# Patient Record
Sex: MALE | Employment: OTHER | URBAN - NONMETROPOLITAN AREA
[De-identification: names, ages, dates, MRNs, and addresses within clinical notes are randomized per-mention and may not be internally consistent; named-entity substitution may affect disease eponyms.]

---

## 2017-04-29 ENCOUNTER — HOSPITAL ENCOUNTER (EMERGENCY)
Facility: HOSPITAL | Age: 82
Discharge: HOME | End: 2017-04-30
Attending: EMERGENCY MEDICINE | Admitting: EMERGENCY MEDICINE
Payer: MEDICARE

## 2017-04-29 ENCOUNTER — APPOINTMENT (EMERGENCY)
Dept: RADIOLOGY | Facility: HOSPITAL | Age: 82
End: 2017-04-29
Payer: MEDICARE

## 2017-04-29 DIAGNOSIS — R26.9 ABNORMALITY OF GAIT: Primary | ICD-10-CM

## 2017-04-29 DIAGNOSIS — G62.9 NEUROPATHY: ICD-10-CM

## 2017-04-29 LAB
ANION GAP SERPL CALCULATED.3IONS-SCNC: 10 MMOL/L (ref 4–13)
BASOPHILS # BLD AUTO: 0.03 THOUSANDS/ΜL (ref 0–0.1)
BASOPHILS NFR BLD AUTO: 1 % (ref 0–1)
BUN SERPL-MCNC: 30 MG/DL (ref 5–25)
CALCIUM SERPL-MCNC: 8.7 MG/DL (ref 8.3–10.1)
CHLORIDE SERPL-SCNC: 95 MMOL/L (ref 100–108)
CO2 SERPL-SCNC: 25 MMOL/L (ref 21–32)
CREAT SERPL-MCNC: 1.62 MG/DL (ref 0.6–1.3)
EOSINOPHIL # BLD AUTO: 0.17 THOUSAND/ΜL (ref 0–0.61)
EOSINOPHIL NFR BLD AUTO: 3 % (ref 0–6)
ERYTHROCYTE [DISTWIDTH] IN BLOOD BY AUTOMATED COUNT: 15 % (ref 11.6–15.1)
GFR SERPL CREATININE-BSD FRML MDRD: 41.2 ML/MIN/1.73SQ M
GLUCOSE SERPL-MCNC: 513 MG/DL (ref 65–140)
HCT VFR BLD AUTO: 38.5 % (ref 36.5–49.3)
HGB BLD-MCNC: 12.8 G/DL (ref 12–17)
INR PPP: 1.08 (ref 0.86–1.16)
LYMPHOCYTES # BLD AUTO: 1.27 THOUSANDS/ΜL (ref 0.6–4.47)
LYMPHOCYTES NFR BLD AUTO: 21 % (ref 14–44)
MCH RBC QN AUTO: 26.4 PG (ref 26.8–34.3)
MCHC RBC AUTO-ENTMCNC: 33.2 G/DL (ref 31.4–37.4)
MCV RBC AUTO: 80 FL (ref 82–98)
MONOCYTES # BLD AUTO: 0.48 THOUSAND/ΜL (ref 0.17–1.22)
MONOCYTES NFR BLD AUTO: 8 % (ref 4–12)
NEUTROPHILS # BLD AUTO: 4.21 THOUSANDS/ΜL (ref 1.85–7.62)
NEUTS SEG NFR BLD AUTO: 67 % (ref 43–75)
PLATELET # BLD AUTO: 118 THOUSANDS/UL (ref 149–390)
PMV BLD AUTO: 9.7 FL (ref 8.9–12.7)
POTASSIUM SERPL-SCNC: 5.6 MMOL/L (ref 3.5–5.3)
PROTHROMBIN TIME: 13.9 SECONDS (ref 12.1–14.4)
RBC # BLD AUTO: 4.84 MILLION/UL (ref 3.88–5.62)
SODIUM SERPL-SCNC: 130 MMOL/L (ref 136–145)
TROPONIN I SERPL-MCNC: <0.02 NG/ML
WBC # BLD AUTO: 6.16 THOUSAND/UL (ref 4.31–10.16)

## 2017-04-29 PROCEDURE — 85610 PROTHROMBIN TIME: CPT | Performed by: EMERGENCY MEDICINE

## 2017-04-29 PROCEDURE — 85025 COMPLETE CBC W/AUTO DIFF WBC: CPT | Performed by: EMERGENCY MEDICINE

## 2017-04-29 PROCEDURE — 71020 HB CHEST X-RAY 2VW FRONTAL&LATL: CPT

## 2017-04-29 PROCEDURE — 80048 BASIC METABOLIC PNL TOTAL CA: CPT | Performed by: EMERGENCY MEDICINE

## 2017-04-29 PROCEDURE — 87086 URINE CULTURE/COLONY COUNT: CPT | Performed by: EMERGENCY MEDICINE

## 2017-04-29 PROCEDURE — 81001 URINALYSIS AUTO W/SCOPE: CPT | Performed by: EMERGENCY MEDICINE

## 2017-04-29 PROCEDURE — 36415 COLL VENOUS BLD VENIPUNCTURE: CPT | Performed by: EMERGENCY MEDICINE

## 2017-04-29 PROCEDURE — 84484 ASSAY OF TROPONIN QUANT: CPT | Performed by: EMERGENCY MEDICINE

## 2017-04-29 PROCEDURE — 93005 ELECTROCARDIOGRAM TRACING: CPT | Performed by: EMERGENCY MEDICINE

## 2017-04-30 VITALS
HEIGHT: 69 IN | BODY MASS INDEX: 29.81 KG/M2 | HEART RATE: 82 BPM | SYSTOLIC BLOOD PRESSURE: 135 MMHG | DIASTOLIC BLOOD PRESSURE: 73 MMHG | RESPIRATION RATE: 18 BRPM | OXYGEN SATURATION: 98 % | TEMPERATURE: 98.2 F | WEIGHT: 201.28 LBS

## 2017-04-30 LAB
ANION GAP BLD CALC-SCNC: 18 MMOL/L (ref 4–13)
BACTERIA UR QL AUTO: NORMAL /HPF
BILIRUB UR QL STRIP: NEGATIVE
BUN BLD-MCNC: 35 MG/DL (ref 5–25)
CA-I BLD-SCNC: 1.2 MMOL/L (ref 1.12–1.32)
CHLORIDE BLD-SCNC: 95 MMOL/L (ref 100–108)
CLARITY UR: CLEAR
COLOR UR: YELLOW
CREAT BLD-MCNC: 1.2 MG/DL (ref 0.6–1.3)
GFR SERPL CREATININE-BSD FRML MDRD: 58.3 ML/MIN/1.73SQ M
GLUCOSE SERPL-MCNC: 481 MG/DL (ref 65–140)
GLUCOSE UR STRIP-MCNC: ABNORMAL MG/DL
HCT VFR BLD CALC: 37 % (ref 36.5–49.3)
HGB BLDA-MCNC: 12.6 G/DL (ref 12–17)
HGB UR QL STRIP.AUTO: ABNORMAL
KETONES UR STRIP-MCNC: NEGATIVE MG/DL
LEUKOCYTE ESTERASE UR QL STRIP: ABNORMAL
NITRITE UR QL STRIP: NEGATIVE
NON-SQ EPI CELLS URNS QL MICRO: NORMAL /HPF
PCO2 BLD: 24 MMOL/L (ref 21–32)
PH UR STRIP.AUTO: 5 [PH] (ref 4.5–8)
POTASSIUM BLD-SCNC: 4.9 MMOL/L (ref 3.5–5.3)
PROT UR STRIP-MCNC: NEGATIVE MG/DL
RBC #/AREA URNS AUTO: NORMAL /HPF
SODIUM BLD-SCNC: 132 MMOL/L (ref 136–145)
SP GR UR STRIP.AUTO: 1.01 (ref 1–1.03)
SPECIMEN SOURCE: ABNORMAL
UROBILINOGEN UR QL STRIP.AUTO: 0.2 E.U./DL
WBC #/AREA URNS AUTO: NORMAL /HPF

## 2017-04-30 PROCEDURE — 99284 EMERGENCY DEPT VISIT MOD MDM: CPT

## 2017-04-30 PROCEDURE — 85014 HEMATOCRIT: CPT

## 2017-04-30 PROCEDURE — 80047 BASIC METABLC PNL IONIZED CA: CPT

## 2017-05-01 LAB — BACTERIA UR CULT: NORMAL

## 2017-05-02 LAB
ATRIAL RATE: 86 BPM
P AXIS: 19 DEGREES
PR INTERVAL: 176 MS
QRS AXIS: 13 DEGREES
QRSD INTERVAL: 82 MS
QT INTERVAL: 368 MS
QTC INTERVAL: 440 MS
T WAVE AXIS: 53 DEGREES
VENTRICULAR RATE: 86 BPM

## 2017-06-11 ENCOUNTER — HOSPITAL ENCOUNTER (INPATIENT)
Facility: HOSPITAL | Age: 82
LOS: 5 days | Discharge: NURSING, REHAB OR LONG-TERM CARE | DRG: 638 | End: 2017-06-16
Attending: EMERGENCY MEDICINE | Admitting: FAMILY MEDICINE
Payer: MEDICARE

## 2017-06-11 ENCOUNTER — APPOINTMENT (EMERGENCY)
Dept: RADIOLOGY | Facility: HOSPITAL | Age: 82
DRG: 638 | End: 2017-06-11
Payer: MEDICARE

## 2017-06-11 ENCOUNTER — APPOINTMENT (EMERGENCY)
Dept: CT IMAGING | Facility: HOSPITAL | Age: 82
DRG: 638 | End: 2017-06-11
Payer: MEDICARE

## 2017-06-11 DIAGNOSIS — E11.00 UNCONTROLLED TYPE 2 DM WITH HYPEROSMOLAR NONKETOTIC HYPERGLYCEMIA (HCC): Primary | ICD-10-CM

## 2017-06-11 DIAGNOSIS — F03.90 DEMENTIA (HCC): ICD-10-CM

## 2017-06-11 LAB
ACETONE SERPL-MCNC: NEGATIVE MG/DL
ALBUMIN SERPL BCP-MCNC: 3.2 G/DL (ref 3.5–5)
ALP SERPL-CCNC: 78 U/L (ref 46–116)
ALT SERPL W P-5'-P-CCNC: 27 U/L (ref 12–78)
ANION GAP SERPL CALCULATED.3IONS-SCNC: 8 MMOL/L (ref 4–13)
AST SERPL W P-5'-P-CCNC: 20 U/L (ref 5–45)
BASOPHILS # BLD AUTO: 0.02 THOUSANDS/ΜL (ref 0–0.1)
BASOPHILS NFR BLD AUTO: 0 % (ref 0–1)
BILIRUB SERPL-MCNC: 0.4 MG/DL (ref 0.2–1)
BUN SERPL-MCNC: 26 MG/DL (ref 5–25)
CALCIUM SERPL-MCNC: 8.9 MG/DL (ref 8.3–10.1)
CHLORIDE SERPL-SCNC: 89 MMOL/L (ref 100–108)
CK SERPL-CCNC: 36 U/L (ref 39–308)
CO2 SERPL-SCNC: 27 MMOL/L (ref 21–32)
CREAT SERPL-MCNC: 1.79 MG/DL (ref 0.6–1.3)
EOSINOPHIL # BLD AUTO: 0.12 THOUSAND/ΜL (ref 0–0.61)
EOSINOPHIL NFR BLD AUTO: 2 % (ref 0–6)
ERYTHROCYTE [DISTWIDTH] IN BLOOD BY AUTOMATED COUNT: 14.7 % (ref 11.6–15.1)
ETHANOL SERPL-MCNC: <3 MG/DL (ref 0–3)
GFR SERPL CREATININE-BSD FRML MDRD: 36.7 ML/MIN/1.73SQ M
GLUCOSE SERPL-MCNC: 774 MG/DL (ref 65–140)
GLUCOSE SERPL-MCNC: >500 MG/DL (ref 65–140)
GLUCOSE SERPL-MCNC: >500 MG/DL (ref 65–140)
HCT VFR BLD AUTO: 39.1 % (ref 36.5–49.3)
HGB BLD-MCNC: 12.9 G/DL (ref 12–17)
LACTATE SERPL-SCNC: 3.2 MMOL/L (ref 0.5–2)
LIPASE SERPL-CCNC: 155 U/L (ref 73–393)
LYMPHOCYTES # BLD AUTO: 1.27 THOUSANDS/ΜL (ref 0.6–4.47)
LYMPHOCYTES NFR BLD AUTO: 22 % (ref 14–44)
MAGNESIUM SERPL-MCNC: 1.9 MG/DL (ref 1.6–2.6)
MCH RBC QN AUTO: 26.3 PG (ref 26.8–34.3)
MCHC RBC AUTO-ENTMCNC: 33 G/DL (ref 31.4–37.4)
MCV RBC AUTO: 80 FL (ref 82–98)
MONOCYTES # BLD AUTO: 0.46 THOUSAND/ΜL (ref 0.17–1.22)
MONOCYTES NFR BLD AUTO: 8 % (ref 4–12)
NEUTROPHILS # BLD AUTO: 4.03 THOUSANDS/ΜL (ref 1.85–7.62)
NEUTS SEG NFR BLD AUTO: 68 % (ref 43–75)
PLATELET # BLD AUTO: 111 THOUSANDS/UL (ref 149–390)
PMV BLD AUTO: 9.7 FL (ref 8.9–12.7)
POTASSIUM SERPL-SCNC: 4.6 MMOL/L (ref 3.5–5.3)
PROT SERPL-MCNC: 7.9 G/DL (ref 6.4–8.2)
RBC # BLD AUTO: 4.91 MILLION/UL (ref 3.88–5.62)
SODIUM SERPL-SCNC: 124 MMOL/L (ref 136–145)
TROPONIN I SERPL-MCNC: <0.02 NG/ML
TSH SERPL DL<=0.05 MIU/L-ACNC: 2.85 UIU/ML (ref 0.36–3.74)
WBC # BLD AUTO: 5.9 THOUSAND/UL (ref 4.31–10.16)

## 2017-06-11 PROCEDURE — 83690 ASSAY OF LIPASE: CPT | Performed by: EMERGENCY MEDICINE

## 2017-06-11 PROCEDURE — 71010 HB CHEST X-RAY 1 VIEW FRONTAL (PORTABLE): CPT

## 2017-06-11 PROCEDURE — 80320 DRUG SCREEN QUANTALCOHOLS: CPT | Performed by: EMERGENCY MEDICINE

## 2017-06-11 PROCEDURE — 82009 KETONE BODYS QUAL: CPT | Performed by: EMERGENCY MEDICINE

## 2017-06-11 PROCEDURE — 96374 THER/PROPH/DIAG INJ IV PUSH: CPT

## 2017-06-11 PROCEDURE — 85025 COMPLETE CBC W/AUTO DIFF WBC: CPT | Performed by: EMERGENCY MEDICINE

## 2017-06-11 PROCEDURE — 36415 COLL VENOUS BLD VENIPUNCTURE: CPT | Performed by: EMERGENCY MEDICINE

## 2017-06-11 PROCEDURE — 93005 ELECTROCARDIOGRAM TRACING: CPT | Performed by: EMERGENCY MEDICINE

## 2017-06-11 PROCEDURE — 70450 CT HEAD/BRAIN W/O DYE: CPT

## 2017-06-11 PROCEDURE — 82550 ASSAY OF CK (CPK): CPT | Performed by: EMERGENCY MEDICINE

## 2017-06-11 PROCEDURE — 87147 CULTURE TYPE IMMUNOLOGIC: CPT | Performed by: EMERGENCY MEDICINE

## 2017-06-11 PROCEDURE — 84484 ASSAY OF TROPONIN QUANT: CPT | Performed by: EMERGENCY MEDICINE

## 2017-06-11 PROCEDURE — 84443 ASSAY THYROID STIM HORMONE: CPT | Performed by: EMERGENCY MEDICINE

## 2017-06-11 PROCEDURE — 82948 REAGENT STRIP/BLOOD GLUCOSE: CPT

## 2017-06-11 PROCEDURE — 80053 COMPREHEN METABOLIC PANEL: CPT | Performed by: EMERGENCY MEDICINE

## 2017-06-11 PROCEDURE — 83605 ASSAY OF LACTIC ACID: CPT | Performed by: EMERGENCY MEDICINE

## 2017-06-11 PROCEDURE — 83735 ASSAY OF MAGNESIUM: CPT | Performed by: EMERGENCY MEDICINE

## 2017-06-11 PROCEDURE — 96361 HYDRATE IV INFUSION ADD-ON: CPT

## 2017-06-11 PROCEDURE — 87040 BLOOD CULTURE FOR BACTERIA: CPT | Performed by: EMERGENCY MEDICINE

## 2017-06-11 RX ORDER — SODIUM CHLORIDE 9 MG/ML
500 INJECTION, SOLUTION INTRAVENOUS CONTINUOUS
Status: DISCONTINUED | OUTPATIENT
Start: 2017-06-12 | End: 2017-06-13

## 2017-06-11 RX ORDER — SODIUM CHLORIDE 9 MG/ML
2000 INJECTION, SOLUTION INTRAVENOUS CONTINUOUS
Status: ACTIVE | OUTPATIENT
Start: 2017-06-11 | End: 2017-06-12

## 2017-06-11 RX ORDER — DEXTROSE AND SODIUM CHLORIDE 5; .9 G/100ML; G/100ML
250 INJECTION, SOLUTION INTRAVENOUS CONTINUOUS
Status: DISCONTINUED | OUTPATIENT
Start: 2017-06-11 | End: 2017-06-12

## 2017-06-11 RX ORDER — SODIUM CHLORIDE 9 MG/ML
250 INJECTION, SOLUTION INTRAVENOUS CONTINUOUS
Status: DISCONTINUED | OUTPATIENT
Start: 2017-06-12 | End: 2017-06-13

## 2017-06-11 RX ADMIN — SODIUM CHLORIDE 2000 ML/HR: 0.9 INJECTION, SOLUTION INTRAVENOUS at 23:40

## 2017-06-11 RX ADMIN — SODIUM CHLORIDE 9 UNITS/HR: 9 INJECTION, SOLUTION INTRAVENOUS at 23:40

## 2017-06-11 RX ADMIN — SODIUM CHLORIDE 2634 ML: 0.9 INJECTION, SOLUTION INTRAVENOUS at 22:17

## 2017-06-12 ENCOUNTER — APPOINTMENT (INPATIENT)
Dept: OCCUPATIONAL THERAPY | Facility: HOSPITAL | Age: 82
DRG: 638 | End: 2017-06-12
Payer: MEDICARE

## 2017-06-12 ENCOUNTER — APPOINTMENT (INPATIENT)
Dept: PHYSICAL THERAPY | Facility: HOSPITAL | Age: 82
DRG: 638 | End: 2017-06-12
Payer: MEDICARE

## 2017-06-12 PROBLEM — E11.65 HYPERGLYCEMIA DUE TO TYPE 2 DIABETES MELLITUS (HCC): Status: ACTIVE | Noted: 2017-06-12

## 2017-06-12 PROBLEM — F03.90 DEMENTIA (HCC): Status: ACTIVE | Noted: 2017-06-12

## 2017-06-12 PROBLEM — R26.2 AMBULATORY DYSFUNCTION: Status: ACTIVE | Noted: 2017-06-12

## 2017-06-12 LAB
ANION GAP SERPL CALCULATED.3IONS-SCNC: 4 MMOL/L (ref 4–13)
ATRIAL RATE: 72 BPM
BUN SERPL-MCNC: 20 MG/DL (ref 5–25)
CALCIUM SERPL-MCNC: 7.8 MG/DL (ref 8.3–10.1)
CHLORIDE SERPL-SCNC: 108 MMOL/L (ref 100–108)
CO2 SERPL-SCNC: 26 MMOL/L (ref 21–32)
CREAT SERPL-MCNC: 1.1 MG/DL (ref 0.6–1.3)
ERYTHROCYTE [DISTWIDTH] IN BLOOD BY AUTOMATED COUNT: 14.2 % (ref 11.6–15.1)
EST. AVERAGE GLUCOSE BLD GHB EST-MCNC: 346 MG/DL
GFR SERPL CREATININE-BSD FRML MDRD: >60 ML/MIN/1.73SQ M
GLUCOSE SERPL-MCNC: 107 MG/DL (ref 65–140)
GLUCOSE SERPL-MCNC: 117 MG/DL (ref 65–140)
GLUCOSE SERPL-MCNC: 175 MG/DL (ref 65–140)
GLUCOSE SERPL-MCNC: 218 MG/DL (ref 65–140)
GLUCOSE SERPL-MCNC: 219 MG/DL (ref 65–140)
GLUCOSE SERPL-MCNC: 309 MG/DL (ref 65–140)
GLUCOSE SERPL-MCNC: 335 MG/DL (ref 65–140)
GLUCOSE SERPL-MCNC: 376 MG/DL (ref 65–140)
GLUCOSE SERPL-MCNC: 410 MG/DL (ref 65–140)
GLUCOSE SERPL-MCNC: 423 MG/DL (ref 65–140)
GLUCOSE SERPL-MCNC: 436 MG/DL (ref 65–140)
GLUCOSE SERPL-MCNC: >500 MG/DL (ref 65–140)
HBA1C MFR BLD: 13.7 % (ref 4.2–6.3)
HCT VFR BLD AUTO: 32.3 % (ref 36.5–49.3)
HGB BLD-MCNC: 10.4 G/DL (ref 12–17)
LACTATE SERPL-SCNC: 1.5 MMOL/L (ref 0.5–2)
MAGNESIUM SERPL-MCNC: 1.8 MG/DL (ref 1.6–2.6)
MCH RBC QN AUTO: 25.8 PG (ref 26.8–34.3)
MCHC RBC AUTO-ENTMCNC: 32.2 G/DL (ref 31.4–37.4)
MCV RBC AUTO: 80 FL (ref 82–98)
P AXIS: 27 DEGREES
PLATELET # BLD AUTO: 80 THOUSANDS/UL (ref 149–390)
PMV BLD AUTO: 9.3 FL (ref 8.9–12.7)
POTASSIUM SERPL-SCNC: 3.6 MMOL/L (ref 3.5–5.3)
PR INTERVAL: 202 MS
QRS AXIS: 10 DEGREES
QRSD INTERVAL: 78 MS
QT INTERVAL: 410 MS
QTC INTERVAL: 448 MS
RBC # BLD AUTO: 4.03 MILLION/UL (ref 3.88–5.62)
SODIUM SERPL-SCNC: 138 MMOL/L (ref 136–145)
T WAVE AXIS: 63 DEGREES
VENTRICULAR RATE: 72 BPM
WBC # BLD AUTO: 4.39 THOUSAND/UL (ref 4.31–10.16)

## 2017-06-12 PROCEDURE — G8987 SELF CARE CURRENT STATUS: HCPCS

## 2017-06-12 PROCEDURE — 82948 REAGENT STRIP/BLOOD GLUCOSE: CPT

## 2017-06-12 PROCEDURE — 97167 OT EVAL HIGH COMPLEX 60 MIN: CPT

## 2017-06-12 PROCEDURE — 83036 HEMOGLOBIN GLYCOSYLATED A1C: CPT | Performed by: PHYSICIAN ASSISTANT

## 2017-06-12 PROCEDURE — G8988 SELF CARE GOAL STATUS: HCPCS

## 2017-06-12 PROCEDURE — 99285 EMERGENCY DEPT VISIT HI MDM: CPT

## 2017-06-12 PROCEDURE — 97162 PT EVAL MOD COMPLEX 30 MIN: CPT | Performed by: PHYSICAL THERAPIST

## 2017-06-12 PROCEDURE — 97535 SELF CARE MNGMENT TRAINING: CPT

## 2017-06-12 PROCEDURE — 83735 ASSAY OF MAGNESIUM: CPT | Performed by: PHYSICIAN ASSISTANT

## 2017-06-12 PROCEDURE — 80048 BASIC METABOLIC PNL TOTAL CA: CPT | Performed by: PHYSICIAN ASSISTANT

## 2017-06-12 PROCEDURE — G8978 MOBILITY CURRENT STATUS: HCPCS | Performed by: PHYSICAL THERAPIST

## 2017-06-12 PROCEDURE — 83605 ASSAY OF LACTIC ACID: CPT | Performed by: PHYSICIAN ASSISTANT

## 2017-06-12 PROCEDURE — G8979 MOBILITY GOAL STATUS: HCPCS | Performed by: PHYSICAL THERAPIST

## 2017-06-12 PROCEDURE — 85027 COMPLETE CBC AUTOMATED: CPT | Performed by: PHYSICIAN ASSISTANT

## 2017-06-12 PROCEDURE — 97116 GAIT TRAINING THERAPY: CPT | Performed by: PHYSICAL THERAPIST

## 2017-06-12 RX ORDER — HEPARIN SODIUM 5000 [USP'U]/ML
5000 INJECTION, SOLUTION INTRAVENOUS; SUBCUTANEOUS EVERY 8 HOURS SCHEDULED
Status: DISCONTINUED | OUTPATIENT
Start: 2017-06-12 | End: 2017-06-16 | Stop reason: HOSPADM

## 2017-06-12 RX ORDER — ONDANSETRON 2 MG/ML
4 INJECTION INTRAMUSCULAR; INTRAVENOUS EVERY 6 HOURS PRN
Status: DISCONTINUED | OUTPATIENT
Start: 2017-06-12 | End: 2017-06-16 | Stop reason: HOSPADM

## 2017-06-12 RX ORDER — MAGNESIUM HYDROXIDE/ALUMINUM HYDROXICE/SIMETHICONE 120; 1200; 1200 MG/30ML; MG/30ML; MG/30ML
30 SUSPENSION ORAL EVERY 6 HOURS PRN
Status: DISCONTINUED | OUTPATIENT
Start: 2017-06-12 | End: 2017-06-16 | Stop reason: HOSPADM

## 2017-06-12 RX ORDER — SODIUM CHLORIDE 9 MG/ML
125 INJECTION, SOLUTION INTRAVENOUS CONTINUOUS
Status: DISCONTINUED | OUTPATIENT
Start: 2017-06-12 | End: 2017-06-14

## 2017-06-12 RX ORDER — ACETAMINOPHEN 325 MG/1
650 TABLET ORAL EVERY 6 HOURS PRN
Status: DISCONTINUED | OUTPATIENT
Start: 2017-06-12 | End: 2017-06-14

## 2017-06-12 RX ADMIN — INSULIN LISPRO 6 UNITS: 100 INJECTION, SOLUTION INTRAVENOUS; SUBCUTANEOUS at 15:38

## 2017-06-12 RX ADMIN — SODIUM CHLORIDE 10 UNITS/HR: 9 INJECTION, SOLUTION INTRAVENOUS at 00:46

## 2017-06-12 RX ADMIN — SODIUM CHLORIDE 125 ML/HR: 0.9 INJECTION, SOLUTION INTRAVENOUS at 01:00

## 2017-06-12 RX ADMIN — HEPARIN SODIUM 5000 UNITS: 5000 INJECTION, SOLUTION INTRAVENOUS; SUBCUTANEOUS at 10:50

## 2017-06-12 RX ADMIN — INSULIN LISPRO 6 UNITS: 100 INJECTION, SOLUTION INTRAVENOUS; SUBCUTANEOUS at 12:59

## 2017-06-12 RX ADMIN — SITAGLIPTIN 100 MG: 100 TABLET, FILM COATED ORAL at 13:01

## 2017-06-12 RX ADMIN — SODIUM CHLORIDE 500 ML/HR: 0.9 INJECTION, SOLUTION INTRAVENOUS at 00:49

## 2017-06-12 RX ADMIN — SODIUM CHLORIDE 125 ML/HR: 0.9 INJECTION, SOLUTION INTRAVENOUS at 08:48

## 2017-06-12 RX ADMIN — HEPARIN SODIUM 5000 UNITS: 5000 INJECTION, SOLUTION INTRAVENOUS; SUBCUTANEOUS at 18:05

## 2017-06-12 RX ADMIN — SODIUM CHLORIDE 125 ML/HR: 0.9 INJECTION, SOLUTION INTRAVENOUS at 16:52

## 2017-06-12 RX ADMIN — HEPARIN SODIUM 5000 UNITS: 5000 INJECTION, SOLUTION INTRAVENOUS; SUBCUTANEOUS at 02:06

## 2017-06-12 RX ADMIN — INSULIN LISPRO 3 UNITS: 100 INJECTION, SOLUTION INTRAVENOUS; SUBCUTANEOUS at 21:09

## 2017-06-12 RX ADMIN — INSULIN LISPRO 4 UNITS: 100 INJECTION, SOLUTION INTRAVENOUS; SUBCUTANEOUS at 15:43

## 2017-06-12 RX ADMIN — INSULIN LISPRO 10 UNITS: 100 INJECTION, SOLUTION INTRAVENOUS; SUBCUTANEOUS at 10:48

## 2017-06-13 ENCOUNTER — APPOINTMENT (INPATIENT)
Dept: PHYSICAL THERAPY | Facility: HOSPITAL | Age: 82
DRG: 638 | End: 2017-06-13
Payer: MEDICARE

## 2017-06-13 ENCOUNTER — APPOINTMENT (INPATIENT)
Dept: OCCUPATIONAL THERAPY | Facility: HOSPITAL | Age: 82
DRG: 638 | End: 2017-06-13
Payer: MEDICARE

## 2017-06-13 PROBLEM — E87.1 HYPONATREMIA: Status: ACTIVE | Noted: 2017-06-13

## 2017-06-13 LAB
ANION GAP SERPL CALCULATED.3IONS-SCNC: 9 MMOL/L (ref 4–13)
BASOPHILS # BLD AUTO: 0.01 THOUSANDS/ΜL (ref 0–0.1)
BASOPHILS NFR BLD AUTO: 0 % (ref 0–1)
BUN SERPL-MCNC: 14 MG/DL (ref 5–25)
CALCIUM SERPL-MCNC: 7.8 MG/DL (ref 8.3–10.1)
CHLORIDE SERPL-SCNC: 106 MMOL/L (ref 100–108)
CO2 SERPL-SCNC: 21 MMOL/L (ref 21–32)
CREAT SERPL-MCNC: 1.1 MG/DL (ref 0.6–1.3)
EOSINOPHIL # BLD AUTO: 0.07 THOUSAND/ΜL (ref 0–0.61)
EOSINOPHIL NFR BLD AUTO: 2 % (ref 0–6)
ERYTHROCYTE [DISTWIDTH] IN BLOOD BY AUTOMATED COUNT: 14.5 % (ref 11.6–15.1)
GFR SERPL CREATININE-BSD FRML MDRD: >60 ML/MIN/1.73SQ M
GLUCOSE SERPL-MCNC: 269 MG/DL (ref 65–140)
GLUCOSE SERPL-MCNC: 306 MG/DL (ref 65–140)
GLUCOSE SERPL-MCNC: 325 MG/DL (ref 65–140)
GLUCOSE SERPL-MCNC: 357 MG/DL (ref 65–140)
GLUCOSE SERPL-MCNC: 372 MG/DL (ref 65–140)
HCT VFR BLD AUTO: 34.5 % (ref 36.5–49.3)
HGB BLD-MCNC: 10.9 G/DL (ref 12–17)
LYMPHOCYTES # BLD AUTO: 1.04 THOUSANDS/ΜL (ref 0.6–4.47)
LYMPHOCYTES NFR BLD AUTO: 29 % (ref 14–44)
MCH RBC QN AUTO: 25.4 PG (ref 26.8–34.3)
MCHC RBC AUTO-ENTMCNC: 31.6 G/DL (ref 31.4–37.4)
MCV RBC AUTO: 80 FL (ref 82–98)
MONOCYTES # BLD AUTO: 0.29 THOUSAND/ΜL (ref 0.17–1.22)
MONOCYTES NFR BLD AUTO: 8 % (ref 4–12)
NEUTROPHILS # BLD AUTO: 2.15 THOUSANDS/ΜL (ref 1.85–7.62)
NEUTS SEG NFR BLD AUTO: 61 % (ref 43–75)
PLATELET # BLD AUTO: 84 THOUSANDS/UL (ref 149–390)
PMV BLD AUTO: 9.5 FL (ref 8.9–12.7)
POTASSIUM SERPL-SCNC: 4.3 MMOL/L (ref 3.5–5.3)
RBC # BLD AUTO: 4.29 MILLION/UL (ref 3.88–5.62)
SODIUM SERPL-SCNC: 136 MMOL/L (ref 136–145)
WBC # BLD AUTO: 3.56 THOUSAND/UL (ref 4.31–10.16)

## 2017-06-13 PROCEDURE — 82948 REAGENT STRIP/BLOOD GLUCOSE: CPT

## 2017-06-13 PROCEDURE — 80048 BASIC METABOLIC PNL TOTAL CA: CPT | Performed by: PHYSICIAN ASSISTANT

## 2017-06-13 PROCEDURE — 97110 THERAPEUTIC EXERCISES: CPT

## 2017-06-13 PROCEDURE — 97116 GAIT TRAINING THERAPY: CPT

## 2017-06-13 PROCEDURE — 85025 COMPLETE CBC W/AUTO DIFF WBC: CPT | Performed by: PHYSICIAN ASSISTANT

## 2017-06-13 RX ORDER — LISINOPRIL 5 MG/1
5 TABLET ORAL DAILY
Status: DISCONTINUED | OUTPATIENT
Start: 2017-06-13 | End: 2017-06-16 | Stop reason: HOSPADM

## 2017-06-13 RX ORDER — INSULIN GLARGINE 100 [IU]/ML
15 INJECTION, SOLUTION SUBCUTANEOUS EVERY MORNING
Status: DISCONTINUED | OUTPATIENT
Start: 2017-06-13 | End: 2017-06-14

## 2017-06-13 RX ADMIN — INSULIN LISPRO 3 UNITS: 100 INJECTION, SOLUTION INTRAVENOUS; SUBCUTANEOUS at 21:33

## 2017-06-13 RX ADMIN — INSULIN LISPRO 6 UNITS: 100 INJECTION, SOLUTION INTRAVENOUS; SUBCUTANEOUS at 12:49

## 2017-06-13 RX ADMIN — HEPARIN SODIUM 5000 UNITS: 5000 INJECTION, SOLUTION INTRAVENOUS; SUBCUTANEOUS at 17:51

## 2017-06-13 RX ADMIN — HEPARIN SODIUM 5000 UNITS: 5000 INJECTION, SOLUTION INTRAVENOUS; SUBCUTANEOUS at 01:48

## 2017-06-13 RX ADMIN — INSULIN GLARGINE 15 UNITS: 100 INJECTION, SOLUTION SUBCUTANEOUS at 09:57

## 2017-06-13 RX ADMIN — SITAGLIPTIN 100 MG: 100 TABLET, FILM COATED ORAL at 09:57

## 2017-06-13 RX ADMIN — SODIUM CHLORIDE 125 ML/HR: 0.9 INJECTION, SOLUTION INTRAVENOUS at 01:45

## 2017-06-13 RX ADMIN — INSULIN LISPRO 5 UNITS: 100 INJECTION, SOLUTION INTRAVENOUS; SUBCUTANEOUS at 17:52

## 2017-06-13 RX ADMIN — INSULIN LISPRO 4 UNITS: 100 INJECTION, SOLUTION INTRAVENOUS; SUBCUTANEOUS at 09:56

## 2017-06-13 RX ADMIN — SODIUM CHLORIDE 125 ML/HR: 0.9 INJECTION, SOLUTION INTRAVENOUS at 18:50

## 2017-06-13 RX ADMIN — LISINOPRIL 5 MG: 5 TABLET ORAL at 12:49

## 2017-06-13 RX ADMIN — HEPARIN SODIUM 5000 UNITS: 5000 INJECTION, SOLUTION INTRAVENOUS; SUBCUTANEOUS at 09:55

## 2017-06-13 RX ADMIN — SODIUM CHLORIDE 125 ML/HR: 0.9 INJECTION, SOLUTION INTRAVENOUS at 10:36

## 2017-06-14 ENCOUNTER — APPOINTMENT (INPATIENT)
Dept: OCCUPATIONAL THERAPY | Facility: HOSPITAL | Age: 82
DRG: 638 | End: 2017-06-14
Payer: MEDICARE

## 2017-06-14 ENCOUNTER — APPOINTMENT (INPATIENT)
Dept: PHYSICAL THERAPY | Facility: HOSPITAL | Age: 82
DRG: 638 | End: 2017-06-14
Payer: MEDICARE

## 2017-06-14 LAB
ANION GAP SERPL CALCULATED.3IONS-SCNC: 9 MMOL/L (ref 4–13)
BASOPHILS # BLD AUTO: 0.01 THOUSANDS/ΜL (ref 0–0.1)
BASOPHILS NFR BLD AUTO: 0 % (ref 0–1)
BUN SERPL-MCNC: 11 MG/DL (ref 5–25)
CALCIUM SERPL-MCNC: 7.8 MG/DL (ref 8.3–10.1)
CHLORIDE SERPL-SCNC: 106 MMOL/L (ref 100–108)
CO2 SERPL-SCNC: 22 MMOL/L (ref 21–32)
CREAT SERPL-MCNC: 1.06 MG/DL (ref 0.6–1.3)
EOSINOPHIL # BLD AUTO: 0.1 THOUSAND/ΜL (ref 0–0.61)
EOSINOPHIL NFR BLD AUTO: 3 % (ref 0–6)
ERYTHROCYTE [DISTWIDTH] IN BLOOD BY AUTOMATED COUNT: 14.6 % (ref 11.6–15.1)
GFR SERPL CREATININE-BSD FRML MDRD: >60 ML/MIN/1.73SQ M
GLUCOSE SERPL-MCNC: 241 MG/DL (ref 65–140)
GLUCOSE SERPL-MCNC: 322 MG/DL (ref 65–140)
GLUCOSE SERPL-MCNC: 333 MG/DL (ref 65–140)
GLUCOSE SERPL-MCNC: 344 MG/DL (ref 65–140)
GLUCOSE SERPL-MCNC: 368 MG/DL (ref 65–140)
HCT VFR BLD AUTO: 33.3 % (ref 36.5–49.3)
HGB BLD-MCNC: 10.7 G/DL (ref 12–17)
LYMPHOCYTES # BLD AUTO: 0.95 THOUSANDS/ΜL (ref 0.6–4.47)
LYMPHOCYTES NFR BLD AUTO: 26 % (ref 14–44)
MAGNESIUM SERPL-MCNC: 1.7 MG/DL (ref 1.6–2.6)
MCH RBC QN AUTO: 25.8 PG (ref 26.8–34.3)
MCHC RBC AUTO-ENTMCNC: 32.1 G/DL (ref 31.4–37.4)
MCV RBC AUTO: 80 FL (ref 82–98)
MONOCYTES # BLD AUTO: 0.31 THOUSAND/ΜL (ref 0.17–1.22)
MONOCYTES NFR BLD AUTO: 9 % (ref 4–12)
NEUTROPHILS # BLD AUTO: 2.28 THOUSANDS/ΜL (ref 1.85–7.62)
NEUTS SEG NFR BLD AUTO: 62 % (ref 43–75)
PHOSPHATE SERPL-MCNC: 2.6 MG/DL (ref 2.3–4.1)
PLATELET # BLD AUTO: 84 THOUSANDS/UL (ref 149–390)
PMV BLD AUTO: 9.5 FL (ref 8.9–12.7)
POTASSIUM SERPL-SCNC: 4.2 MMOL/L (ref 3.5–5.3)
RBC # BLD AUTO: 4.14 MILLION/UL (ref 3.88–5.62)
SODIUM SERPL-SCNC: 137 MMOL/L (ref 136–145)
WBC # BLD AUTO: 3.65 THOUSAND/UL (ref 4.31–10.16)

## 2017-06-14 PROCEDURE — 83735 ASSAY OF MAGNESIUM: CPT | Performed by: PHYSICIAN ASSISTANT

## 2017-06-14 PROCEDURE — 80048 BASIC METABOLIC PNL TOTAL CA: CPT | Performed by: PHYSICIAN ASSISTANT

## 2017-06-14 PROCEDURE — 97110 THERAPEUTIC EXERCISES: CPT

## 2017-06-14 PROCEDURE — 82948 REAGENT STRIP/BLOOD GLUCOSE: CPT

## 2017-06-14 PROCEDURE — 85025 COMPLETE CBC W/AUTO DIFF WBC: CPT | Performed by: PHYSICIAN ASSISTANT

## 2017-06-14 PROCEDURE — 97530 THERAPEUTIC ACTIVITIES: CPT

## 2017-06-14 PROCEDURE — 97535 SELF CARE MNGMENT TRAINING: CPT

## 2017-06-14 PROCEDURE — 97116 GAIT TRAINING THERAPY: CPT

## 2017-06-14 PROCEDURE — 84100 ASSAY OF PHOSPHORUS: CPT | Performed by: PHYSICIAN ASSISTANT

## 2017-06-14 RX ORDER — ACETAMINOPHEN 325 MG/1
650 TABLET ORAL EVERY 6 HOURS PRN
Status: DISCONTINUED | OUTPATIENT
Start: 2017-06-14 | End: 2017-06-16 | Stop reason: HOSPADM

## 2017-06-14 RX ORDER — MAGNESIUM SULFATE HEPTAHYDRATE 40 MG/ML
2 INJECTION, SOLUTION INTRAVENOUS ONCE
Status: COMPLETED | OUTPATIENT
Start: 2017-06-14 | End: 2017-06-16

## 2017-06-14 RX ORDER — INSULIN GLARGINE 100 [IU]/ML
25 INJECTION, SOLUTION SUBCUTANEOUS EVERY MORNING
Status: DISCONTINUED | OUTPATIENT
Start: 2017-06-15 | End: 2017-06-16

## 2017-06-14 RX ORDER — INSULIN GLARGINE 100 [IU]/ML
10 INJECTION, SOLUTION SUBCUTANEOUS ONCE
Status: COMPLETED | OUTPATIENT
Start: 2017-06-14 | End: 2017-06-14

## 2017-06-14 RX ORDER — SODIUM CHLORIDE 9 MG/ML
75 INJECTION, SOLUTION INTRAVENOUS CONTINUOUS
Status: DISCONTINUED | OUTPATIENT
Start: 2017-06-14 | End: 2017-06-16 | Stop reason: HOSPADM

## 2017-06-14 RX ADMIN — SODIUM CHLORIDE 125 ML/HR: 0.9 INJECTION, SOLUTION INTRAVENOUS at 02:33

## 2017-06-14 RX ADMIN — LISINOPRIL 5 MG: 5 TABLET ORAL at 08:54

## 2017-06-14 RX ADMIN — INSULIN LISPRO 5 UNITS: 100 INJECTION, SOLUTION INTRAVENOUS; SUBCUTANEOUS at 16:20

## 2017-06-14 RX ADMIN — MAGNESIUM SULFATE HEPTAHYDRATE 2 G: 40 INJECTION, SOLUTION INTRAVENOUS at 10:33

## 2017-06-14 RX ADMIN — SITAGLIPTIN 100 MG: 100 TABLET, FILM COATED ORAL at 08:55

## 2017-06-14 RX ADMIN — INSULIN GLARGINE 10 UNITS: 100 INJECTION, SOLUTION SUBCUTANEOUS at 12:54

## 2017-06-14 RX ADMIN — HEPARIN SODIUM 5000 UNITS: 5000 INJECTION, SOLUTION INTRAVENOUS; SUBCUTANEOUS at 02:31

## 2017-06-14 RX ADMIN — HEPARIN SODIUM 5000 UNITS: 5000 INJECTION, SOLUTION INTRAVENOUS; SUBCUTANEOUS at 09:00

## 2017-06-14 RX ADMIN — HEPARIN SODIUM 5000 UNITS: 5000 INJECTION, SOLUTION INTRAVENOUS; SUBCUTANEOUS at 18:29

## 2017-06-14 RX ADMIN — INSULIN LISPRO 6 UNITS: 100 INJECTION, SOLUTION INTRAVENOUS; SUBCUTANEOUS at 08:50

## 2017-06-14 RX ADMIN — INSULIN LISPRO 3 UNITS: 100 INJECTION, SOLUTION INTRAVENOUS; SUBCUTANEOUS at 22:18

## 2017-06-14 RX ADMIN — INSULIN LISPRO 5 UNITS: 100 INJECTION, SOLUTION INTRAVENOUS; SUBCUTANEOUS at 12:51

## 2017-06-14 RX ADMIN — SODIUM CHLORIDE 75 ML/HR: 0.9 INJECTION, SOLUTION INTRAVENOUS at 10:32

## 2017-06-14 RX ADMIN — INSULIN GLARGINE 15 UNITS: 100 INJECTION, SOLUTION SUBCUTANEOUS at 08:49

## 2017-06-15 ENCOUNTER — APPOINTMENT (INPATIENT)
Dept: OCCUPATIONAL THERAPY | Facility: HOSPITAL | Age: 82
DRG: 638 | End: 2017-06-15
Payer: MEDICARE

## 2017-06-15 ENCOUNTER — APPOINTMENT (INPATIENT)
Dept: PHYSICAL THERAPY | Facility: HOSPITAL | Age: 82
DRG: 638 | End: 2017-06-15
Payer: MEDICARE

## 2017-06-15 PROBLEM — Z91.14 NON COMPLIANCE W MEDICATION REGIMEN: Status: ACTIVE | Noted: 2017-06-15

## 2017-06-15 LAB
ALBUMIN SERPL BCP-MCNC: 2.2 G/DL (ref 3.5–5)
ALP SERPL-CCNC: 56 U/L (ref 46–116)
ALT SERPL W P-5'-P-CCNC: 28 U/L (ref 12–78)
ANION GAP SERPL CALCULATED.3IONS-SCNC: 5 MMOL/L (ref 4–13)
AST SERPL W P-5'-P-CCNC: 30 U/L (ref 5–45)
BACTERIA BLD CULT: NORMAL
BASOPHILS # BLD AUTO: 0.01 THOUSANDS/ΜL (ref 0–0.1)
BASOPHILS NFR BLD AUTO: 0 % (ref 0–1)
BILIRUB SERPL-MCNC: 0.5 MG/DL (ref 0.2–1)
BUN SERPL-MCNC: 9 MG/DL (ref 5–25)
CA-I BLD-SCNC: 1.13 MMOL/L (ref 1.12–1.32)
CALCIUM SERPL-MCNC: 7.9 MG/DL (ref 8.3–10.1)
CHLORIDE SERPL-SCNC: 105 MMOL/L (ref 100–108)
CO2 SERPL-SCNC: 24 MMOL/L (ref 21–32)
CREAT SERPL-MCNC: 0.98 MG/DL (ref 0.6–1.3)
EOSINOPHIL # BLD AUTO: 0.1 THOUSAND/ΜL (ref 0–0.61)
EOSINOPHIL NFR BLD AUTO: 3 % (ref 0–6)
ERYTHROCYTE [DISTWIDTH] IN BLOOD BY AUTOMATED COUNT: 14.9 % (ref 11.6–15.1)
GFR SERPL CREATININE-BSD FRML MDRD: >60 ML/MIN/1.73SQ M
GLUCOSE SERPL-MCNC: 201 MG/DL (ref 65–140)
GLUCOSE SERPL-MCNC: 225 MG/DL (ref 65–140)
GLUCOSE SERPL-MCNC: 285 MG/DL (ref 65–140)
GLUCOSE SERPL-MCNC: 296 MG/DL (ref 65–140)
GLUCOSE SERPL-MCNC: 304 MG/DL (ref 65–140)
GRAM STN SPEC: NORMAL
HCT VFR BLD AUTO: 33.2 % (ref 36.5–49.3)
HGB BLD-MCNC: 10.7 G/DL (ref 12–17)
LACTATE SERPL-SCNC: 1.2 MMOL/L (ref 0.5–2)
LYMPHOCYTES # BLD AUTO: 0.95 THOUSANDS/ΜL (ref 0.6–4.47)
LYMPHOCYTES NFR BLD AUTO: 25 % (ref 14–44)
MAGNESIUM SERPL-MCNC: 2 MG/DL (ref 1.6–2.6)
MCH RBC QN AUTO: 26 PG (ref 26.8–34.3)
MCHC RBC AUTO-ENTMCNC: 32.2 G/DL (ref 31.4–37.4)
MCV RBC AUTO: 81 FL (ref 82–98)
MONOCYTES # BLD AUTO: 0.37 THOUSAND/ΜL (ref 0.17–1.22)
MONOCYTES NFR BLD AUTO: 10 % (ref 4–12)
NEUTROPHILS # BLD AUTO: 2.36 THOUSANDS/ΜL (ref 1.85–7.62)
NEUTS SEG NFR BLD AUTO: 62 % (ref 43–75)
PHOSPHATE SERPL-MCNC: 2.8 MG/DL (ref 2.3–4.1)
PLATELET # BLD AUTO: 84 THOUSANDS/UL (ref 149–390)
PMV BLD AUTO: 9.2 FL (ref 8.9–12.7)
POTASSIUM SERPL-SCNC: 3.8 MMOL/L (ref 3.5–5.3)
PROT SERPL-MCNC: 6.2 G/DL (ref 6.4–8.2)
RBC # BLD AUTO: 4.12 MILLION/UL (ref 3.88–5.62)
SODIUM SERPL-SCNC: 134 MMOL/L (ref 136–145)
WBC # BLD AUTO: 3.79 THOUSAND/UL (ref 4.31–10.16)

## 2017-06-15 PROCEDURE — 82330 ASSAY OF CALCIUM: CPT | Performed by: INTERNAL MEDICINE

## 2017-06-15 PROCEDURE — 83735 ASSAY OF MAGNESIUM: CPT | Performed by: INTERNAL MEDICINE

## 2017-06-15 PROCEDURE — 84100 ASSAY OF PHOSPHORUS: CPT | Performed by: INTERNAL MEDICINE

## 2017-06-15 PROCEDURE — 83605 ASSAY OF LACTIC ACID: CPT | Performed by: INTERNAL MEDICINE

## 2017-06-15 PROCEDURE — 80053 COMPREHEN METABOLIC PANEL: CPT | Performed by: INTERNAL MEDICINE

## 2017-06-15 PROCEDURE — 97110 THERAPEUTIC EXERCISES: CPT

## 2017-06-15 PROCEDURE — 97116 GAIT TRAINING THERAPY: CPT

## 2017-06-15 PROCEDURE — 85025 COMPLETE CBC W/AUTO DIFF WBC: CPT | Performed by: INTERNAL MEDICINE

## 2017-06-15 PROCEDURE — 82948 REAGENT STRIP/BLOOD GLUCOSE: CPT

## 2017-06-15 RX ADMIN — HEPARIN SODIUM 5000 UNITS: 5000 INJECTION, SOLUTION INTRAVENOUS; SUBCUTANEOUS at 02:54

## 2017-06-15 RX ADMIN — HEPARIN SODIUM 5000 UNITS: 5000 INJECTION, SOLUTION INTRAVENOUS; SUBCUTANEOUS at 09:32

## 2017-06-15 RX ADMIN — SITAGLIPTIN 100 MG: 100 TABLET, FILM COATED ORAL at 09:34

## 2017-06-15 RX ADMIN — INSULIN LISPRO 2 UNITS: 100 INJECTION, SOLUTION INTRAVENOUS; SUBCUTANEOUS at 09:31

## 2017-06-15 RX ADMIN — INSULIN GLARGINE 25 UNITS: 100 INJECTION, SOLUTION SUBCUTANEOUS at 09:33

## 2017-06-15 RX ADMIN — INSULIN LISPRO 4 UNITS: 100 INJECTION, SOLUTION INTRAVENOUS; SUBCUTANEOUS at 11:53

## 2017-06-15 RX ADMIN — HEPARIN SODIUM 5000 UNITS: 5000 INJECTION, SOLUTION INTRAVENOUS; SUBCUTANEOUS at 19:31

## 2017-06-15 RX ADMIN — INSULIN LISPRO 2 UNITS: 100 INJECTION, SOLUTION INTRAVENOUS; SUBCUTANEOUS at 21:54

## 2017-06-15 RX ADMIN — INSULIN LISPRO 5 UNITS: 100 INJECTION, SOLUTION INTRAVENOUS; SUBCUTANEOUS at 15:40

## 2017-06-15 RX ADMIN — INSULIN LISPRO 4 UNITS: 100 INJECTION, SOLUTION INTRAVENOUS; SUBCUTANEOUS at 15:41

## 2017-06-15 RX ADMIN — INSULIN LISPRO 5 UNITS: 100 INJECTION, SOLUTION INTRAVENOUS; SUBCUTANEOUS at 11:54

## 2017-06-15 RX ADMIN — LISINOPRIL 5 MG: 5 TABLET ORAL at 09:33

## 2017-06-16 ENCOUNTER — APPOINTMENT (INPATIENT)
Dept: OCCUPATIONAL THERAPY | Facility: HOSPITAL | Age: 82
DRG: 638 | End: 2017-06-16
Payer: MEDICARE

## 2017-06-16 VITALS
TEMPERATURE: 98.7 F | OXYGEN SATURATION: 91 % | DIASTOLIC BLOOD PRESSURE: 77 MMHG | HEIGHT: 69 IN | WEIGHT: 200.4 LBS | BODY MASS INDEX: 29.68 KG/M2 | HEART RATE: 69 BPM | SYSTOLIC BLOOD PRESSURE: 121 MMHG | RESPIRATION RATE: 18 BRPM

## 2017-06-16 LAB
ANION GAP SERPL CALCULATED.3IONS-SCNC: 8 MMOL/L (ref 4–13)
BASOPHILS # BLD AUTO: 0.01 THOUSANDS/ΜL (ref 0–0.1)
BASOPHILS NFR BLD AUTO: 0 % (ref 0–1)
BUN SERPL-MCNC: 10 MG/DL (ref 5–25)
CALCIUM SERPL-MCNC: 7.7 MG/DL (ref 8.3–10.1)
CHLORIDE SERPL-SCNC: 106 MMOL/L (ref 100–108)
CO2 SERPL-SCNC: 23 MMOL/L (ref 21–32)
CREAT SERPL-MCNC: 1.11 MG/DL (ref 0.6–1.3)
EOSINOPHIL # BLD AUTO: 0.08 THOUSAND/ΜL (ref 0–0.61)
EOSINOPHIL NFR BLD AUTO: 2 % (ref 0–6)
ERYTHROCYTE [DISTWIDTH] IN BLOOD BY AUTOMATED COUNT: 14.9 % (ref 11.6–15.1)
GFR SERPL CREATININE-BSD FRML MDRD: >60 ML/MIN/1.73SQ M
GLUCOSE SERPL-MCNC: 204 MG/DL (ref 65–140)
GLUCOSE SERPL-MCNC: 226 MG/DL (ref 65–140)
GLUCOSE SERPL-MCNC: 274 MG/DL (ref 65–140)
HCT VFR BLD AUTO: 31.8 % (ref 36.5–49.3)
HGB BLD-MCNC: 10.2 G/DL (ref 12–17)
LYMPHOCYTES # BLD AUTO: 0.99 THOUSANDS/ΜL (ref 0.6–4.47)
LYMPHOCYTES NFR BLD AUTO: 28 % (ref 14–44)
MCH RBC QN AUTO: 26.1 PG (ref 26.8–34.3)
MCHC RBC AUTO-ENTMCNC: 32.1 G/DL (ref 31.4–37.4)
MCV RBC AUTO: 81 FL (ref 82–98)
MONOCYTES # BLD AUTO: 0.39 THOUSAND/ΜL (ref 0.17–1.22)
MONOCYTES NFR BLD AUTO: 11 % (ref 4–12)
NEUTROPHILS # BLD AUTO: 2.11 THOUSANDS/ΜL (ref 1.85–7.62)
NEUTS SEG NFR BLD AUTO: 59 % (ref 43–75)
PLATELET # BLD AUTO: 73 THOUSANDS/UL (ref 149–390)
PMV BLD AUTO: 9.1 FL (ref 8.9–12.7)
POTASSIUM SERPL-SCNC: 4.4 MMOL/L (ref 3.5–5.3)
RBC # BLD AUTO: 3.91 MILLION/UL (ref 3.88–5.62)
SODIUM SERPL-SCNC: 137 MMOL/L (ref 136–145)
WBC # BLD AUTO: 3.58 THOUSAND/UL (ref 4.31–10.16)

## 2017-06-16 PROCEDURE — 82948 REAGENT STRIP/BLOOD GLUCOSE: CPT

## 2017-06-16 PROCEDURE — 80048 BASIC METABOLIC PNL TOTAL CA: CPT | Performed by: PHYSICIAN ASSISTANT

## 2017-06-16 PROCEDURE — 85025 COMPLETE CBC W/AUTO DIFF WBC: CPT | Performed by: PHYSICIAN ASSISTANT

## 2017-06-16 PROCEDURE — 97530 THERAPEUTIC ACTIVITIES: CPT

## 2017-06-16 RX ORDER — INSULIN GLARGINE 100 [IU]/ML
30 INJECTION, SOLUTION SUBCUTANEOUS EVERY MORNING
Status: DISCONTINUED | OUTPATIENT
Start: 2017-06-17 | End: 2017-06-16 | Stop reason: HOSPADM

## 2017-06-16 RX ORDER — INSULIN GLARGINE 100 [IU]/ML
30 INJECTION, SOLUTION SUBCUTANEOUS EVERY MORNING
Qty: 10 ML | Refills: 0 | Status: SHIPPED | OUTPATIENT
Start: 2017-06-17 | End: 2017-11-04 | Stop reason: HOSPADM

## 2017-06-16 RX ADMIN — INSULIN LISPRO 6 UNITS: 100 INJECTION, SOLUTION INTRAVENOUS; SUBCUTANEOUS at 11:40

## 2017-06-16 RX ADMIN — SITAGLIPTIN 100 MG: 100 TABLET, FILM COATED ORAL at 09:28

## 2017-06-16 RX ADMIN — SODIUM CHLORIDE 75 ML/HR: 0.9 INJECTION, SOLUTION INTRAVENOUS at 07:45

## 2017-06-16 RX ADMIN — INSULIN LISPRO 2 UNITS: 100 INJECTION, SOLUTION INTRAVENOUS; SUBCUTANEOUS at 07:43

## 2017-06-16 RX ADMIN — HEPARIN SODIUM 5000 UNITS: 5000 INJECTION, SOLUTION INTRAVENOUS; SUBCUTANEOUS at 01:22

## 2017-06-16 RX ADMIN — LISINOPRIL 5 MG: 5 TABLET ORAL at 09:27

## 2017-06-16 RX ADMIN — INSULIN LISPRO 4 UNITS: 100 INJECTION, SOLUTION INTRAVENOUS; SUBCUTANEOUS at 11:39

## 2017-06-16 RX ADMIN — INSULIN LISPRO 5 UNITS: 100 INJECTION, SOLUTION INTRAVENOUS; SUBCUTANEOUS at 07:42

## 2017-06-16 RX ADMIN — HEPARIN SODIUM 5000 UNITS: 5000 INJECTION, SOLUTION INTRAVENOUS; SUBCUTANEOUS at 09:27

## 2017-06-16 RX ADMIN — INSULIN GLARGINE 25 UNITS: 100 INJECTION, SOLUTION SUBCUTANEOUS at 09:27

## 2017-06-17 LAB — BACTERIA BLD CULT: NORMAL

## 2017-07-24 ENCOUNTER — GENERIC CONVERSION - ENCOUNTER (OUTPATIENT)
Dept: OTHER | Facility: OTHER | Age: 82
End: 2017-07-24

## 2017-07-25 ENCOUNTER — GENERIC CONVERSION - ENCOUNTER (OUTPATIENT)
Dept: OTHER | Facility: OTHER | Age: 82
End: 2017-07-25

## 2017-07-28 ENCOUNTER — GENERIC CONVERSION - ENCOUNTER (OUTPATIENT)
Dept: OTHER | Facility: OTHER | Age: 82
End: 2017-07-28

## 2017-07-28 DIAGNOSIS — E11.29 TYPE 2 DIABETES MELLITUS WITH OTHER DIABETIC KIDNEY COMPLICATION (HCC): ICD-10-CM

## 2017-07-31 ENCOUNTER — TRANSCRIBE ORDERS (OUTPATIENT)
Dept: LAB | Facility: MEDICAL CENTER | Age: 82
End: 2017-07-31

## 2017-07-31 ENCOUNTER — APPOINTMENT (OUTPATIENT)
Dept: LAB | Facility: MEDICAL CENTER | Age: 82
End: 2017-07-31
Payer: MEDICARE

## 2017-07-31 DIAGNOSIS — E11.29 TYPE 2 DIABETES MELLITUS WITH OTHER DIABETIC KIDNEY COMPLICATION (HCC): ICD-10-CM

## 2017-07-31 LAB
ALBUMIN SERPL BCP-MCNC: 3.2 G/DL (ref 3.5–5)
ALP SERPL-CCNC: 60 U/L (ref 46–116)
ALT SERPL W P-5'-P-CCNC: 22 U/L (ref 12–78)
ANION GAP SERPL CALCULATED.3IONS-SCNC: 8 MMOL/L (ref 4–13)
AST SERPL W P-5'-P-CCNC: 28 U/L (ref 5–45)
BILIRUB SERPL-MCNC: 0.64 MG/DL (ref 0.2–1)
BUN SERPL-MCNC: 30 MG/DL (ref 5–25)
CALCIUM SERPL-MCNC: 9.3 MG/DL (ref 8.3–10.1)
CHLORIDE SERPL-SCNC: 107 MMOL/L (ref 100–108)
CHOLEST SERPL-MCNC: 130 MG/DL (ref 50–200)
CO2 SERPL-SCNC: 23 MMOL/L (ref 21–32)
CREAT SERPL-MCNC: 1.4 MG/DL (ref 0.6–1.3)
CREAT UR-MCNC: 51.5 MG/DL
EST. AVERAGE GLUCOSE BLD GHB EST-MCNC: 226 MG/DL
GFR SERPL CREATININE-BSD FRML MDRD: 47 ML/MIN/1.73SQ M
GLUCOSE P FAST SERPL-MCNC: 149 MG/DL (ref 65–99)
HBA1C MFR BLD: 9.5 % (ref 4.2–6.3)
HDLC SERPL-MCNC: 53 MG/DL (ref 40–60)
LDLC SERPL CALC-MCNC: 63 MG/DL (ref 0–100)
MICROALBUMIN UR-MCNC: <5 MG/L (ref 0–20)
MICROALBUMIN/CREAT 24H UR: <10 MG/G CREATININE (ref 0–30)
POTASSIUM SERPL-SCNC: 4.6 MMOL/L (ref 3.5–5.3)
PROT SERPL-MCNC: 7.8 G/DL (ref 6.4–8.2)
SODIUM SERPL-SCNC: 138 MMOL/L (ref 136–145)
TRIGL SERPL-MCNC: 71 MG/DL

## 2017-07-31 PROCEDURE — 80053 COMPREHEN METABOLIC PANEL: CPT

## 2017-07-31 PROCEDURE — 82043 UR ALBUMIN QUANTITATIVE: CPT

## 2017-07-31 PROCEDURE — 36415 COLL VENOUS BLD VENIPUNCTURE: CPT

## 2017-07-31 PROCEDURE — 83036 HEMOGLOBIN GLYCOSYLATED A1C: CPT

## 2017-07-31 PROCEDURE — 82570 ASSAY OF URINE CREATININE: CPT

## 2017-07-31 PROCEDURE — 80061 LIPID PANEL: CPT

## 2017-08-01 ENCOUNTER — GENERIC CONVERSION - ENCOUNTER (OUTPATIENT)
Dept: OTHER | Facility: OTHER | Age: 82
End: 2017-08-01

## 2017-09-12 ENCOUNTER — ALLSCRIPTS OFFICE VISIT (OUTPATIENT)
Dept: OTHER | Facility: OTHER | Age: 82
End: 2017-09-12

## 2017-09-12 DIAGNOSIS — E11.9 TYPE 2 DIABETES MELLITUS WITHOUT COMPLICATIONS (HCC): ICD-10-CM

## 2017-09-12 DIAGNOSIS — N18.30 CHRONIC KIDNEY DISEASE, STAGE III (MODERATE) (HCC): ICD-10-CM

## 2017-09-12 DIAGNOSIS — F03.90 DEMENTIA WITHOUT BEHAVIORAL DISTURBANCE (HCC): ICD-10-CM

## 2017-10-17 ENCOUNTER — ALLSCRIPTS OFFICE VISIT (OUTPATIENT)
Dept: OTHER | Facility: OTHER | Age: 82
End: 2017-10-17

## 2017-11-01 ENCOUNTER — HOSPITAL ENCOUNTER (INPATIENT)
Facility: HOSPITAL | Age: 82
LOS: 3 days | Discharge: NURSING, REHAB OR LONG-TERM CARE | DRG: 092 | End: 2017-11-04
Attending: EMERGENCY MEDICINE | Admitting: INTERNAL MEDICINE
Payer: MEDICARE

## 2017-11-01 ENCOUNTER — APPOINTMENT (EMERGENCY)
Dept: CT IMAGING | Facility: HOSPITAL | Age: 82
DRG: 092 | End: 2017-11-01
Payer: MEDICARE

## 2017-11-01 ENCOUNTER — APPOINTMENT (EMERGENCY)
Dept: RADIOLOGY | Facility: HOSPITAL | Age: 82
DRG: 092 | End: 2017-11-01
Payer: MEDICARE

## 2017-11-01 DIAGNOSIS — R26.2 AMBULATORY DYSFUNCTION: ICD-10-CM

## 2017-11-01 DIAGNOSIS — S09.90XA CLOSED HEAD INJURY, INITIAL ENCOUNTER: Primary | ICD-10-CM

## 2017-11-01 DIAGNOSIS — R46.89 OUTBURSTS OF EXPLOSIVE BEHAVIOR: ICD-10-CM

## 2017-11-01 DIAGNOSIS — S00.81XA ABRASION OF FACE, INITIAL ENCOUNTER: ICD-10-CM

## 2017-11-01 LAB
ACETONE SERPL-MCNC: NEGATIVE MG/DL
ALBUMIN SERPL BCP-MCNC: 3.2 G/DL (ref 3.5–5)
ALP SERPL-CCNC: 68 U/L (ref 46–116)
ALT SERPL W P-5'-P-CCNC: 24 U/L (ref 12–78)
AMPHETAMINES SERPL QL SCN: NEGATIVE
ANION GAP SERPL CALCULATED.3IONS-SCNC: 9 MMOL/L (ref 4–13)
APAP SERPL-MCNC: <2 UG/ML (ref 10–30)
APTT PPP: 28 SECONDS (ref 23–35)
AST SERPL W P-5'-P-CCNC: 20 U/L (ref 5–45)
BACTERIA UR QL AUTO: NORMAL /HPF
BARBITURATES UR QL: NEGATIVE
BASOPHILS # BLD AUTO: 0.02 THOUSANDS/ΜL (ref 0–0.1)
BASOPHILS NFR BLD AUTO: 0 % (ref 0–1)
BENZODIAZ UR QL: NEGATIVE
BILIRUB SERPL-MCNC: 0.4 MG/DL (ref 0.2–1)
BILIRUB UR QL STRIP: NEGATIVE
BUN SERPL-MCNC: 23 MG/DL (ref 5–25)
CALCIUM SERPL-MCNC: 8.5 MG/DL (ref 8.3–10.1)
CHLORIDE SERPL-SCNC: 99 MMOL/L (ref 100–108)
CK SERPL-CCNC: 111 U/L (ref 39–308)
CLARITY UR: CLEAR
CO2 SERPL-SCNC: 26 MMOL/L (ref 21–32)
COCAINE UR QL: NEGATIVE
COLOR UR: YELLOW
CREAT SERPL-MCNC: 1.37 MG/DL (ref 0.6–1.3)
EOSINOPHIL # BLD AUTO: 0.12 THOUSAND/ΜL (ref 0–0.61)
EOSINOPHIL NFR BLD AUTO: 3 % (ref 0–6)
ERYTHROCYTE [DISTWIDTH] IN BLOOD BY AUTOMATED COUNT: 15 % (ref 11.6–15.1)
ETHANOL SERPL-MCNC: <3 MG/DL (ref 0–3)
GFR SERPL CREATININE-BSD FRML MDRD: 48 ML/MIN/1.73SQ M
GLUCOSE SERPL-MCNC: 360 MG/DL (ref 65–140)
GLUCOSE SERPL-MCNC: 421 MG/DL (ref 65–140)
GLUCOSE UR STRIP-MCNC: ABNORMAL MG/DL
HCT VFR BLD AUTO: 37 % (ref 36.5–49.3)
HGB BLD-MCNC: 11.9 G/DL (ref 12–17)
HGB UR QL STRIP.AUTO: ABNORMAL
HOLD SPECIMEN: YES
INR PPP: 1.09 (ref 0.86–1.16)
KETONES UR STRIP-MCNC: NEGATIVE MG/DL
LEUKOCYTE ESTERASE UR QL STRIP: ABNORMAL
LYMPHOCYTES # BLD AUTO: 1.09 THOUSANDS/ΜL (ref 0.6–4.47)
LYMPHOCYTES NFR BLD AUTO: 24 % (ref 14–44)
MAGNESIUM SERPL-MCNC: 1.8 MG/DL (ref 1.6–2.6)
MCH RBC QN AUTO: 25.8 PG (ref 26.8–34.3)
MCHC RBC AUTO-ENTMCNC: 32.2 G/DL (ref 31.4–37.4)
MCV RBC AUTO: 80 FL (ref 82–98)
METHADONE UR QL: NEGATIVE
MONOCYTES # BLD AUTO: 0.37 THOUSAND/ΜL (ref 0.17–1.22)
MONOCYTES NFR BLD AUTO: 8 % (ref 4–12)
NEUTROPHILS # BLD AUTO: 2.96 THOUSANDS/ΜL (ref 1.85–7.62)
NEUTS SEG NFR BLD AUTO: 65 % (ref 43–75)
NITRITE UR QL STRIP: NEGATIVE
NON-SQ EPI CELLS URNS QL MICRO: NORMAL /HPF
OPIATES UR QL SCN: NEGATIVE
PCP UR QL: NEGATIVE
PH UR STRIP.AUTO: 5.5 [PH] (ref 4.5–8)
PLATELET # BLD AUTO: 101 THOUSANDS/UL (ref 149–390)
PMV BLD AUTO: 9.2 FL (ref 8.9–12.7)
POTASSIUM SERPL-SCNC: 4.4 MMOL/L (ref 3.5–5.3)
PROT SERPL-MCNC: 8 G/DL (ref 6.4–8.2)
PROT UR STRIP-MCNC: NEGATIVE MG/DL
PROTHROMBIN TIME: 14 SECONDS (ref 12.1–14.4)
RBC # BLD AUTO: 4.62 MILLION/UL (ref 3.88–5.62)
RBC #/AREA URNS AUTO: NORMAL /HPF
SALICYLATES SERPL-MCNC: <3 MG/DL (ref 3–20)
SODIUM SERPL-SCNC: 134 MMOL/L (ref 136–145)
SP GR UR STRIP.AUTO: 1.02 (ref 1–1.03)
THC UR QL: NEGATIVE
TROPONIN I SERPL-MCNC: <0.02 NG/ML
TSH SERPL DL<=0.05 MIU/L-ACNC: 2.62 UIU/ML (ref 0.36–3.74)
UROBILINOGEN UR QL STRIP.AUTO: 0.2 E.U./DL
WBC # BLD AUTO: 4.56 THOUSAND/UL (ref 4.31–10.16)
WBC #/AREA URNS AUTO: NORMAL /HPF

## 2017-11-01 PROCEDURE — 71020 HB CHEST X-RAY 2VW FRONTAL&LATL: CPT

## 2017-11-01 PROCEDURE — 85730 THROMBOPLASTIN TIME PARTIAL: CPT | Performed by: EMERGENCY MEDICINE

## 2017-11-01 PROCEDURE — 85025 COMPLETE CBC W/AUTO DIFF WBC: CPT | Performed by: EMERGENCY MEDICINE

## 2017-11-01 PROCEDURE — 82550 ASSAY OF CK (CPK): CPT | Performed by: EMERGENCY MEDICINE

## 2017-11-01 PROCEDURE — 82009 KETONE BODYS QUAL: CPT | Performed by: EMERGENCY MEDICINE

## 2017-11-01 PROCEDURE — 70450 CT HEAD/BRAIN W/O DYE: CPT

## 2017-11-01 PROCEDURE — 83735 ASSAY OF MAGNESIUM: CPT | Performed by: EMERGENCY MEDICINE

## 2017-11-01 PROCEDURE — 81001 URINALYSIS AUTO W/SCOPE: CPT | Performed by: EMERGENCY MEDICINE

## 2017-11-01 PROCEDURE — 80307 DRUG TEST PRSMV CHEM ANLYZR: CPT | Performed by: EMERGENCY MEDICINE

## 2017-11-01 PROCEDURE — 85610 PROTHROMBIN TIME: CPT | Performed by: EMERGENCY MEDICINE

## 2017-11-01 PROCEDURE — 82948 REAGENT STRIP/BLOOD GLUCOSE: CPT

## 2017-11-01 PROCEDURE — 70486 CT MAXILLOFACIAL W/O DYE: CPT

## 2017-11-01 PROCEDURE — 73110 X-RAY EXAM OF WRIST: CPT

## 2017-11-01 PROCEDURE — 84484 ASSAY OF TROPONIN QUANT: CPT | Performed by: EMERGENCY MEDICINE

## 2017-11-01 PROCEDURE — 80329 ANALGESICS NON-OPIOID 1 OR 2: CPT | Performed by: EMERGENCY MEDICINE

## 2017-11-01 PROCEDURE — 80320 DRUG SCREEN QUANTALCOHOLS: CPT | Performed by: EMERGENCY MEDICINE

## 2017-11-01 PROCEDURE — 72125 CT NECK SPINE W/O DYE: CPT

## 2017-11-01 PROCEDURE — 93005 ELECTROCARDIOGRAM TRACING: CPT | Performed by: EMERGENCY MEDICINE

## 2017-11-01 PROCEDURE — 84443 ASSAY THYROID STIM HORMONE: CPT | Performed by: EMERGENCY MEDICINE

## 2017-11-01 PROCEDURE — 80053 COMPREHEN METABOLIC PANEL: CPT | Performed by: EMERGENCY MEDICINE

## 2017-11-01 RX ORDER — ACETAMINOPHEN 325 MG/1
650 TABLET ORAL ONCE
Status: COMPLETED | OUTPATIENT
Start: 2017-11-01 | End: 2017-11-01

## 2017-11-01 RX ORDER — OLANZAPINE 2.5 MG/1
5 TABLET ORAL ONCE
Status: DISCONTINUED | OUTPATIENT
Start: 2017-11-01 | End: 2017-11-02

## 2017-11-01 RX ADMIN — ACETAMINOPHEN 650 MG: 325 TABLET, FILM COATED ORAL at 21:25

## 2017-11-02 PROBLEM — D72.819 LEUKOPENIA: Status: ACTIVE | Noted: 2017-11-02

## 2017-11-02 PROBLEM — D64.9 ANEMIA: Status: ACTIVE | Noted: 2017-11-02

## 2017-11-02 PROBLEM — R26.81 GAIT INSTABILITY: Status: ACTIVE | Noted: 2017-11-02

## 2017-11-02 PROBLEM — Z91.14 NON COMPLIANCE W MEDICATION REGIMEN: Status: RESOLVED | Noted: 2017-06-15 | Resolved: 2017-11-02

## 2017-11-02 LAB
ALBUMIN SERPL BCP-MCNC: 2.5 G/DL (ref 3.5–5)
ALP SERPL-CCNC: 58 U/L (ref 46–116)
ALT SERPL W P-5'-P-CCNC: 19 U/L (ref 12–78)
ANION GAP SERPL CALCULATED.3IONS-SCNC: 8 MMOL/L (ref 4–13)
AST SERPL W P-5'-P-CCNC: 17 U/L (ref 5–45)
BILIRUB SERPL-MCNC: 0.3 MG/DL (ref 0.2–1)
BUN SERPL-MCNC: 22 MG/DL (ref 5–25)
CALCIUM SERPL-MCNC: 8.4 MG/DL (ref 8.3–10.1)
CHLORIDE SERPL-SCNC: 102 MMOL/L (ref 100–108)
CHOLEST SERPL-MCNC: 137 MG/DL (ref 50–200)
CO2 SERPL-SCNC: 25 MMOL/L (ref 21–32)
CREAT SERPL-MCNC: 1.29 MG/DL (ref 0.6–1.3)
ERYTHROCYTE [DISTWIDTH] IN BLOOD BY AUTOMATED COUNT: 14.7 % (ref 11.6–15.1)
EST. AVERAGE GLUCOSE BLD GHB EST-MCNC: 235 MG/DL
GFR SERPL CREATININE-BSD FRML MDRD: 52 ML/MIN/1.73SQ M
GLUCOSE SERPL-MCNC: 202 MG/DL (ref 65–140)
GLUCOSE SERPL-MCNC: 233 MG/DL (ref 65–140)
GLUCOSE SERPL-MCNC: 295 MG/DL (ref 65–140)
GLUCOSE SERPL-MCNC: 324 MG/DL (ref 65–140)
GLUCOSE SERPL-MCNC: 399 MG/DL (ref 65–140)
GLUCOSE SERPL-MCNC: 419 MG/DL (ref 65–140)
HBA1C MFR BLD: 9.8 % (ref 4.2–6.3)
HCT VFR BLD AUTO: 33 % (ref 36.5–49.3)
HDLC SERPL-MCNC: 55 MG/DL (ref 40–60)
HGB BLD-MCNC: 10.6 G/DL (ref 12–17)
LDLC SERPL CALC-MCNC: 72 MG/DL (ref 0–100)
MCH RBC QN AUTO: 25.8 PG (ref 26.8–34.3)
MCHC RBC AUTO-ENTMCNC: 32.1 G/DL (ref 31.4–37.4)
MCV RBC AUTO: 80 FL (ref 82–98)
PLATELET # BLD AUTO: 79 THOUSANDS/UL (ref 149–390)
PMV BLD AUTO: 9.7 FL (ref 8.9–12.7)
POTASSIUM SERPL-SCNC: 4.5 MMOL/L (ref 3.5–5.3)
PROT SERPL-MCNC: 6.8 G/DL (ref 6.4–8.2)
RBC # BLD AUTO: 4.11 MILLION/UL (ref 3.88–5.62)
SODIUM SERPL-SCNC: 135 MMOL/L (ref 136–145)
TRIGL SERPL-MCNC: 52 MG/DL
WBC # BLD AUTO: 3.29 THOUSAND/UL (ref 4.31–10.16)

## 2017-11-02 PROCEDURE — 99285 EMERGENCY DEPT VISIT HI MDM: CPT

## 2017-11-02 PROCEDURE — 83036 HEMOGLOBIN GLYCOSYLATED A1C: CPT | Performed by: INTERNAL MEDICINE

## 2017-11-02 PROCEDURE — 97163 PT EVAL HIGH COMPLEX 45 MIN: CPT | Performed by: PHYSICAL THERAPIST

## 2017-11-02 PROCEDURE — 97116 GAIT TRAINING THERAPY: CPT | Performed by: PHYSICAL THERAPIST

## 2017-11-02 PROCEDURE — G8979 MOBILITY GOAL STATUS: HCPCS | Performed by: PHYSICAL THERAPIST

## 2017-11-02 PROCEDURE — 82948 REAGENT STRIP/BLOOD GLUCOSE: CPT

## 2017-11-02 PROCEDURE — G8987 SELF CARE CURRENT STATUS: HCPCS

## 2017-11-02 PROCEDURE — G8988 SELF CARE GOAL STATUS: HCPCS

## 2017-11-02 PROCEDURE — 80061 LIPID PANEL: CPT | Performed by: INTERNAL MEDICINE

## 2017-11-02 PROCEDURE — 97167 OT EVAL HIGH COMPLEX 60 MIN: CPT

## 2017-11-02 PROCEDURE — G8978 MOBILITY CURRENT STATUS: HCPCS | Performed by: PHYSICAL THERAPIST

## 2017-11-02 PROCEDURE — 80053 COMPREHEN METABOLIC PANEL: CPT | Performed by: INTERNAL MEDICINE

## 2017-11-02 PROCEDURE — 97535 SELF CARE MNGMENT TRAINING: CPT

## 2017-11-02 PROCEDURE — 85027 COMPLETE CBC AUTOMATED: CPT | Performed by: INTERNAL MEDICINE

## 2017-11-02 RX ORDER — LISINOPRIL 5 MG/1
5 TABLET ORAL 2 TIMES DAILY
Status: DISCONTINUED | OUTPATIENT
Start: 2017-11-02 | End: 2017-11-04 | Stop reason: HOSPADM

## 2017-11-02 RX ORDER — DONEPEZIL HYDROCHLORIDE 5 MG/1
TABLET, FILM COATED ORAL
Status: COMPLETED
Start: 2017-11-02 | End: 2017-11-02

## 2017-11-02 RX ORDER — DONEPEZIL HYDROCHLORIDE 5 MG/1
5 TABLET, FILM COATED ORAL
COMMUNITY
End: 2018-07-18 | Stop reason: SDUPTHER

## 2017-11-02 RX ORDER — INSULIN GLARGINE 100 [IU]/ML
30 INJECTION, SOLUTION SUBCUTANEOUS EVERY MORNING
Status: DISCONTINUED | OUTPATIENT
Start: 2017-11-02 | End: 2017-11-04 | Stop reason: HOSPADM

## 2017-11-02 RX ORDER — ONDANSETRON 2 MG/ML
4 INJECTION INTRAMUSCULAR; INTRAVENOUS EVERY 4 HOURS PRN
Status: DISCONTINUED | OUTPATIENT
Start: 2017-11-02 | End: 2017-11-04 | Stop reason: HOSPADM

## 2017-11-02 RX ORDER — LABETALOL HYDROCHLORIDE 5 MG/ML
10 INJECTION, SOLUTION INTRAVENOUS EVERY 6 HOURS PRN
Status: DISCONTINUED | OUTPATIENT
Start: 2017-11-02 | End: 2017-11-04 | Stop reason: HOSPADM

## 2017-11-02 RX ORDER — DONEPEZIL HYDROCHLORIDE 5 MG/1
5 TABLET, FILM COATED ORAL
Status: DISCONTINUED | OUTPATIENT
Start: 2017-11-02 | End: 2017-11-04 | Stop reason: HOSPADM

## 2017-11-02 RX ORDER — POLYETHYLENE GLYCOL 3350 17 G/17G
17 POWDER, FOR SOLUTION ORAL DAILY PRN
Status: DISCONTINUED | OUTPATIENT
Start: 2017-11-02 | End: 2017-11-04 | Stop reason: HOSPADM

## 2017-11-02 RX ORDER — SODIUM CHLORIDE 9 MG/ML
75 INJECTION, SOLUTION INTRAVENOUS CONTINUOUS
Status: DISCONTINUED | OUTPATIENT
Start: 2017-11-02 | End: 2017-11-04 | Stop reason: HOSPADM

## 2017-11-02 RX ORDER — GABAPENTIN 100 MG/1
100 CAPSULE ORAL
COMMUNITY
End: 2018-07-18 | Stop reason: SDUPTHER

## 2017-11-02 RX ORDER — HEPARIN SODIUM 5000 [USP'U]/ML
5000 INJECTION, SOLUTION INTRAVENOUS; SUBCUTANEOUS EVERY 8 HOURS SCHEDULED
Status: DISCONTINUED | OUTPATIENT
Start: 2017-11-02 | End: 2017-11-02

## 2017-11-02 RX ORDER — PRAVASTATIN SODIUM 20 MG
10 TABLET ORAL
Status: DISCONTINUED | OUTPATIENT
Start: 2017-11-02 | End: 2017-11-04 | Stop reason: HOSPADM

## 2017-11-02 RX ORDER — GABAPENTIN 100 MG/1
100 CAPSULE ORAL
Status: DISCONTINUED | OUTPATIENT
Start: 2017-11-02 | End: 2017-11-04 | Stop reason: HOSPADM

## 2017-11-02 RX ORDER — HEPARIN SODIUM 5000 [USP'U]/ML
INJECTION, SOLUTION INTRAVENOUS; SUBCUTANEOUS
Status: COMPLETED
Start: 2017-11-02 | End: 2017-11-02

## 2017-11-02 RX ORDER — ACETAMINOPHEN 325 MG/1
650 TABLET ORAL EVERY 6 HOURS PRN
Status: DISCONTINUED | OUTPATIENT
Start: 2017-11-02 | End: 2017-11-04 | Stop reason: HOSPADM

## 2017-11-02 RX ORDER — GABAPENTIN 100 MG/1
CAPSULE ORAL
Status: COMPLETED
Start: 2017-11-02 | End: 2017-11-02

## 2017-11-02 RX ADMIN — LISINOPRIL 5 MG: 5 TABLET ORAL at 08:20

## 2017-11-02 RX ADMIN — HEPARIN SODIUM 5000 UNITS: 5000 INJECTION, SOLUTION INTRAVENOUS; SUBCUTANEOUS at 07:21

## 2017-11-02 RX ADMIN — INSULIN LISPRO 6 UNITS: 100 INJECTION, SOLUTION INTRAVENOUS; SUBCUTANEOUS at 16:11

## 2017-11-02 RX ADMIN — GABAPENTIN 100 MG: 100 CAPSULE ORAL at 01:41

## 2017-11-02 RX ADMIN — INSULIN LISPRO 1 UNITS: 100 INJECTION, SOLUTION INTRAVENOUS; SUBCUTANEOUS at 21:03

## 2017-11-02 RX ADMIN — DONEPEZIL HYDROCHLORIDE 5 MG: 5 TABLET, FILM COATED ORAL at 01:41

## 2017-11-02 RX ADMIN — INSULIN LISPRO 6 UNITS: 100 INJECTION, SOLUTION INTRAVENOUS; SUBCUTANEOUS at 11:41

## 2017-11-02 RX ADMIN — GABAPENTIN 100 MG: 100 CAPSULE ORAL at 21:01

## 2017-11-02 RX ADMIN — PRAVASTATIN SODIUM 10 MG: 20 TABLET ORAL at 16:10

## 2017-11-02 RX ADMIN — SODIUM CHLORIDE 75 ML/HR: 0.9 INJECTION, SOLUTION INTRAVENOUS at 14:01

## 2017-11-02 RX ADMIN — HEPARIN SODIUM 5000 UNITS: 5000 INJECTION, SOLUTION INTRAVENOUS; SUBCUTANEOUS at 01:41

## 2017-11-02 RX ADMIN — INSULIN GLARGINE 30 UNITS: 100 INJECTION, SOLUTION SUBCUTANEOUS at 08:21

## 2017-11-02 RX ADMIN — DONEPEZIL HYDROCHLORIDE 5 MG: 5 TABLET, FILM COATED ORAL at 21:01

## 2017-11-02 RX ADMIN — INSULIN LISPRO 5 UNITS: 100 INJECTION, SOLUTION INTRAVENOUS; SUBCUTANEOUS at 07:23

## 2017-11-02 RX ADMIN — INSULIN LISPRO 4 UNITS: 100 INJECTION, SOLUTION INTRAVENOUS; SUBCUTANEOUS at 11:41

## 2017-11-02 RX ADMIN — INSULIN LISPRO 3 UNITS: 100 INJECTION, SOLUTION INTRAVENOUS; SUBCUTANEOUS at 16:11

## 2017-11-02 RX ADMIN — INSULIN LISPRO 4 UNITS: 100 INJECTION, SOLUTION INTRAVENOUS; SUBCUTANEOUS at 01:41

## 2017-11-02 RX ADMIN — SODIUM CHLORIDE 75 ML/HR: 0.9 INJECTION, SOLUTION INTRAVENOUS at 01:20

## 2017-11-02 RX ADMIN — INSULIN LISPRO 6 UNITS: 100 INJECTION, SOLUTION INTRAVENOUS; SUBCUTANEOUS at 07:24

## 2017-11-02 RX ADMIN — LISINOPRIL 5 MG: 5 TABLET ORAL at 17:08

## 2017-11-02 NOTE — PROGRESS NOTES
[facility] Internal Medicine  Progress Note - Michael Burroughs 81 y o  male MRN: [id number]    Unit/Bed#: 411-01 Encounter: [id number]        Thrombocytopenia [facility])   Assessment & Plan    Platelets  trending down from 101 on admission yesaterday to 79 today  Probably related to chronic conditions baseline result from dating back to 2015 between 100-125 vs heparin prophylaxis  Will discontinue heaparin  Will monitor closely  Order AM labs         Hypertension   Assessment & Plan    Blood pressure is controlled   Continue Labatalol 10mg IV , Lisinopril tablet 5mg PO        CKD (chronic kidney disease) stage 3, GFR 30-59 ml/min   Assessment & Plan    CKD is stable  Creatinine is at 1 37 on admission trending down, at 1 29 today baseline over last 5 months is between   9 and 1 1  Will continue IV NS at 75cc/hr  Will monitor and order AM labs              Hyperglycemia due to type 2 diabetes mellitus [facility])   Assessment & Plan    Hyperglycemic with blood sugar of 421 last A1C in July 2017 was 9 5  Possible uncontrolled diabetes, A1C results pending  Blood sugar trending down 324 this AM  Will continue IV NS at 75cc/hr  Continue current inpatient insulin regimen  Order AM Labs        * Ambulatory dysfunction   Assessment & Plan    No acute extremity findings on XR S/P fall yesterday  Concerns for ability to ambulate without support  Awaiting OP/PT evaluation  Case management for possible placement              VTE Pharmacologic Prophylaxis:   Pharmacologic: Heparin  Mechanical VTE Prophylaxis in Place: No    Patient Centered Rounds: I have performed bedside rounds with nursing staff today  Discussions with Specialists or Other Care Team Provider: Primary RN    Education and Discussions with Family / Patient: Discussion with patient regarding his ability to ambulate safely  Time Spent for Care: 20 minutes  More than 50% of total time spent on counseling and coordination of care as described above      Current Length of Stay: 1 day(s)    Current Patient Status: Inpatient   Certification Statement: The patient will continue to require additional inpatient hospital stay due to Hyperglycemia, OT/PT evaluation    Discharge Plan: Possible discharge within 24 to 48 hours pending hyperglycemia resolve and PT/OT evaluation and case management evaluation for possible placement    Code Status: Level 1 - Full Code      Subjective:   Patient is alert and oriented, has concerns of pain in lower extremities when he stands to walk  Mentioned a previous discussion 6 months with his PCP regarding the possible need for motorized chair to get around  Thinks that he may need it  Objective:     Vitals:   Temp (24hrs), Av 9 °F (36 6 °C), Min:97 5 °F (36 4 °C), Max:98 4 °F (36 9 °C)    HR:  [60-83] 68  Resp:  [12-28] 18  BP: (128-193)/(60-80) 134/67  SpO2:  [93 %-99 %] 93 %  Body mass index is 29 72 kg/m²  Input and Output Summary (last 24 hours): Intake/Output Summary (Last 24 hours) at 17 0933  Last data filed at 17 1650   Gross per 24 hour   Intake              480 ml   Output              425 ml   Net               55 ml       Physical Exam:     Physical Exam   Constitutional: He is oriented to person, place, and time  He appears well-developed and well-nourished  No distress  HENT:   Head: Normocephalic  Mouth/Throat: No oropharyngeal exudate  Abrasion to middle of nose, mild swelling and tenderness  Hearing aid in Right ear  Eyes: EOM are normal  Pupils are equal, round, and reactive to light  Neck: Normal range of motion  Neck supple  No JVD present  Cardiovascular: Normal rate, regular rhythm and intact distal pulses  Exam reveals no friction rub  No murmur heard  Pulmonary/Chest: Effort normal and breath sounds normal  No respiratory distress  He has no wheezes  He has no rales  Abdominal: Soft  He exhibits no distension  There is no tenderness  Musculoskeletal: He exhibits no edema  Early trace edema to bilateral lower extremities   Lymphadenopathy:     He has no cervical adenopathy  Neurological: He is oriented to person, place, and time  Skin: Skin is warm and dry  Psychiatric: He has a normal mood and affect  Additional Data:     Labs:      Results from last 7 days  Lab Units 11/02/17 0451 11/01/17 2045   WBC Thousand/uL 3 29* 4 56   HEMOGLOBIN g/dL 10 6* 11 9*   HEMATOCRIT % 33 0* 37 0   PLATELETS Thousands/uL 79* 101*   NEUTROS PCT %  --  65   LYMPHS PCT %  --  24   MONOS PCT %  --  8   EOS PCT %  --  3       Results from last 7 days  Lab Units 11/02/17 0451   SODIUM mmol/L 135*   POTASSIUM mmol/L 4 5   CHLORIDE mmol/L 102   CO2 mmol/L 25   BUN mg/dL 22   CREATININE mg/dL 1 29   CALCIUM mg/dL 8 4   TOTAL PROTEIN g/dL 6 8   BILIRUBIN TOTAL mg/dL 0 30   ALK PHOS U/L 58   ALT U/L 19   AST U/L 17   GLUCOSE RANDOM mg/dL 399*       Results from last 7 days  Lab Units 11/01/17 2045   INR  1 09       * I Have Reviewed All Lab Data Listed Above  * Additional Pertinent Lab Tests Reviewed: [facility]    Imaging:    Imaging Reports Reviewed Today Include: Chest Xr, XR right wrist, CT head without contrast, CT facial bones and CT cervical spine- No acute findings  Imaging Personally Reviewed by Myself Includes:  None    Recent Cultures (last 7 days):           Last 24 Hours Medication List:     donepezil 5 mg Oral HS   gabapentin 100 mg Oral HS   insulin glargine 30 Units Subcutaneous QAM   insulin lispro 1-5 Units Subcutaneous HS   insulin lispro 1-6 Units Subcutaneous TID AC   insulin lispro 6 Units Subcutaneous TID With Meals   lisinopril 5 mg Oral BID   pravastatin 10 mg Oral Daily With Dinner        Today, Patient Was Seen By: Tiffany Goodman PA-C    ** Please Note: Dictation voice to text software may have been used in the creation of this document   **

## 2017-11-02 NOTE — ASSESSMENT & PLAN NOTE
Hyperglycemic with blood sugar of 421 last A1C in July 2017 was 9 5  Possible uncontrolled diabetes, A1C results pending     Blood sugar trending down 324 this AM  Will continue IV NS at 75cc/hr  Continue current inpatient insulin regimen  Order AM Labs

## 2017-11-02 NOTE — ASSESSMENT & PLAN NOTE
No acute extremity findings on XR S/P fall yesterday  Concerns for ability to ambulate without support  Awaiting OP/PT evaluation  Case management for possible placement

## 2017-11-02 NOTE — PROGRESS NOTES
Patient's orders unable to be released after arrival due to system downtime  Orders written for medications to be given at 11/2/2017 at 00:00; verified by David Sullivan RN  [facility] and given at 01:41  Dr Gentry Mueller called and made aware that medications were administered

## 2017-11-02 NOTE — ASSESSMENT & PLAN NOTE
CKD is stable  Creatinine is at 1 37 on admission trending down, at 1 29 today baseline over last 5 months is between   9 and 1 1  Will continue IV NS at 75cc/hr  Will monitor and order AM labs

## 2017-11-02 NOTE — PHYSICAL THERAPY NOTE
PT Evaluation     11/02/17 1110   Note Type   Note type Eval/Treat   Pain Assessment   Pain Score 5   Pain Location Wrist   Pain Orientation Right   Home Living   Type of Home House   Home Layout Multi-level; Able to live on main level with bedroom/bathroom;1/2 bath on main level;Bed/bath upstairs;Stairs to enter without rails  (1 ERICA no HR, full flight to 2nd floor)   Bathroom Shower/Tub Tub/shower unit   Bathroom Toilet Standard   Bathroom Equipment Grab bars in shower; Shower chair   [address]   Prior Function   Level of [place] Independent with ADLs and functional mobility  ((I) ambulation no A  D )   Lives With Son   Receives Help From Family   ADL Assistance Independent   IADLs Needs assistance   Comments pt doesn't drive   Restrictions/Precautions   Other Precautions Chair Alarm;Multiple lines;Telemetry;Pain   General   Family/Caregiver Present No   Cognition   Arousal/Participation Alert   Orientation Level Oriented to person;Oriented to place   Following Commands Follows one step commands without difficulty   RLE Assessment   RLE Assessment WFL  (4+/5)   LLE Assessment   LLE Assessment WFL  (4+/5)   Coordination   Sensation WFL   Light Touch   RLE Light Touch Grossly intact   LLE Light Touch Grossly intact   Bed Mobility   Additional Comments Pt seated in chair at bedside when PT entered  pt returned to sitting in chair with chair alarm on and bell in reach  Transfers   Sit to Stand 5  Supervision   Additional items Verbal cues   Stand to Sit 5  Supervision   Additional items Verbal cues   Stand pivot (close CGA)   Additional items Verbal cues   Additional Comments Pt with one episode of LOB requiring min(A) to correct LOB  Pt with fair ambulation  Pt would benefit from use of A D  for ambulation for balance and stability  Ambulation/Elevation   Gait pattern Narrow SOY; Short stride  (decreased step length)   Gait Assistance (close CGA)   Assistive Device None   Distance 300ft no A D  close CGA with one episode of LOB requiring min(A) to self correct  Pt with poor safety awareness and over estimates ability  Balance   Static Sitting Good   Dynamic Sitting Good   Static Standing Fair   Dynamic Standing Fair   Ambulatory Fair   Endurance Deficit   Endurance Deficit Yes   Endurance Deficit Description limited ambulation tolerance due to hip soreness with prolonged ambulation   Activity Tolerance   Activity Tolerance Patient limited by fatigue;Patient limited by pain   Assessment   Prognosis Good   Problem List Decreased strength;Decreased endurance; Impaired balance;Decreased mobility;Pain;Decreased safety awareness; Impaired judgement   Assessment Pt is an 81year old male presenting with decreased safety awareness endurance and functional mobility requiring close CGA for safety and fall prevention  Pt with LOB during ambulation requiring min(A)  Pt is in need of continued activity in PT to improve strength balance endurance safety awareness and functional mobility  Pt may benefit from short term rehab when medically stable  Goals   Patient Goals To feel better and return home   STG Expiration Date 11/09/17   Short Term Goal #1 (S) for all bed mobility and transfers with [facility]   Short Term Goal #2 Pt will ambulate 450ft with SPC (S) no LOB and will ascend/descend 11 steps with HR (S)   Plan   Treatment/Interventions Functional transfer training;LE strengthening/ROM; Therapeutic exercise; Endurance training;Patient/family training;Bed mobility;Gait training;Elevations   PT Frequency (3-5x/wk)   Recommendation   Recommendation Short-term skilled PT; Home PT  (based on progress)   PT - OK to Discharge Yes  (when medically stable for discharge)   no SCD's  Pt seated in chair with bell in reach alarm on and LE's elevated

## 2017-11-02 NOTE — ED PROCEDURE NOTE
PROCEDURE  ECG 12 Lead Documentation  Date/Time: 11/1/2017 9:24 PM  Performed by: Army Nair by: Stanley Second     Indications / Diagnosis:  Venida Martin  ECG reviewed by me, the ED Provider: yes    Patient location:  ED  Interpretation:     Interpretation: normal    Rate:     ECG rate:  78    ECG rate assessment: normal    Rhythm:     Rhythm: sinus rhythm    Ectopy:     Ectopy: none    QRS:     QRS axis:  Normal  Conduction:     Conduction: normal    ST segments:     ST segments:  Normal  T waves:     T waves: normal

## 2017-11-02 NOTE — SOCIAL WORK
Cm met with the patient and spoke with pt's son:Michael Kulkarni Analia [phone] to evaluate the patients prior function and living situation and any barriers to d/c and form a safe d/c plan  Cm also evaluated the patient for any services in the home or needs for services  Pt was at [facility] from 6/16-7/22 and when he was dc'd went to his sons home, that is currently where pt resides  Has 1 ERICA then has been staying on the 1st floor  There is a full bath on the 2nd floor but pt really wasn't going upstairs, has a 1/2 bath on the 1st floor  No services at present, and re:DME has a RW and BGM  Pt's son usually does the driving, but states his dad occasionally prefers walking the few blocks to Dr Yap Angry office  PCP is Dr Momo Corbett and pharmacy is [facility] in [place]  Son agrees that SNF is needed on dc as it has been getting progressively worse trying to manage his dad at home  His dad won't checks his sugars, won't bathe, doesn't allow pt or pt's girlfriend (who also lives there) to assist him  Son requesting referrals be made to [facility] in [place] and [facility] [place]  CM will reinfoce with pt the need for SNF on dc  Referrals made, CM will f/u

## 2017-11-02 NOTE — PLAN OF CARE
Problem: DISCHARGE PLANNING - CARE MANAGEMENT  Goal: Discharge to post-acute care or home with appropriate resources  INTERVENTIONS:  - Conduct assessment to determine patient/family and health care team treatment goals, and need for post-acute services based on payer coverage, community resources, and patient preferences, and barriers to discharge  - Address psychosocial, clinical, and financial barriers to discharge as identified in assessment in conjunction with the patient/family and health care team  - Arrange appropriate level of post-acute services according to patient's   needs and preference and payer coverage in collaboration with the physician and health care team  - Communicate with and update the patient/family, physician, and health care team regarding progress on the discharge plan  - Arrange appropriate transportation to post-acute venues  Outcome: Progressing  -SNF on dc  -referrals [facility] and [address]

## 2017-11-02 NOTE — OCCUPATIONAL THERAPY NOTE
Occupational Therapy Evaluation     Patient Name: Kesha Thomas  IKZBI'H Date: 11/2/2017  Problem List  Patient Active Problem List   Diagnosis    Right ureteral stone    Hydronephrosis with urinary obstruction due to ureteral calculus    Diabetes mellitus type 2 in obese ([address] )    Essential hypertension    Lactic acidosis    Portal vein thrombosis    CKD (chronic kidney disease)    Hyperglycemia due to type 2 diabetes mellitus (HCC)    Ambulatory dysfunction    Dementia    Hyponatremia    Non compliance w medication regimen    CKD (chronic kidney disease) stage 3, GFR 30-59 ml/min    Age-related cognitive decline    Hypertension    Thrombocytopenia (HCC)     Past Medical History  Past Medical History:   Diagnosis Date    Age-related cognitive decline     CKD (chronic kidney disease) stage 3, GFR 30-59 ml/min 08/16/2016    Dementia     Diabetes mellitus ([address] )     Hearing loss     History of kidney stones     Hypertension     Thrombocytopenia (HCC)      Past Surgical History  Past Surgical History:   Procedure Laterality Date    APPENDECTOMY      CYSTOSCOPY Right 8/14/2016    Procedure: CYSTOSCOPY, right uretereoscopy,, stone extraction, stent ;  Surgeon: Caity Covington MD;  Location: [facility];  Service:     NEPHRECTOMY      THYROIDECTOMY             11/02/17 1041   Note Type   Note type Eval/Treat   Restrictions/Precautions   Weight Bearing Precautions Per Order No   Other Precautions Chair Alarm;Multiple lines;Telemetry; Fall Risk;Pain   Pain Assessment   Pain Assessment 0-10   Pain Score 5   Pain Location Wrist   Pain Orientation Right   Home Living   Type of Home House   Home Layout Performs ADLs on one level; Able to live on main level with bedroom/bathroom; Two level;1/2 bath on main level;Stairs to enter without rails  (1 ERICA; full bath on 2nd; 10-12 to 2nd no HR)   Bathroom Shower/Tub Tub/shower unit   Bathroom Toilet Standard   Bathroom Equipment Shower chair   Bathroom Accessibility Accessible   Additional Comments pt is poor historian and tangential during interveiew and PLOF questions   Prior Function   Level of [place] Independent with ADLs and functional mobility; Needs assistance with IADLs   Lives With Son   Receives Help From Family   ADL Assistance Independent   IADLs Needs assistance   Comments pt states he is (I) with ADLs and son and gf completes IADLs and driving    Psychosocial   Psychosocial (WDL) WDL   Subjective   Subjective "I am not going to the nursing home, i am not!"   ADL   Where Assessed (bathroom)   Grooming Assistance 5  Supervision/Setup   Grooming Deficit Verbal cueing   LB Dressing Assistance 5  Supervision/Setup   LB Dressing Deficit Verbal cueing   Toileting Assistance  5  Supervision/Setup   Toileting Deficit Steadying;Verbal cueing   Additional Comments pt stood to urinate in toilet; pt required (S) and physical correction after a slight loss of balance    Bed Mobility   Additional Comments pt seated in chair at start and end of session   Transfers   Sit to Stand 5  Supervision   Additional items Verbal cues   Stand to Sit 5  Supervision   Additional items Verbal cues   Functional Mobility   Functional Mobility 5  Supervision   Additional Comments no device; pt required min (A) for a significant LOB during session   Balance   Static Sitting Good   Dynamic Sitting Good   Static Standing Fair +   Dynamic Standing Fair   Ambulatory Fair   Activity Tolerance   Activity Tolerance Patient limited by fatigue   RUE Assessment   RUE Assessment X  (splint; decreased  strength; 3+/5 grossly)   LUE Assessment   LUE Assessment WFL   Hand Function   Gross Motor Coordination Functional   Fine Motor Coordination Functional   Sensation   Light Touch No apparent deficits   Sharp/Dull No apparent deficits   Cognition   Overall Cognitive Status Impaired   Arousal/Participation Alert; Responsive   Attention Attends with cues to redirect   Orientation Level 10   Stairs Score 0   Barthel Index Score 60     Pt will benefit from continued OT services in order to maximize (I) c ADL performance, FM c no device, and improve overall endurance/strength required to complete functional tasks in preparation for d/c  Pt left seated in chair at end of session; all needs within reach; all lines intact; scds connected and turned on

## 2017-11-02 NOTE — ASSESSMENT & PLAN NOTE
Alert and oriented today  Observe for new changes in mental status  Continue current medications donepezil 5mg PO daily

## 2017-11-02 NOTE — H&P
History and Physical - [facility] Internal Medicine  Ion Moman 81 y o  male   MRN: [id number]  Unit/Bed#: RM06 Encounter: [id number]  Admitting Physician: Sania Heard DO  PCP: Maulik Villanueva [phone]  Date of Admission:  11/01/17    Principal Problem:    Ambulatory dysfunction  Active Problems:    Hyperglycemia due to type 2 diabetes mellitus (HCC)    CKD (chronic kidney disease) stage 3, GFR 30-59 ml/min    Age-related cognitive decline    Hypertension    Thrombocytopenia ([facility] [address] )    Plan  1  Ambulatory dysfunction  Given fall, have Physical therapy evaluate  May need placement  Previously was living independently but after hospitalization this summer he went to [place] for rehab and has been residing with his son  2  Diabetes mellitus type 2 with hyperglycemia  Patient states that he did not get a chance to take his glargine this morning due to fall  Continue lispro and sliding scale restart glargine in the morning  No evidence of acidosis  3  Diabetic neuropathy  Continue gabapentin  4  Chronic kidney disease stage 3  At baseline  5  Essential hypertension  Elevated at time of arrival due to distress  Has subsided  Continue lisinopril  6  Cognitive decline/dementia  Reviewed outpatient records  Currently on donepezil 5 mg q h s   7  Thrombocytopenia  Appears chronic  VTE Prophylaxis: Heparin  Code Status:  Level one full code  Anticipated Length of Stay:  Patient will be admitted on an Inpatient basis with an anticipated length of stay of  greater than 2 midnights  Justification for Hospital Stay:  Ambulatory dysfunction with unsafe disposition  Total Time for Visit, including Counseling / Coordination of Care: 35 mins  Greater than 50% of this total time spent on direct patient counseling and coordination of care      Chief Complaint:     Chief Complaint   Patient presents with    Fall     Patient fell earlier this morning and hit his face and hurt his right wrist       History of Present Illness:    Moe Gallardo is a 81 y o  male who presents with fall  Patient was brought in by the son  Apparently when he woke up this morning he walked to [employer] to  his diabetic test strips  He is supposed to be using a rolling walker however does not feel the need to therefore has placed in the basement of his son's house whom he lives with  Walking back home after picking up his prescriptions, he noticed that instead of getting test strips he got needles  Upon walking back to [facility] aid he had intense pain in right leg causing it to give out which he fell on the pavement  He attempted to soft in the fall with his hands and wrist however ended up hitting his face  He proceeded to continue to [facility] aid and eventually walked home  When his son saw him this evening after returning from work, he was brought to the hospital for trauma evaluation  Upon my examination, the son has left  There is concerns at the patient is unsafe to be home with family as they are working during the day  In addition he was found to have deep profoundly hyperglycemic therefore admission was requested  Patient at the moment denies any chest pain or shortness of breath  He is complaining of hunger as he has not had anything to eat during the day      Review of Systems:  History obtained from chart review and the patient  General ROS: negative for - chills or fever  Psychological ROS: negative for - anxiety  Ophthalmic ROS: negative for - eye pain or itchy eyes  Respiratory ROS: negative for - shortness of breath  Cardiovascular ROS: negative for - chest pain  Gastrointestinal ROS: negative for - gas/bloating or heartburn  Genito-Urinary ROS: negative for - dysuria  Musculoskeletal ROS: positive for - muscle pain and pain in right leg and back  Neurological ROS: positive for - gait disturbance and memory loss  Otherwise, all other 12 point review of systems normal     Past Medical and Surgical History:   Past Medical History:   Diagnosis Date    Age-related cognitive decline     CKD (chronic kidney disease) stage 3, GFR 30-59 ml/min 08/16/2016    Dementia     Diabetes mellitus ([address] )     Hearing loss     History of kidney stones     Hypertension      Past Surgical History:   Procedure Laterality Date    APPENDECTOMY      CYSTOSCOPY Right 8/14/2016    Procedure: CYSTOSCOPY, right uretereoscopy,, stone extraction, stent ;  Surgeon: Socrates Aguilar MD;  [facility]: [facility];  Service:     NEPHRECTOMY      THYROIDECTOMY       Meds/Allergies: Allergies: No Known Allergies  Prior to Admission Medications   Prescriptions Last Dose Informant Patient Reported? Taking?   donepezil (ARICEPT) 5 mg tablet   Yes Yes   Sig: Take 5 mg by mouth daily at bedtime   gabapentin (NEURONTIN) 100 mg capsule   Yes Yes   Sig: Take 100 mg by mouth daily at bedtime   insulin glargine (LANTUS) 100 units/mL subcutaneous injection   No Yes   Sig: Inject 30 Units under the skin every morning   insulin lispro (HumaLOG) 100 units/mL injection   No Yes   Sig: Inject 1-5 Units under the skin daily at bedtime   insulin lispro (HumaLOG) 100 units/mL injection   No Yes   Sig: Inject 1-6 Units under the skin 3 (three) times a day before meals   insulin lispro (HumaLOG) 100 units/mL injection   No Yes   Sig: Inject 6 Units under the skin 3 (three) times a day with meals   lisinopril (ZESTRIL) 5 mg tablet   Yes No   Sig: Take 5 mg by mouth 2 (two) times a day  pravastatin (PRAVACHOL) 10 mg tablet   Yes No   Sig: Take 10 mg by mouth daily at bedtime  Facility-Administered Medications: None     Social History:     Social History     Social History    Marital status: Single     Spouse name: N/A    Number of children: N/A    Years of education: N/A     Occupational History    Not on file       Social History Main Topics    Smoking status: Former Smoker    Smokeless tobacco: Never Used    Alcohol use No      Comment: patient denies drinking alcohol    Drug use: No    Sexual activity: Not on file     Other Topics Concern    Not on file     Social History Narrative    No narrative on file     Patient Pre-hospital Living Situation:  Lives with son  Patient Pre-hospital Level of Mobility:  Has not been using rolling walker  Patient Pre-hospital Diet Restrictions:     Family History:  History reviewed  No pertinent family history  Physical Exam:   Vitals:   Blood Pressure: 140/71 (11/01/17 2250)  Pulse: 63 (11/01/17 2250)  Temperature: 98 4 °F (36 9 °C) (11/01/17 2035)  Temp Source: Temporal (11/01/17 2035)  Respirations: 17 (11/01/17 2250)  Height: 5' 9" (175 3 cm) (11/01/17 2035)  Weight - Scale: 92 9 kg (204 lb 12 9 oz) (11/01/17 2035)  SpO2: 94 % (11/01/17 2250)    General appearance: alert, appears stated age, cooperative and no distress  Skin:  Abrasions to for head nose and upper extremities  Head: Abrasions to forehead and nose  Eyes: conjunctivae/corneas clear  PERRL, EOM's intact  Lungs: clear to auscultation bilaterally  Heart: regular rate and rhythm  Abdomen: soft, non-tender; bowel sounds normal; no masses,  no organomegaly  Back: negative, range of motion normal  Extremities: edema +1 lower extremities  Neurologic:  Cranial nerves grossly intact strength symmetric speech intact  Psychiatric:  Mood appropriate    Lab Results: I have personally reviewed pertinent reports        Labs:    Results from last 7 days  Lab Units 11/01/17 2045   WBC Thousand/uL 4 56   HEMOGLOBIN g/dL 11 9*   HEMATOCRIT % 37 0   MCV fL 80*   PLATELETS Thousands/uL 101*   INR  1 09       Results from last 7 days  Lab Units 11/01/17 2045   SODIUM mmol/L 134*   POTASSIUM mmol/L 4 4   CHLORIDE mmol/L 99*   CO2 mmol/L 26   ANION GAP mmol/L 9   BUN mg/dL 23   CREATININE mg/dL 1 37*   CALCIUM mg/dL 8 5   ALBUMIN g/dL 3 2*   TOTAL PROTEIN g/dL 8 0   BILIRUBIN TOTAL mg/dL 0 40   ALK PHOS U/L 68   ALT U/L 24   AST U/L 20   EGFR ml/min/1 73sq m 48   GLUCOSE RANDOM mg/dL 421*   MAGNESIUM mg/dL 1 8       Results from last 7 days  Lab Units 11/01/17  2045   CK TOTAL U/L 111   TROPONIN I ng/mL <0 02       Results from last 7 days  Lab Units 11/01/17  2038   POC GLUCOSE mg/dl 360*       Results from last 7 days  Lab Units 11/01/17  2045   TSH 3RD GENERATON uIU/mL 2 621       Cultures:     Results from last 7 days  Lab Units 11/01/17  2323   COLOR UA  Yellow   CLARITY UA  Clear   SPEC GRAV UA  1 020   PH UA  5 5   LEUKOCYTES UA  Elevated glucose may cause decreased leukocyte values  See urine microscopic for [facility] result/*   NITRITE UA  Negative   PROTEIN UA mg/dl Negative   GLUCOSE UA mg/dl >=1000 (1%)*   KETONES UA mg/dl Negative   BILIRUBIN UA  Negative   BLOOD UA  Trace-Intact        Results from last 7 days  Lab Units 11/01/17  2323   RBC UA /hpf None Seen   WBC UA /hpf None Seen   EPITHELIAL CELLS WET PREP /hpf None Seen   BACTERIA UA /hpf None Seen       Results from last 7 days  Lab Units 11/01/17  2323   AMPH/METH  Negative   BARBITURATE UR  Negative   BENZODIAZEPINE UR  Negative   COCAINE UR  Negative   METHADONE URINE  Negative   OPIATE UR  Negative   PCP UR  Negative   THC UR  Negative       Imaging: I have personally reviewed pertinent films in PACS  Xr Wrist 3+ Views Right  Result Date: 11/1/2017  Impression: No acute osseous abnormality  Workstation performed: [id number]     Ct Head Without Contrast  Result Date: 11/1/2017  Impression: No acute intracranial abnormality  Workstation performed: [id number]     Ct Facial Bones Without Contrast  Result Date: 11/1/2017  Impression: No evidence of a facial bone fracture or subluxation  Workstation performed: [id number]     Ct Cervical Spine Without Contrast  Result Date: 11/1/2017  Impression: No cervical spine fracture or traumatic malalignment  Moderate multilevel spondylitic degenerative changes of the cervical spine    Workstation performed: [id number]       EKG, Pathology, and Other Studies Reviewed on Admission: EKG  Result Date: 11/01/17  Impression:  Normal sinus rhythm    [employer] Records Reviewed:  Yes

## 2017-11-02 NOTE — ASSESSMENT & PLAN NOTE
Platelets  trending up from 79  yesaterday to 87 today     Probable chronic conditions baseline result from dating back to 2015 between 100-125   Will continue to hold heparin prophylaxis  Will monitor

## 2017-11-02 NOTE — CASE MANAGEMENT
Initial Clinical Review    Admission: Date/Time/Statement: 11/1/17 @ 2348     Orders Placed This Encounter   Procedures    Inpatient Admission (expected length of stay for this patient is greater than two midnights)     Standing Status:   Standing     Number of Occurrences:   1     Order Specific Question:   Admitting Physician     Answer:   Obed Gao [[id number]]     Order Specific Question:   Level of Care     Answer:   Med Surg [[id number]]     Order Specific Question:   Estimated length of stay     Answer:   More than 2 Midnights     Order Specific Question:   Certification     Answer:   I certify that inpatient services are medically necessary for this patient for a duration of greater than two midnights  See H&P and MD Progress Notes for additional information about the patient's course of treatment  ED: Date/Time/Mode of Arrival:   ED Arrival Information     Expected Arrival Acuity Means of Arrival Escorted By Service Admission Type    - 11/1/2017 20:29 Emergent Walk-In Family Member General Medicine Emergency    Arrival Complaint    fall; facial injury          Chief Complaint:   Chief Complaint   Patient presents with    Fall     Patient fell earlier this morning and hit his face and hurt his right wrist         History of Illness: 81 y o  male who presents with fall  Patient was brought in by the son  Apparently when he woke up this morning he walked to [employer] to  his diabetic test strips  He is supposed to be using a rolling walker however does not feel the need to therefore has placed in the basement of his son's house whom he lives with  Walking back home after picking up his prescriptions, he noticed that instead of getting test strips he got needles  Upon walking back to [facility] aid he had intense pain in right leg causing it to give out which he fell on the pavement  He attempted to soft in the fall with his hands and wrist however ended up hitting his face    He proceeded to continue to [employer] and eventually walked home  When his son saw him this evening after returning from work, he was brought to the hospital for trauma evaluation       Upon my examination, the son has left  There is concerns at the patient is unsafe to be home with family as they are working during the day  In addition he was found to have deep profoundly hyperglycemic therefore admission was requested  Patient at the moment denies any chest pain or shortness of breath  He is complaining of hunger as he has not had anything to eat during the day  ED Vital Signs:   ED Triage Vitals [11/01/17 2035]   Temperature Pulse Respirations Blood Pressure SpO2   98 4 °F (36 9 °C) 83 18 (!) 193/79 99 %      Temp Source Heart Rate Source Patient Position - Orthostatic VS BP Location FiO2 (%)   Temporal Monitor Lying Left arm --      Pain Score       8        Wt Readings from Last 1 Encounters:   11/02/17 91 3 kg (201 lb 4 5 oz)       Vital Signs (abnormal):  Maximum /79  Maximum respiratory rate 28  Abnormal Labs/Diagnostic Test Results:   UA 1% glucose  Na 134, cl 99  Bun 23  Creatinine 1 37  Glucose 421  Albumin 3 2  hgb 11 9    Ct cervical spine - No cervical spine fracture or traumatic malalignment  Moderate multilevel spondylitic degenerative changes of the cervical spine  0133 - glucose 419  Labs 11/2/2017 - na 135  Glucose 399  Albumin 2 5  Wbc 3 29, hgb 10 6, hct 33  Platelets 79  ED Treatment:   Medication Administration from 11/01/2017 2029 to 11/02/2017 0100       Date/Time Order Dose Route Action Action by Comments     11/01/2017 2125 acetaminophen (TYLENOL) tablet 650 mg 650 mg Oral Given Shara Sevilla RN      11/02/2017 0045 OLANZapine (ZyPREXA) tablet 5 mg 5 mg Oral Not Given Elodia Carter RN           Past Medical/Surgical History:    Active Ambulatory Problems     Diagnosis Date Noted    Right ureteral stone 08/13/2016    Hydronephrosis with urinary obstruction due to ureteral calculus 08/13/2016    Diabetes mellitus type 2 in obese ([facility] [address] ) 08/13/2016    Essential hypertension 08/13/2016    Lactic acidosis 08/13/2016    Portal vein thrombosis 08/14/2016    CKD (chronic kidney disease) 08/16/2016    [facility] to [facility] 06/12/2017    Ambulatory dysfunction 06/12/2017    Dementia 06/12/2017    Hyponatremia 06/13/2017    Non compliance w medication regimen 06/15/2017     Resolved Ambulatory Problems     Diagnosis Date Noted    No Resolved Ambulatory Problems     Past Medical History:   Diagnosis Date    Age-related cognitive decline     CKD (chronic kidney disease) stage 3, GFR 30-59 ml/min 08/16/2016    Dementia     Diabetes mellitus ([facility] )     Hearing loss     History of kidney stones     Hypertension     Thrombocytopenia (HCC)        Admitting Diagnosis: Facial injury [S09 93XA]  Outbursts of explosive behavior [R46 89]  Closed head injury, initial encounter [S09 90XA]  Abrasion of face, initial encounter [S00 81XA]    Age/Sex: 81 y o  male  Assessment/Plan: Ambulatory dysfunction  Given fall, have Physical therapy evaluate  May need placement  Previously was living independently but after hospitalization this summer he went to [place] for rehab and has been residing with his son  1  Diabetes mellitus type 2 with hyperglycemia  Patient states that he did not get a chance to take his glargine this morning due to fall  Continue lispro and sliding scale restart glargine in the morning  No evidence of acidosis  2  Diabetic neuropathy  Continue gabapentin  3  Chronic kidney disease stage 3  At baseline  4  Essential hypertension  Elevated at time of arrival due to distress  Has subsided  Continue lisinopril  5  Cognitive decline/dementia  Reviewed outpatient records  Currently on donepezil 5 mg q h s   6  Thrombocytopenia    Appears chronic        Admission Orders:  11/1/2017  2349 INPATIENT   Scheduled Meds:   donepezil 5 mg Oral HS   gabapentin 100 mg Oral HS   heparin (porcine) 5,000 Units Subcutaneous Q8H [address]   insulin glargine 30 Units Subcutaneous QAM   insulin lispro 1-5 Units Subcutaneous HS   insulin lispro 1-6 Units Subcutaneous TID AC   insulin lispro 6 Units Subcutaneous TID With Meals   lisinopril 5 mg Oral BID   OLANZapine 5 mg Oral Once   pravastatin 10 mg Oral Daily With Dinner     Continuous Infusions:   sodium chloride 75 mL/hr Last Rate: 75 mL/hr (11/02/17 0120)     PRN Meds: not used:    acetaminophen    influenza vaccine    labetalol    magnesium hydroxide    ondansetron    polyethylene glycol    OTHER ORDERS: fingerstick glucose qid    scds  PT/OT

## 2017-11-02 NOTE — ASSESSMENT & PLAN NOTE
Seen by OT/PT yesterday with recommendation for short term rehab    Case management for possible placement tomorrow

## 2017-11-02 NOTE — ASSESSMENT & PLAN NOTE
Possible uncontrolled diabetes, A1C 9 8  Recommend close follow up with PCP after discharge  Blood sugar improving 191 this morning  Will continue IV NS at 75cc/hr  Continue current inpatient insulin regimen

## 2017-11-02 NOTE — ED NOTES
Ace wrap applied to right wrist per Dr Rebecca Mcrae order - circ check to same adequate     Smith Samuel, RN  11/02/17 4948

## 2017-11-02 NOTE — ASSESSMENT & PLAN NOTE
Platelets  trending down from 101 on admission yesaterday to 79 today  Probably related to chronic conditions baseline result from dating back to 2015 between 100-125 vs heparin prophylaxis  Will discontinue heaparin  Will monitor closely    Order AM labs

## 2017-11-03 PROBLEM — E87.1 HYPONATREMIA: Status: RESOLVED | Noted: 2017-06-13 | Resolved: 2017-11-03

## 2017-11-03 PROBLEM — E55.9 VITAMIN D DEFICIENCY: Status: ACTIVE | Noted: 2017-11-03

## 2017-11-03 LAB
25(OH)D3 SERPL-MCNC: 18.5 NG/ML (ref 30–100)
ALBUMIN SERPL BCP-MCNC: 2.7 G/DL (ref 3.5–5)
ALP SERPL-CCNC: 59 U/L (ref 46–116)
ALT SERPL W P-5'-P-CCNC: 22 U/L (ref 12–78)
ANION GAP SERPL CALCULATED.3IONS-SCNC: 9 MMOL/L (ref 4–13)
AST SERPL W P-5'-P-CCNC: 21 U/L (ref 5–45)
ATRIAL RATE: 78 BPM
BILIRUB SERPL-MCNC: 0.5 MG/DL (ref 0.2–1)
BUN SERPL-MCNC: 16 MG/DL (ref 5–25)
CALCIUM SERPL-MCNC: 8.5 MG/DL (ref 8.3–10.1)
CHLORIDE SERPL-SCNC: 104 MMOL/L (ref 100–108)
CO2 SERPL-SCNC: 24 MMOL/L (ref 21–32)
CREAT SERPL-MCNC: 1.14 MG/DL (ref 0.6–1.3)
ERYTHROCYTE [DISTWIDTH] IN BLOOD BY AUTOMATED COUNT: 14.9 % (ref 11.6–15.1)
FERRITIN SERPL-MCNC: 102 NG/ML (ref 8–388)
FOLATE SERPL-MCNC: 19 NG/ML (ref 3.1–17.5)
GFR SERPL CREATININE-BSD FRML MDRD: 60 ML/MIN/1.73SQ M
GLUCOSE SERPL-MCNC: 148 MG/DL (ref 65–140)
GLUCOSE SERPL-MCNC: 181 MG/DL (ref 65–140)
GLUCOSE SERPL-MCNC: 191 MG/DL (ref 65–140)
GLUCOSE SERPL-MCNC: 271 MG/DL (ref 65–140)
GLUCOSE SERPL-MCNC: 335 MG/DL (ref 65–140)
HCT VFR BLD AUTO: 35.2 % (ref 36.5–49.3)
HGB BLD-MCNC: 11.3 G/DL (ref 12–17)
IRON SATN MFR SERPL: 16 %
IRON SERPL-MCNC: 46 UG/DL (ref 65–175)
MAGNESIUM SERPL-MCNC: 1.7 MG/DL (ref 1.6–2.6)
MCH RBC QN AUTO: 25.6 PG (ref 26.8–34.3)
MCHC RBC AUTO-ENTMCNC: 32.1 G/DL (ref 31.4–37.4)
MCV RBC AUTO: 80 FL (ref 82–98)
P AXIS: 28 DEGREES
PHOSPHATE SERPL-MCNC: 3.3 MG/DL (ref 2.3–4.1)
PLATELET # BLD AUTO: 87 THOUSANDS/UL (ref 149–390)
PMV BLD AUTO: 9.4 FL (ref 8.9–12.7)
POTASSIUM SERPL-SCNC: 4.4 MMOL/L (ref 3.5–5.3)
PR INTERVAL: 202 MS
PROT SERPL-MCNC: 7 G/DL (ref 6.4–8.2)
QRS AXIS: 26 DEGREES
QRSD INTERVAL: 74 MS
QT INTERVAL: 392 MS
QTC INTERVAL: 446 MS
RBC # BLD AUTO: 4.41 MILLION/UL (ref 3.88–5.62)
SODIUM SERPL-SCNC: 137 MMOL/L (ref 136–145)
T WAVE AXIS: 65 DEGREES
TIBC SERPL-MCNC: 293 UG/DL (ref 250–450)
VENTRICULAR RATE: 78 BPM
VIT B12 SERPL-MCNC: 291 PG/ML (ref 100–900)
WBC # BLD AUTO: 4.11 THOUSAND/UL (ref 4.31–10.16)

## 2017-11-03 PROCEDURE — 80053 COMPREHEN METABOLIC PANEL: CPT | Performed by: INTERNAL MEDICINE

## 2017-11-03 PROCEDURE — 83735 ASSAY OF MAGNESIUM: CPT | Performed by: INTERNAL MEDICINE

## 2017-11-03 PROCEDURE — 84100 ASSAY OF PHOSPHORUS: CPT | Performed by: INTERNAL MEDICINE

## 2017-11-03 PROCEDURE — 85027 COMPLETE CBC AUTOMATED: CPT | Performed by: PHYSICIAN ASSISTANT

## 2017-11-03 PROCEDURE — 97110 THERAPEUTIC EXERCISES: CPT

## 2017-11-03 PROCEDURE — 82948 REAGENT STRIP/BLOOD GLUCOSE: CPT

## 2017-11-03 PROCEDURE — 82306 VITAMIN D 25 HYDROXY: CPT | Performed by: INTERNAL MEDICINE

## 2017-11-03 PROCEDURE — 83540 ASSAY OF IRON: CPT | Performed by: INTERNAL MEDICINE

## 2017-11-03 PROCEDURE — 97116 GAIT TRAINING THERAPY: CPT

## 2017-11-03 PROCEDURE — 82728 ASSAY OF FERRITIN: CPT | Performed by: INTERNAL MEDICINE

## 2017-11-03 PROCEDURE — 82607 VITAMIN B-12: CPT | Performed by: INTERNAL MEDICINE

## 2017-11-03 PROCEDURE — 83550 IRON BINDING TEST: CPT | Performed by: INTERNAL MEDICINE

## 2017-11-03 PROCEDURE — 82746 ASSAY OF FOLIC ACID SERUM: CPT | Performed by: INTERNAL MEDICINE

## 2017-11-03 PROCEDURE — 97535 SELF CARE MNGMENT TRAINING: CPT

## 2017-11-03 RX ORDER — MAGNESIUM SULFATE 1 G/100ML
1 INJECTION INTRAVENOUS ONCE
Status: COMPLETED | OUTPATIENT
Start: 2017-11-03 | End: 2017-11-03

## 2017-11-03 RX ORDER — MELATONIN
2000 DAILY
Status: DISCONTINUED | OUTPATIENT
Start: 2017-11-04 | End: 2017-11-04 | Stop reason: HOSPADM

## 2017-11-03 RX ADMIN — INSULIN LISPRO 6 UNITS: 100 INJECTION, SOLUTION INTRAVENOUS; SUBCUTANEOUS at 07:52

## 2017-11-03 RX ADMIN — SODIUM CHLORIDE 75 ML/HR: 0.9 INJECTION, SOLUTION INTRAVENOUS at 03:18

## 2017-11-03 RX ADMIN — INSULIN LISPRO 5 UNITS: 100 INJECTION, SOLUTION INTRAVENOUS; SUBCUTANEOUS at 11:59

## 2017-11-03 RX ADMIN — DONEPEZIL HYDROCHLORIDE 5 MG: 5 TABLET, FILM COATED ORAL at 21:52

## 2017-11-03 RX ADMIN — INSULIN GLARGINE 30 UNITS: 100 INJECTION, SOLUTION SUBCUTANEOUS at 08:21

## 2017-11-03 RX ADMIN — GABAPENTIN 100 MG: 100 CAPSULE ORAL at 21:52

## 2017-11-03 RX ADMIN — INSULIN LISPRO 2 UNITS: 100 INJECTION, SOLUTION INTRAVENOUS; SUBCUTANEOUS at 07:51

## 2017-11-03 RX ADMIN — PRAVASTATIN SODIUM 10 MG: 20 TABLET ORAL at 16:49

## 2017-11-03 RX ADMIN — SODIUM CHLORIDE 75 ML/HR: 0.9 INJECTION, SOLUTION INTRAVENOUS at 17:25

## 2017-11-03 RX ADMIN — LISINOPRIL 5 MG: 5 TABLET ORAL at 17:26

## 2017-11-03 RX ADMIN — INSULIN LISPRO 6 UNITS: 100 INJECTION, SOLUTION INTRAVENOUS; SUBCUTANEOUS at 16:49

## 2017-11-03 RX ADMIN — MAGNESIUM SULFATE HEPTAHYDRATE 1 G: 1 INJECTION, SOLUTION INTRAVENOUS at 08:21

## 2017-11-03 RX ADMIN — LISINOPRIL 5 MG: 5 TABLET ORAL at 08:21

## 2017-11-03 RX ADMIN — INSULIN LISPRO 4 UNITS: 100 INJECTION, SOLUTION INTRAVENOUS; SUBCUTANEOUS at 16:49

## 2017-11-03 RX ADMIN — INSULIN LISPRO 6 UNITS: 100 INJECTION, SOLUTION INTRAVENOUS; SUBCUTANEOUS at 11:59

## 2017-11-03 NOTE — OCCUPATIONAL THERAPY NOTE
OT Note     11/03/17 0955   Restrictions/Precautions   Other Precautions Chair Alarm; Bed Alarm; Fall Risk;Multiple lines   ADL   Where Assessed Edge of bed   Grooming Assistance 5  Supervision/Setup   Grooming Comments In stance sinkside   UB Bathing Assistance 5  Supervision/Setup   UB Bathing Deficit Setup   UB Bathing Comments A with back   LB Bathing Assistance 5  Supervision/Setup   LB Bathing Comments S lesa/buttocks in stance, completes thighs to knees   UB Dressing Assistance 4  Minimal Assistance   UB Dressing Comments To manage lines and fasteners   LB Dressing Assistance 5  Supervision/Setup   LB Dressing Comments Doffs/dons non skids and pull ups without difficulty   Bed Mobility   Supine to Sit 5  Supervision   Additional items Bedrails   Transfers   Sit to Stand 5  Supervision   Additional items Verbal cues   Stand to Sit 5  Supervision   Additional items Verbal cues   Functional Mobility   Functional Mobility 5  Supervision   Additional Comments Completes txfr EOB to sinkside for orql care then to bathroom and to recliner   Additional items (IV pole push thru mobility)   Activity Tolerance   Activity Tolerance Patient tolerated treatment well   Assessment   Assessment Presents supine, agreeable to participate  Completes a m  self care as per above with set up all items  Noted to be impulsive thru txfr to BR, appears unaware of IV line  Returned recliner at bedside  Call bell and phone in reach  Chair alarm activated     Recommendation   Recommendation (Continue OT services )

## 2017-11-03 NOTE — PROGRESS NOTES
[facility] Internal Medicine  Progress Note - Anson Toure 81 y o  male MRN: [id number]    Unit/Bed#: 411-01 Encounter: [id number]        Hyperglycemia due to type 2 diabetes mellitus ([address] )   Assessment & Plan    Possible uncontrolled diabetes, A1C 9 8  Recommend close follow up with PCP after discharge  Blood sugar improving 191 this morning  Will continue IV NS at 75cc/hr  Continue current inpatient insulin regimen        * Gait instability   Assessment & Plan    Will follow OT/PT recommendations  Case management to follow up on placement for tomorrow        Anemia   Assessment & Plan    Chronic condition  Hemoglobin 11 3 today  Stable with no associated symptoms, no evidence of bleeding  Will Monitor        Thrombocytopenia (HCC)   Assessment & Plan    Platelets  trending up from 79  yesaterday to 87 today  Probable chronic conditions baseline result from dating back to 2015 between 100-125   Will continue to hold heparin prophylaxis  Will monitor         CKD (chronic kidney disease) stage 3, GFR 30-59 ml/min   Assessment & Plan    CKD is stable  Creatinine is at 1 14 today  Will continue IV NS at 75cc/hr             Ambulatory dysfunction   Assessment & Plan    Seen by OT/PT yesterday with recommendation for short term rehab  Case management for possible placement tomorrow          Age-related cognitive decline   Assessment & Plan    Alert and oriented today  Observe for new changes in mental status  Continue current medications donepezil 5mg PO daily        Dementia   Assessment & Plan    Continue Donepezil 5mg PO HS  Essential hypertension   Assessment & Plan    Blood pressure stable    Continue Lisinopril        Leukopenia   Assessment & Plan    Stable  Will monitor        Hypertensionresolved as of 11/2/2017   Assessment & Plan    Blood pressure is controlled   Continue Labatalol 10mg IV , Lisinopril tablet 5mg PO              VTE Pharmacologic Prophylaxis:   Pharmacologic: Pharmacologic VTE Prophylaxis contraindicated due to thrombocytopenia  Mechanical VTE Prophylaxis in Place: No    Patient Centered Rounds: I have performed bedside rounds with nursing staff today  Discussions with Specialists or Other Care Team Provider: Primary RN    Education and Discussions with Family / Patient: Discussion with patient regarding his blood sugar management and possibility of placement  Time Spent for Care: 20 minutes  More than 50% of total time spent on counseling and coordination of care as described above  Current Length of Stay: 2 day(s)    Current Patient Status: Inpatient   Certification Statement: The patient will continue to require additional inpatient hospital stay due to case management arrangement for placement tomorrow    Discharge Plan:  Plan discharge for tomorrow pending placement at [place] [address] Status: Level 1 - Full Code      Subjective:   Patient complains of slight pain and stiffness in left thumb, no other complaints  Pt expressed concerns about going to NH for rehab  Objective:     Vitals:   Temp (24hrs), Av 7 °F (36 5 °C), Min:97 6 °F (36 4 °C), Max:97 9 °F (36 6 °C)    HR:  [63-65] 65  Resp:  [18] 18  BP: (126-147)/(65-68) 126/65  SpO2:  [96 %] 96 %  Body mass index is 29 72 kg/m²  Input and Output Summary (last 24 hours): Intake/Output Summary (Last 24 hours) at 17 1014  Last data filed at 17 0851   Gross per 24 hour   Intake             2910 ml   Output             1825 ml   Net             1085 ml       Physical Exam:     Physical Exam   Constitutional: He is oriented to person, place, and time  He appears well-developed  No distress  HENT:   Head: Normocephalic  Healing abrasion to nose bridge   Eyes: Conjunctivae are normal  Pupils are equal, round, and reactive to light  Neck: Normal range of motion  Neck supple  No JVD present  Cardiovascular: Normal rate and regular rhythm  No murmur heard    Pulmonary/Chest: Effort normal  No stridor  No respiratory distress  He has no wheezes  Abdominal: Soft  He exhibits no distension  There is no tenderness  Musculoskeletal: Normal range of motion  He exhibits no edema  Mild discoloration and swelling to right thumb   Neurological: He is oriented to person, place, and time  Skin: Skin is warm and dry  Psychiatric: He has a normal mood and affect  His behavior is normal    Nursing note and vitals reviewed  Additional Data:     Labs:      Results from last 7 days  Lab Units 11/03/17  0506  11/01/17  2045   WBC Thousand/uL 4 11*  < > 4 56   HEMOGLOBIN g/dL 11 3*  < > 11 9*   HEMATOCRIT % 35 2*  < > 37 0   PLATELETS Thousands/uL 87*  < > 101*   NEUTROS PCT %  --   --  65   LYMPHS PCT %  --   --  24   MONOS PCT %  --   --  8   EOS PCT %  --   --  3   < > = values in this interval not displayed  Results from last 7 days  Lab Units 11/03/17  0506   SODIUM mmol/L 137   POTASSIUM mmol/L 4 4   CHLORIDE mmol/L 104   CO2 mmol/L 24   BUN mg/dL 16   CREATININE mg/dL 1 14   CALCIUM mg/dL 8 5   TOTAL PROTEIN g/dL 7 0   BILIRUBIN TOTAL mg/dL 0 50   ALK PHOS U/L 59   ALT U/L 22   AST U/L 21   GLUCOSE RANDOM mg/dL 181*       Results from last 7 days  Lab Units 11/01/17  2045   INR  1 09       * I Have Reviewed All Lab Data Listed Above  * Additional Pertinent Lab Tests Reviewed:  [facility] Admission Reviewed    Imaging:    Imaging Reports Reviewed Today Include: None  Imaging Personally Reviewed by Myself Includes:  None    Recent Cultures (last 7 days):           Last 24 Hours Medication List:     donepezil 5 mg Oral HS   gabapentin 100 mg Oral HS   insulin glargine 30 Units Subcutaneous QAM   insulin lispro 1-5 Units Subcutaneous HS   insulin lispro 1-6 Units Subcutaneous TID AC   insulin lispro 6 Units Subcutaneous TID With Meals   lisinopril 5 mg Oral BID   pravastatin 10 mg Oral Daily With Dinner        Today, Patient Was Seen By: Candace Urbano PA-C    ** Please Note: Dictation voice to text software may have been used in the creation of this document   **

## 2017-11-03 NOTE — SOCIAL WORK
Pt was accepted at [facility] in [facility]  Pt was refusing this am, and CM informed pt's son who stated pt absolutely cannot return home with him and needs to go to a nursing home  But son himself did not tell his dad this, he wanted CM to tell pt the state made this decision and that he has to go  CM explained to pt's son that we cannot force his dad to go to a nursing home, that we can only let him know our concerns and recommendations and to let pt know that his son will not let him return to his home [place]  Pt aware that he cannot go to his sons home [place] and at this time has no where else to go  Pt agreeable to going to [facility] in [facility] short term until their  can help him with getting back into the hi rise and getting area on aging services as well  CM did speak with both Louisa Stuart and Lucita Clemente at [facility] (as pt's son had been in contact with Kyle Chamberlain yesterday)  Aging aware that pt will be going to [facility] in [facility] tomorrow and of pt's intentions of wanting to get back into the hi rise and not wanting to stay in a nursing home permanantly   Aging will await a call from Carol More when the time comes and they will try and assist

## 2017-11-03 NOTE — PHYSICIAN ADVISOR
Current patient class: Inpatient  The patient is currently on Hospital Day: 3      The patient was admitted to the hospital at [address] [facility] on 11/1/17 for the following diagnosis:  Facial injury [S09 93XA]  Outbursts of explosive behavior [R46 89]  Closed head injury, initial encounter [S09 90XA]  Abrasion of face, initial encounter [S00 81XA]       There is documentation in the medical record of an expected length of stay of at least 2 midnights  The patient is therefore expected to satisfy the 2 midnight benchmark and given the 2 midnight presumption is appropriate for INPATIENT ADMISSION  Given this expectation of a satisfying stay, CMS instructs us that the patient is most often appropriate for inpatient admission under part A provided medical necessity is documented in the chart  After review of the relevant documentation, labs, vital signs and test results, the patient is appropriate for INPATIENT ADMISSION  Admission to the hospital as an inpatient is a complex decision making process which requires the practitioner to consider the patients presenting complaint, history and physical examination and all relevant testing  With this in mind, in this case, the patient was deemed appropriate for INPATIENT ADMISSION  After review of the documentation and testing available at the time of the admission I concur with this clinical determination of medical necessity  Rationale is as follows:     The patient is a 81 yrs old Male who presented to the ED at 11/1/2017  8:31 PM with a chief complaint of Fall (Patient fell earlier this morning and hit his face and hurt his right wrist  )    The patients vitals on arrival were ED Triage Vitals [11/01/17 2035]   Temperature Pulse Respirations Blood Pressure SpO2   98 4 °F (36 9 °C) 83 18 (!) 193/79 99 %      Temp Source Heart Rate Source Patient Position - Orthostatic VS BP Location FiO2 (%)   Temporal Monitor Lying Left arm --      Pain Score       8           Past Medical History:   Diagnosis Date    Age-related cognitive decline     CKD (chronic kidney disease) stage 3, GFR 30-59 ml/min 08/16/2016    Dementia     Diabetes mellitus ([address] )     Hearing loss     History of kidney stones     Hypertension     Thrombocytopenia (HCC)      Past Surgical History:   Procedure Laterality Date    APPENDECTOMY      CYSTOSCOPY Right 8/14/2016    Procedure: CYSTOSCOPY, right uretereoscopy,, stone extraction, stent ;  Surgeon: Marsha Castelan MD;  Location: [facility];  Service:     NEPHRECTOMY      THYROIDECTOMY             Consults have been placed to:   IP CONSULT TO CASE MANAGEMENT    Vitals:    11/02/17 1554 11/02/17 2319 11/03/17 0729 11/03/17 1532   BP: 144/68 147/67 126/65 161/73   Pulse: 63 63 65 74   Resp: 18 18 18 16   Temp: 97 6 °F (36 4 °C) 97 9 °F (36 6 °C) 97 7 °F (36 5 °C) 97 8 °F (36 6 °C)   TempSrc: Temporal Temporal Temporal Temporal   SpO2: 96% 96% 96% 96%   Weight:       Height:           Most recent labs:    Recent Labs      11/01/17   2045  11/02/17   0451  11/03/17   0506   WBC  4 56  3 29*  4 11*   HGB  11 9*  10 6*  11 3*   HCT  37 0  33 0*  35 2*   PLT  101*  79*  87*   K  4 4  4 5  4 4   NA  134*  135*  137   CALCIUM  8 5  8 4  8 5   BUN  23  22  16   CREATININE  1 37*  1 29  1 14   INR  1 09   --    --    TROPONINI  <0 02   --    --    CKTOTAL  111   --    --    AST  20  17  21   ALT  24  19  22   ALKPHOS  68  58  59   BILITOT  0 40  0 30  0 50       Scheduled Meds:  [START ON 11/4/2017] cholecalciferol 2,000 Units Oral Daily   donepezil 5 mg Oral HS   gabapentin 100 mg Oral HS   insulin glargine 30 Units Subcutaneous QAM   insulin lispro 1-5 Units Subcutaneous HS   insulin lispro 1-6 Units Subcutaneous TID AC   insulin lispro 6 Units Subcutaneous TID With Meals   lisinopril 5 mg Oral BID   pravastatin 10 mg Oral Daily With Dinner     Continuous Infusions:  sodium chloride 75 mL/hr Last Rate: 75 mL/hr (11/03/17 1725)     PRN Meds:   acetaminophen   influenza vaccine    labetalol    magnesium hydroxide    ondansetron    polyethylene glycol

## 2017-11-03 NOTE — SOCIAL WORK
Cm arranged transport for pt to go to [facility] in [place] tomorrow 11/4  [address] [facility] ([facility] to transport pt at 12pm  CM notified [facility] central intake dept and also sent a message on [employer] with  time

## 2017-11-03 NOTE — ASSESSMENT & PLAN NOTE
Chronic condition  Hemoglobin 11 3 today  Stable with no associated symptoms, no evidence of bleeding  Will Monitor

## 2017-11-03 NOTE — PHYSICAL THERAPY NOTE
PT Treatment Note      11/03/17 [facility]   Pain Assessment   Pain Score No Pain   Restrictions/Precautions   Other Precautions (Chair Alarm;Multiple lines;Telemetry; Fall Risk;Pain)   Subjective   Subjective Agreeable to therapy   Bed Mobility   Additional Comments pt seated in chair at start and end of session   Transfers   Sit to Stand 5  Supervision   Additional items Verbal cues   Stand to Sit 5  Supervision   Additional items Verbal cues   Stand pivot (CGA)   Additional items Verbal cues   Ambulation/Elevation   Gait pattern (Narrow SOY; Short stride (decreased step length))   Gait Assistance (CGA)   Assistive Device (pushing IV pole)   Distance 450'    Balance   Static Sitting Good   Dynamic Sitting Good   Static Standing Fair   Dynamic Standing Fair   Ambulatory Fair   Endurance Deficit   Endurance Deficit Yes   Endurance Deficit Description limited ambulation tolerance due to hip soreness with prolonged ambulation   Activity Tolerance   Activity Tolerance Patient limited by fatigue   Exercises   Hip Flexion 20 reps   Hip Abduction 20 reps   Hip Adduction 20 reps   Knee AROM Long Arc Quad 20 reps   Ankle Pumps 20 reps   Assessment   Prognosis Good   Problem List Decreased strength;Decreased endurance; Impaired balance;Decreased mobility; Decreased safety awareness; Impaired judgement   Assessment Pt  performing functional mobility requiring CGA for safety and fall prevention  Decreased safety awareness increasing fall risk  Pt is in need of continued activity in PT to improve strength balance endurance safety awareness and functional mobility   Plan   Treatment/Interventions Functional transfer training;LE strengthening/ROM; Therapeutic exercise; Endurance training;Bed mobility;Gait training   Progress Progressing toward goals   Recommendation   Recommendation Short-term skilled PT   Pt  OOB with call bell within reach  and alarm on at end of PT session

## 2017-11-03 NOTE — MALNUTRITION/BMI
This medical record reflects one or more clinical indicators suggestive of malnutrition and/or morbid obesity  Please indicate the one diagnosis below which you feel best reflects the clinical picture  Malnutrition Findings:   chronic illness  other severe protein calorie malnutrition     severe protein calorie malnutrition r/t chronic illness aeb fat/muscle wasting: clavicles protruding, shoulders rounded, orbitals dark/hollowed    BMI Findings:  25-29 9    Body mass index is 29 72 kg/m²  See Nutrition note dated 11/03/2017 for additional details  Completed nutrition assessment is viewable in the nutrition documentation

## 2017-11-03 NOTE — PHYSICAL THERAPY NOTE
PT Treatment Note      11/03/17 1440   Pain Assessment   Pain Score No Pain   Restrictions/Precautions   Other Precautions (Chair Alarm;Multiple lines;Telemetry; Fall Risk;Pain))   Bed Mobility   Additional Comments OOB in chair at start and end of PT Session   Transfers   Sit to Stand 5  Supervision   Additional items Verbal cues   Stand to Sit 5  Supervision   Additional items Verbal cues   Stand pivot 5  Supervision   Ambulation/Elevation   Gait pattern (Narrow SOY; Short stride (decreased step length))   Gait Assistance (CGA)   Assistive Device (pushing IV pole)   Distance 450'   Balance   Static Sitting Good   Dynamic Sitting Good   Static Standing Fair   Dynamic Standing Fair   Ambulatory Fair   Activity Tolerance   Activity Tolerance Patient limited by fatigue   Exercises   Hip Flexion 20 reps   Hip Abduction 20 reps   Hip Adduction 20 reps   Knee AROM Long Arc Quad 20 reps   Ankle Pumps 20 reps   Assessment   Prognosis Good   Problem List Decreased strength;Decreased endurance; Impaired balance;Decreased mobility; Decreased safety awareness; Impaired judgement   Assessment Pt  performing functional mobility requiring CGA for safety and fall prevention  Pt is in need of continued activity in PT to improve strength balance endurance safety awareness and functional mobility   Plan   Treatment/Interventions Functional transfer training;LE strengthening/ROM; Therapeutic exercise; Endurance training;Bed mobility;Gait training   Progress Progressing toward goals   Recommendation   Recommendation Short-term skilled PT   Pt  OOB with call bell within reach,  and alarm on at end of PT session

## 2017-11-03 NOTE — ED PROVIDER NOTES
History  Chief Complaint   Patient presents with    Fall     Patient fell earlier this morning and hit his face and hurt his right wrist       Patient: Troy Navas  81 y o /male  YOB: 1935  MRN: [id number]  PCP: Trace Antoine DO  Date of evaluation: 11/03/17    (N B  Dictation software may have been used in the preparation of this document )    Family had patient brought in when they found out he had taken a fall earlier in the day, striking his face and hurting his right wrist   He had not reported this fall to them  At present he denies any headache, confusion, weakness, changes in vision or hearing  He denies neck pain, back pain, chest pain, abdominal pain, extremity pain other than his right wrist soreness  Family report that his mental status appears to be much worse, progressing gradually over the course of weeks  He has periods of relative calm and clarity, alternating with periods of labile emotions and confusion  They are frightened of his outbursts at home  They do not believe he has had any recent fevers, cough, vomiting, diarrhea, dysuria, rashes or skin sores  Prior to Admission Medications   Prescriptions Last Dose Informant Patient Reported?  Taking?   donepezil (ARICEPT) 5 mg tablet   Yes Yes   Sig: Take 5 mg by mouth daily at bedtime   gabapentin (NEURONTIN) 100 mg capsule   Yes Yes   Sig: Take 100 mg by mouth daily at bedtime   insulin glargine (LANTUS) 100 units/mL subcutaneous injection   No Yes   Sig: Inject 30 Units under the skin every morning   insulin lispro (HumaLOG) 100 units/mL injection   No Yes   Sig: Inject 1-5 Units under the skin daily at bedtime   insulin lispro (HumaLOG) 100 units/mL injection   No Yes   Sig: Inject 1-6 Units under the skin 3 (three) times a day before meals   insulin lispro (HumaLOG) 100 units/mL injection   No Yes   Sig: Inject 6 Units under the skin 3 (three) times a day with meals   lisinopril (ZESTRIL) 5 mg tablet   Yes No Sig: Take 5 mg by mouth 2 (two) times a day  pravastatin (PRAVACHOL) 10 mg tablet   Yes No   Sig: Take 10 mg by mouth daily at bedtime  Facility-Administered Medications: None       Past Medical History:   Diagnosis Date    Age-related cognitive decline     CKD (chronic kidney disease) stage 3, GFR 30-59 ml/min 08/16/2016    Dementia     Diabetes mellitus ([address] )     Hearing loss     History of kidney stones     Hypertension     Thrombocytopenia (HCC)        Past Surgical History:   Procedure Laterality Date    APPENDECTOMY      CYSTOSCOPY Right 8/14/2016    Procedure: CYSTOSCOPY, right uretereoscopy,, stone extraction, stent ;  Surgeon: Sonam Cordoba MD;  Location: [facility];  Service:     NEPHRECTOMY      THYROIDECTOMY         History reviewed  No pertinent family history  I have reviewed and agree with the history as documented  Social History   Substance Use Topics    Smoking status: Former Smoker     Quit date: 1953    Smokeless tobacco: Never Used    Alcohol use No      Comment: patient denies drinking alcohol        Review of Systems   Unable to perform ROS: Dementia       Physical Exam  ED Triage Vitals [11/01/17 2035]   Temperature Pulse Respirations Blood Pressure SpO2   98 4 °F (36 9 °C) 83 18 (!) 193/79 99 %      Temp Source Heart Rate Source Patient Position - Orthostatic VS BP Location FiO2 (%)   Temporal Monitor Lying Left arm --      Pain Score       8           Orthostatic Vital Signs  Vitals:    11/02/17 0112 11/02/17 0744 11/02/17 1554 11/02/17 2319   BP: (!) 179/76 134/67 144/68 147/67   Pulse: 72 68 63 63   Patient Position - Orthostatic VS: Lying Sitting Lying Lying       Physical Exam   Constitutional: He is oriented to person, place, and time  He appears well-developed and well-nourished  HENT:   Head: Head is without raccoon's eyes and without Gaston's sign         Mouth/Throat: Oropharynx is clear and moist and mucous membranes are normal    Voice normal Eyes: EOM are normal  Pupils are equal, round, and reactive to light  Cardiovascular: Normal rate and regular rhythm  Pulmonary/Chest: Effort normal    Abdominal: Soft  Bowel sounds are normal    Neurological: He is alert and oriented to person, place, and time  He has normal strength  GCS eye subscore is 4  GCS verbal subscore is 5  GCS motor subscore is 6  Skin: Skin is warm and dry  Psychiatric: He has a normal mood and affect  His speech is normal and behavior is normal  Cognition and memory are impaired  He expresses inappropriate judgment  He is attentive  Nursing note and vitals reviewed        ED Medications  Medications   insulin glargine (LANTUS) subcutaneous injection 30 Units (30 Units Subcutaneous Given 11/2/17 0821)   insulin lispro (HumaLOG) 100 units/mL subcutaneous injection 6 Units (6 Units Subcutaneous Given 11/2/17 1611)   lisinopril (ZESTRIL) tablet 5 mg (5 mg Oral Given 11/2/17 1708)   pravastatin (PRAVACHOL) tablet 10 mg (10 mg Oral Given 11/2/17 1610)   sodium chloride 0 9 % infusion (75 mL/hr Intravenous New Bag 11/3/17 0318)   insulin lispro (HumaLOG) 100 units/mL subcutaneous injection 1-6 Units (3 Units Subcutaneous Given 11/2/17 1611)   insulin lispro (HumaLOG) 100 units/mL subcutaneous injection 1-5 Units (1 Units Subcutaneous Given 11/2/17 2103)   acetaminophen (TYLENOL) tablet 650 mg (not administered)   polyethylene glycol (MIRALAX) packet 17 g (not administered)   labetalol (NORMODYNE) injection 10 mg (not administered)   magnesium hydroxide (MILK OF MAGNESIA) 400 mg/5 mL oral suspension 30 mL (not administered)   ondansetron (ZOFRAN) injection 4 mg (not administered)   donepezil (ARICEPT) tablet 5 mg (5 mg Oral Given 11/2/17 2101)   gabapentin (NEURONTIN) capsule 100 mg (100 mg Oral Given 11/2/17 2101)   influenza inactivated quadrivalent vaccine (FLULAVAL) IM injection 0 5 mL (not administered)   acetaminophen (TYLENOL) tablet 650 mg (650 mg Oral Given 11/1/17 2125) insulin lispro (HumaLOG) 100 units/mL subcutaneous injection **AcuDose Override Pull** (4 Units Subcutaneous Given 11/2/17 0141)   heparin (porcine) 5,000 units/mL subcutaneous injection **AcuDose Override Pull** (5,000 Units  Given 11/2/17 0141)   gabapentin (NEURONTIN) 100 mg capsule **AcuDose Override Pull** (100 mg  Given 11/2/17 0141)   donepezil (ARICEPT) 5 mg tablet **AcuDose Override Pull** (5 mg  Given 11/2/17 0141)       Diagnostic Studies  Results Reviewed     Procedure Component Value Units Date/Time    CK (with reflex to MB) [[id number]]  (Normal) Collected:  11/01/17 2045    Lab Status:  Final result Specimen:  Blood from Arm, Right Updated:  11/01/17 2352     Total  U/L     Rapid drug screen, urine [[id number]]  (Normal) Collected:  11/01/17 2323    Lab Status:  Final result Specimen:  Urine from Urine, Clean Catch Updated:  11/01/17 2348     Amph/Meth UR Negative     Barbiturate Ur Negative     Benzodiazepine Urine Negative     Cocaine Urine Negative     Methadone Urine Negative     Opiate Urine Negative     PCP Ur Negative     THC Urine Negative    Narrative:         FOR MEDICAL PURPOSES ONLY  IF CONFIRMATION NEEDED PLEASE CONTACT THE LAB WITHIN 5 DAYS      Drug Screen Cutoff Levels:  AMPHETAMINE/METHAMPHETAMINES  1000 ng/mL  BARBITURATES     200 ng/mL  BENZODIAZEPINES     200 ng/mL  COCAINE      300 ng/mL  METHADONE      300 ng/mL  OPIATES      300 ng/mL  PHENCYCLIDINE     25 ng/mL  THC       50 ng/mL    Urine Microscopic [[id number]]  (Normal) Collected:  11/01/17 2323    Lab Status:  Final result Specimen:  Urine from Urine, Clean Catch Updated:  11/01/17 2340     RBC, UA None Seen /hpf      WBC, UA None Seen /hpf      Epithelial Cells None Seen /hpf      Bacteria, UA None Seen /hpf     UA w Reflex to Microscopic [[id number]]  (Abnormal) Collected:  11/01/17 2323    Lab Status:  Final result Specimen:  Urine from Urine, Clean Catch Updated:  11/01/17 2331     Color, UA Yellow     Clarity, UA Clear     Specific [place], UA 1 020     pH, UA 5 5     Leukocytes, UA Elevated glucose may cause decreased leukocyte values  See urine microscopic for [facility] result/ (A)     Nitrite, UA Negative     Protein, UA Negative mg/dl      Glucose, UA >=1000 (1%) (A) mg/dl      Ketones, UA Negative mg/dl      Urobilinogen, UA 0 2 E U /dl      Bilirubin, UA Negative     Blood, UA Trace-Intact    Acetone [[id number]]  (Normal) Collected:  11/01/17 2045    Lab Status:  Final result Specimen:  Blood from Arm, Right Updated:  11/01/17 2124     Acetone, Bld Negative    Acetaminophen level [[id number]]  (Abnormal) Collected:  11/01/17 2045    Lab Status:  Final result Specimen:  Blood from Arm, Right Updated:  11/01/17 2118     Acetaminophen Level <2 (L) ug/mL     Comprehensive metabolic panel [[id number]]  (Abnormal) Collected:  11/01/17 2045    Lab Status:  Final result Specimen:  Blood from Arm, Right Updated:  11/01/17 2118     Sodium 134 (L) mmol/L      Potassium 4 4 mmol/L      Chloride 99 (L) mmol/L      CO2 26 mmol/L      Anion Gap 9 mmol/L      BUN 23 mg/dL      Creatinine 1 37 (H) mg/dL      Glucose 421 (H) mg/dL      Calcium 8 5 mg/dL      AST 20 U/L      ALT 24 U/L      Alkaline Phosphatase 68 U/L      Total Protein 8 0 g/dL      Albumin 3 2 (L) g/dL      Total Bilirubin 0 40 mg/dL      eGFR 48 ml/min/1 73sq m     Narrative:         National Kidney Disease Education Program recommendations are as follows:  GFR calculation is accurate only with a steady state creatinine  Chronic Kidney disease less than 60 ml/min/1 73 sq  meters  Kidney failure less than 15 ml/min/1 73 sq  meters  TSH [[id number]]  (Normal) Collected:  11/01/17 2045    Lab Status:  Final result Specimen:  Blood from Arm, Right Updated:  11/01/17 2118     TSH 3RD GENERATON 2 621 uIU/mL     Narrative:         Patients undergoing fluorescein dye angiography may retain small amounts of fluorescein in the body for 48-72 hours post procedure   Samples containing fluorescein can produce falsely depressed TSH values  If the patient had this procedure,a specimen should be resubmitted post fluorescein clearance  Magnesium [[id number]]  (Normal) Collected:  11/01/17 2045    Lab Status:  Final result Specimen:  Blood from Arm, Right Updated:  11/01/17 2118     Magnesium 1 8 mg/dL     Salicylate level [[id number]]  (Abnormal) Collected:  11/01/17 2045    Lab Status:  Final result Specimen:  Blood from Arm, Right Updated:  57/04/42 9676     Salicylate Lvl <3 (L) mg/dL     Troponin I [[id number]]  (Normal) Collected:  11/01/17 2045    Lab Status:  Final result Specimen:  Blood from Arm, Right Updated:  11/01/17 2111     Troponin I <0 02 ng/mL     Narrative:         [employer] Chemistry analyzer 99% cutoff is > 0 04 ng/mL in network labs    o cTnI 99% cutoff is useful only when applied to patients in the clinical setting of myocardial ischemia  o cTnI 99% cutoff should be interpreted in the context of clinical history, ECG findings and possibly cardiac imaging to establish correct diagnosis  o cTnI 99% cutoff may be suggestive but clearly not indicative of a coronary event without the clinical setting of myocardial ischemia  Ethanol [[id number]]  (Normal) Collected:  11/01/17 2045    Lab Status:  Final result Specimen:  Blood from Arm, Right Updated:  11/01/17 2110     Ethanol Lvl <3 mg/dL     [place] draw [[id number]] Collected:  11/01/17 2045    Lab Status:  Final result Specimen:  Blood Updated:  11/01/17 2107    Narrative: The following orders were created for panel order [place] draw  Procedure                               Abnormality         Status                     ---------                               -----------         ------                     Lorena Walton on BRZA[[id number]]                              Final result                 Please view results for these tests on the individual orders      Shirley Duran [[id number]]  (Normal) Collected:  11/01/17 2045    Lab Status:  Final result Specimen:  Blood from Arm, Right Updated:  11/01/17 2104     Protime 14 0 seconds      INR 1 09    APTT [[id number]]  (Normal) Collected:  11/01/17 2045    Lab Status:  Final result Specimen:  Blood from Arm, Right Updated:  11/01/17 2104     PTT 28 seconds     Narrative: Therapeutic Heparin Range = 60-90 seconds    CBC and differential [[id number]]  (Abnormal) Collected:  11/01/17 2045    Lab Status:  Final result Specimen:  Blood from Arm, Right Updated:  11/01/17 2055     WBC 4 56 Thousand/uL      RBC 4 62 Million/uL      Hemoglobin 11 9 (L) g/dL      Hematocrit 37 0 %      MCV 80 (L) fL      MCH 25 8 (L) pg      MCHC 32 2 g/dL      RDW 15 0 %      MPV 9 2 fL      Platelets 579 (L) Thousands/uL      Neutrophils Relative 65 %      Lymphocytes Relative 24 %      Monocytes Relative 8 %      Eosinophils Relative 3 %      Basophils Relative 0 %      Neutrophils Absolute 2 96 Thousands/µL      Lymphocytes Absolute 1 09 Thousands/µL      Monocytes Absolute 0 37 Thousand/µL      Eosinophils Absolute 0 12 Thousand/µL      Basophils Absolute 0 02 Thousands/µL     Fingerstick Glucose (POCT) [[id number]]  (Abnormal) Collected:  11/01/17 2038    Lab Status:  Final result Updated:  11/01/17 2040     POC Glucose 360 (H) mg/dl                  XR chest 2 views   Final Result by ERIKA Barr MD (11/02 0818)      No active pulmonary disease            Workstation performed: [id number]         XR wrist 3+ views RIGHT   Final Result by Jacqueline Juarez MD (11/01 2319)      No acute osseous abnormality  Workstation performed: [id number]         CT cervical spine without contrast   Final Result by Jacqueline Juarez MD (11/01 2126)      No cervical spine fracture or traumatic malalignment  Moderate multilevel spondylitic degenerative changes of the cervical spine                  Workstation performed: [id number]         CT facial bones without contrast   Final Result by Ester Castleman Eulogio Chaney MD (11/01 2123)      No evidence of a facial bone fracture or subluxation  Workstation performed: [id number]         CT head without contrast   Final Result by Yamilka Maria MD (11/01 2120)      No acute intracranial abnormality  Workstation performed: [id number]                    Procedures  Procedures       Phone Contacts  ED Phone Contact    ED Course  ED Course          HEART Risk Score    Flowsheet Row Most Recent Value   History  0 Filed at: 11/02/2017 0646   ECG  0 Filed at: 11/02/2017 8510   Age  2 Filed at: 11/02/2017 0646   Risk Factors  2 Filed at: 11/02/2017 0646   Troponin  0 Filed at: 11/02/2017 0646   Heart Score Risk Calculator   History  0 Filed at: 11/02/2017 0646   ECG  0 Filed at: 11/02/2017 4549   Age  2 Filed at: 11/02/2017 4018   Risk Factors  2 Filed at: 11/02/2017 0646   Troponin  0 Filed at: 11/02/2017 0646   HEART Score  4 Filed at: 11/02/2017 0646   HEART Score  4 Filed at: 11/02/2017 1139                            MDM  CritCare Time    Disposition  Final diagnoses:   Closed head injury, initial encounter   Abrasion of face, initial encounter   Outbursts of explosive behavior     Time reflects when diagnosis was documented in both MDM as applicable and the Disposition within this note     Time User Action Codes Description Comment    11/1/2017 11:47 PM Jacques Monreal Add [S09 90XA] Closed head injury, initial encounter     11/1/2017 11:47 PM Teri Chandler Add [S00 81XA] Abrasion of face, initial encounter     11/1/2017 11:47 PM Teri Chandler Add [R46 89] Outbursts of explosive behavior       ED Disposition     ED Disposition Condition Comment    Admit  Case was discussed with Dr Christina Villatoro [DO] for [facility]  and the patient's admission status was agreed to be Admission Status: inpatient status to the service of Dr Dr Christina Villatoro [DO] for [facility]             Follow-up Information     Follow up With Specialties Details Why [address] Anushka Diss, DO Hematology and Oncology Call in 1 week(s) Follow-up for your low platelet count  [address]          Current Discharge Medication List      CONTINUE these medications which have NOT CHANGED    Details   donepezil (ARICEPT) 5 mg tablet Take 5 mg by mouth daily at bedtime      gabapentin (NEURONTIN) 100 mg capsule Take 100 mg by mouth daily at bedtime      insulin glargine (LANTUS) 100 units/mL subcutaneous injection Inject 30 Units under the skin every morning  Qty: 10 mL, Refills: 0      !! insulin lispro (HumaLOG) 100 units/mL injection Inject 1-5 Units under the skin daily at bedtime  Qty: 10 mL, Refills: 0      !! insulin lispro (HumaLOG) 100 units/mL injection Inject 1-6 Units under the skin 3 (three) times a day before meals  Qty: 10 mL, Refills: 0      !! insulin lispro (HumaLOG) 100 units/mL injection Inject 6 Units under the skin 3 (three) times a day with meals  Qty: 10 mL, Refills: 0      lisinopril (ZESTRIL) 5 mg tablet Take 5 mg by mouth 2 (two) times a day  pravastatin (PRAVACHOL) 10 mg tablet Take 10 mg by mouth daily at bedtime  !! - Potential duplicate medications found  Please discuss with provider  No discharge procedures on file      ED Provider  Electronically Signed by           Delvin Manuel MD  11/03/17 6547

## 2017-11-04 VITALS
BODY MASS INDEX: 29.81 KG/M2 | WEIGHT: 201.28 LBS | SYSTOLIC BLOOD PRESSURE: 137 MMHG | HEART RATE: 73 BPM | RESPIRATION RATE: 18 BRPM | HEIGHT: 69 IN | TEMPERATURE: 98 F | DIASTOLIC BLOOD PRESSURE: 61 MMHG | OXYGEN SATURATION: 98 %

## 2017-11-04 PROBLEM — S00.81XA ABRASION OF FACE: Status: ACTIVE | Noted: 2017-11-04

## 2017-11-04 PROBLEM — D64.9 ANEMIA: Status: RESOLVED | Noted: 2017-11-02 | Resolved: 2017-11-04

## 2017-11-04 PROBLEM — D72.819 LEUKOPENIA: Status: RESOLVED | Noted: 2017-11-02 | Resolved: 2017-11-04

## 2017-11-04 PROBLEM — S09.93XA FACIAL INJURY: Status: ACTIVE | Noted: 2017-11-04

## 2017-11-04 LAB
ANION GAP SERPL CALCULATED.3IONS-SCNC: 8 MMOL/L (ref 4–13)
BUN SERPL-MCNC: 16 MG/DL (ref 5–25)
CALCIUM SERPL-MCNC: 8.5 MG/DL (ref 8.3–10.1)
CHLORIDE SERPL-SCNC: 106 MMOL/L (ref 100–108)
CO2 SERPL-SCNC: 24 MMOL/L (ref 21–32)
CREAT SERPL-MCNC: 1.18 MG/DL (ref 0.6–1.3)
ERYTHROCYTE [DISTWIDTH] IN BLOOD BY AUTOMATED COUNT: 14.9 % (ref 11.6–15.1)
GFR SERPL CREATININE-BSD FRML MDRD: 58 ML/MIN/1.73SQ M
GLUCOSE SERPL-MCNC: 145 MG/DL (ref 65–140)
GLUCOSE SERPL-MCNC: 210 MG/DL (ref 65–140)
GLUCOSE SERPL-MCNC: 354 MG/DL (ref 65–140)
HCT VFR BLD AUTO: 34.2 % (ref 36.5–49.3)
HGB BLD-MCNC: 11.1 G/DL (ref 12–17)
MAGNESIUM SERPL-MCNC: 1.9 MG/DL (ref 1.6–2.6)
MCH RBC QN AUTO: 25.9 PG (ref 26.8–34.3)
MCHC RBC AUTO-ENTMCNC: 32.5 G/DL (ref 31.4–37.4)
MCV RBC AUTO: 80 FL (ref 82–98)
PLATELET # BLD AUTO: 90 THOUSANDS/UL (ref 149–390)
PMV BLD AUTO: 9.4 FL (ref 8.9–12.7)
POTASSIUM SERPL-SCNC: 4.5 MMOL/L (ref 3.5–5.3)
RBC # BLD AUTO: 4.29 MILLION/UL (ref 3.88–5.62)
SODIUM SERPL-SCNC: 138 MMOL/L (ref 136–145)
WBC # BLD AUTO: 4.2 THOUSAND/UL (ref 4.31–10.16)

## 2017-11-04 PROCEDURE — 82948 REAGENT STRIP/BLOOD GLUCOSE: CPT

## 2017-11-04 PROCEDURE — 80048 BASIC METABOLIC PNL TOTAL CA: CPT | Performed by: INTERNAL MEDICINE

## 2017-11-04 PROCEDURE — 83735 ASSAY OF MAGNESIUM: CPT | Performed by: INTERNAL MEDICINE

## 2017-11-04 PROCEDURE — 85027 COMPLETE CBC AUTOMATED: CPT | Performed by: INTERNAL MEDICINE

## 2017-11-04 RX ORDER — INSULIN GLARGINE 100 [IU]/ML
36 INJECTION, SOLUTION SUBCUTANEOUS EVERY MORNING
Qty: 10 ML | Refills: 0 | Status: SHIPPED | OUTPATIENT
Start: 2017-11-05 | End: 2018-07-16 | Stop reason: HOSPADM

## 2017-11-04 RX ADMIN — VITAMIN D, TAB 1000IU (100/BT) 2000 UNITS: 25 TAB at 08:31

## 2017-11-04 RX ADMIN — INSULIN GLARGINE 30 UNITS: 100 INJECTION, SOLUTION SUBCUTANEOUS at 08:31

## 2017-11-04 RX ADMIN — INSULIN LISPRO 6 UNITS: 100 INJECTION, SOLUTION INTRAVENOUS; SUBCUTANEOUS at 11:18

## 2017-11-04 RX ADMIN — INSULIN LISPRO 2 UNITS: 100 INJECTION, SOLUTION INTRAVENOUS; SUBCUTANEOUS at 08:23

## 2017-11-04 RX ADMIN — INSULIN LISPRO 6 UNITS: 100 INJECTION, SOLUTION INTRAVENOUS; SUBCUTANEOUS at 08:24

## 2017-11-04 RX ADMIN — INSULIN LISPRO 6 UNITS: 100 INJECTION, SOLUTION INTRAVENOUS; SUBCUTANEOUS at 11:19

## 2017-11-04 RX ADMIN — LISINOPRIL 5 MG: 5 TABLET ORAL at 08:31

## 2017-11-04 RX ADMIN — SODIUM CHLORIDE 75 ML/HR: 0.9 INJECTION, SOLUTION INTRAVENOUS at 06:51

## 2017-11-04 NOTE — SOCIAL WORK
The patient will be  d/c to the skilled nursing facility in [facility] care   He signed the imm and he will be transported via [facility] ems and the  time is 12 noon and he is aware and agreeable to the d/c plan

## 2017-11-04 NOTE — PLAN OF CARE
Problem: DISCHARGE PLANNING - CARE MANAGEMENT  Goal: Discharge to post-acute care or home with appropriate resources  INTERVENTIONS:  - Conduct assessment to determine patient/family and health care team treatment goals, and need for post-acute services based on payer coverage, community resources, and patient preferences, and barriers to discharge ( the patient is going to [address] [place] [address] Drive care )  - Address psychosocial, clinical, and financial barriers to discharge as identified in assessment in conjunction with the patient/family and health care team  - Arrange appropriate level of post-acute services according to patient's   needs and preference and payer coverage in collaboration with the physician and health care team  - Communicate with and update the patient/family, physician, and health care team regarding progress on the discharge plan  - Arrange appropriate transportation to post-acute venues  ( [facility] ems )  Outcome: Completed Date Met: 11/04/17  To the snf for str

## 2017-11-04 NOTE — DISCHARGE SUMMARY
Discharge Summary - Zain Belcher 81 y o  male MRN: [id number]  Unit/Bed#: 865-35 Encounter: [id number]    Admission Date: 11/1/2017   Discharge date: 11/4/17    Admitting Diagnosis: Facial injury [S09 93XA]  Outbursts of explosive behavior [R46 89]  Closed head injury, initial encounter [S09 90XA]  Abrasion of face, initial encounter [S00 81XA]     Discharge diagnosis:   Patient Active Problem List   Diagnosis    Essential hypertension    Hyperglycemia due to type 2 diabetes mellitus ([address] )    Ambulatory dysfunction    Dementia    CKD (chronic kidney disease) stage 3, GFR 30-59 ml/min    Age-related cognitive decline    [address]ancytopenia [address] [address] )    Gait instability    Vitamin D deficiency    Abrasion of face       HPI: This patient is a 81year old gentleman who presented to the hospital following a fall  He was cleared from a trauma standpoint, but has known ambulatory dysfunction, a head injury, and found to have marked hyperglycemia so was admitted for further workup and evaluation  Hospital Course: Essentially, the patient was admitted and placed on fall precautions  He was evaluated by PT/OT who felt the patient would benefit from short term rehab on discharge or home PT  Regarding his hyperglycemia, he was started on Novolog sliding scale coverage while in the hospital  His blood glucose remained elevated in the 140s - 200s despite coverage, therefore it's suggested his home Lantus dosing will be increased by 20% on discharge (30 units to 36 units daily)  He should continue frequent blood glucose checks with sliding scale coverage while at short term rehab  The patient was also found to be vitamin D deficient, therefore cholecalciferol was added to his medication regimen  He will need a repeat vitamin D level in 6-8 weeks   Additionally, he was noted to have pancytopenia, which appears chronic and remained stable throughout this stay, but hematology/oncology consultation is requested on discharge  The patient's stay was complicated by the fact that there was some social issues reported  The patient's son is no longer wishing for his father to live at home with them after discharge from rehab  The patient ultimately desires to return back to independent living  This was reported to the [facility] on aging, and [facility] care   Ultimately after a 3 day hospital stay the patient was discharged to [facility] for planned short term rehab  Examination on discharge:   Vitals:    11/03/17 1532 11/03/17 2202 11/04/17 0726 11/04/17 0831   BP: 161/73 153/66 137/61 137/61   Pulse: 74 70 73    Resp: 16 16 18    Temp: 97 8 °F (36 6 °C) 98 4 °F (36 9 °C) 98 °F (36 7 °C)    TempSrc: Temporal Temporal Temporal    SpO2: 96% 94% 98%    Weight:       Height:       Gen: NAD  HEENT: MMM  Lungs: CTA throughout, good effort  Heart: RRR, no murmurs  Ext: Trace edema of the BLE  Skin: abrasion noted on nasal bridge        Discharge instructions/Information to patient and family:   See after visit summary for information provided to patient and family  Provisions for Follow-Up Care:  See after visit summary for information related to follow-up care and any pertinent home health orders  Disposition: Short-term rehab at [address]    Planned Readmission: No    Discharge Statement   I spent 25 minutes discharging the patient  This time was spent on the day of discharge  I had direct contact with the patient on the day of discharge  Additional documentation is required if more than 30 minutes were spent on discharge  Discharge Medications:  See after visit summary for reconciled discharge medications provided to patient and family

## 2017-11-04 NOTE — NURSING NOTE
Patient discharged to [facility] in [place], [place] in stable condition  Patient transported to facility by [facility] via wheelchair  Discharge instructions and pertinent paperwork sent with [facility] transporter   Report called in to receiving facility by Nkechi Sanchez RN

## 2017-12-11 NOTE — SOCIAL WORK
Spoke with Reece Echeverria at [facility]  She is aware of pt's intentions of wanting to go back to the [address] and that his apartment he was in prior is empty  Rudy Men will be contacting [facility] to make a transition to home referral on pt when he is there and also have Aging contact the hirise to try and expedite process of getting pt back in there for when he is dc'd from the SNF  HPI:  66 year-old woman with history of blindness and lupus was brought to [facility] on 11/30/17 for altered mental status and lethargy.  She had experienced an acute episode of nausea/vomiting while eating lunch earlier in the day. Also, she was reportedly "not feeling well" a couple of days prior to current hospitalization. Upon arrival to [facility] ED, her SBP was reported to be in the 200s and she progressively became more lethargic. She was started on nicardipine drip, propofol and intubated.  Non-contrast CT demonstrated diffuse subarachnoid hemorrhage with hydrocephalus. An EVD was placed and she was transferred to [facility].     11/30 Admitted to [facility]. Angio on 12/1 - neg ; 12/2/ evening -130 with LUE weakness and lethargy with improvement on IV bolus and  .  CTA obtained did not demonstarte prox Ant or post circulation spasm yet distal vessel narrowing on R .  CTP- 12/5/17- symmetry yet improved on IT nicardipine/ goal -180   EVD - leaking 12/7/17- cleaned remains dry   12/10 febrile, panCx    Overnight Events: EVD clamped    ICU Vital Signs Last 24 Hrs  T(C): 37.1 (10 Dec 2017 23:00), Max: 37.7 (10 Dec 2017 07:00)  T(F): 98.7 (10 Dec 2017 23:00), Max: 99.9 (10 Dec 2017 07:00)  HR: 60 (11 Dec 2017 01:00) (56 - 93)  BP: --  BP(mean): --  ABP: 162/63 (11 Dec 2017 01:00) (126/58 - 182/85)  ABP(mean): 98 (11 Dec 2017 01:00) (86 - 172)  RR: 19 (11 Dec 2017 01:00) (12 - 27)  SpO2: 98% (11 Dec 2017 01:00) (96% - 100%)  CAPILLARY BLOOD GLUCOSE    Complete Blood Count (12.10.17 @ 22:40)    WBC Count: 11.0 K/uL    RBC Count: 2.99 M/uL    Hemoglobin: 8.9 g/dL    Hematocrit: 26.3 %    Mean Cell Volume: 87.9 fl    Mean Cell Hemoglobin: 29.6 pg    Mean Cell Hemoglobin Conc: 33.7 gm/dL    Red Cell Distrib Width: 12.8 %    Platelet Count - Automated: 364 K/uL    Basic Metabolic Panel - STAT (12.10.17 @ 22:40)    Sodium, Serum: 137 mmol/L    Potassium, Serum: 3.7 mmol/L    Chloride, Serum: 99 mmol/L    Carbon Dioxide, Serum: 27 mmol/L    Anion Gap, Serum: 11 mmol/L    Blood Urea Nitrogen, Serum: 16 mg/dL    Creatinine, Serum: 0.95 mg/dL    Glucose, Serum: 97 mg/dL    Calcium, Total Serum: 8.9 mg/dL    eGFR if Non : 63: Interpretative comment  The units for eGFR are ml/min/1.73m2 (normalized body surface area). The  eGFR is calculated from a serum creatinine using the CKD-EPI equation.  Other variables required for calculation are race, age and sex. Among  patients with chronic kidney disease (CKD), the eGFR is useful in  determining the stage of disease according to KDOQI CKD classification.  All eGFR results are reported numerically with the following  interpretation.          GFR                    With                 Without     (ml/min/1.73 m2)    Kidney Damage       Kidney Damage        >= 90                    Stage 1                     Normal        60-89                    Stage 2                     Decreased GFR        30-59     Stage 3                     Stage 3        15-29                    Stage 4                     Stage 4        < 15                      Stage 5                     Stage 5  Each stage of CKD assumes that the associated GFR level has been in  effect for at least 3 months. Determination of stages one and two (with  eGFR > 59 ml/min/m2) requires estimation of kidney damage for at least 3  months as defined by structural or functional abnormalities.  Limitations: All estimates of GFR will be less accurate for patients at  extremes of muscle mass (including but not limited to frail elderly,  critically ill, or cancer patients), those with unusual diets, and those  with conditions associated with reduced secretion or extrarenal  elimination of creatinine. The eGFR equation is not recommended for use  in patients with unstable creatinine levels. mL/min/1.73M2    eGFR if African American: 73 mL/min/1.73M2          MEDS:  MEDICATIONS  (STANDING):  ceFAZolin   IVPB 1000 milliGRAM(s) IV Intermittent every 8 hours  enoxaparin Injectable 40 milliGRAM(s) SubCutaneous <User Schedule>  senna 2 Tablet(s) Oral at bedtime  sodium chloride 0.9% with potassium chloride 20 mEq/L 1000 milliLiter(s) (50 mL/Hr) IV Continuous <Continuous>      [All pertinent recent Imaging/Reports reviewed]    DEVICES: [] Restraints [] AMIRA/HMV []LD [] ET tube [] Trach [] A-line [] Stark [] NGT [] Rectal Tube       EXAMINATION:  Constitutional: No Acute Distress     Neurological: opens eyes to voice and follows commands. Pupils 2mm and reactive  B/L ; slow to follow and limited speech output, neck always rotated to the left.   Motor exam: Full strength throughout                                                      Sensation: [X ] intact to light touch     normal tone    Pulmonary: Clear to Auscultation, No rales, No rhonchi, No wheezes     Cardiovascular: S1, S2, Regular rate and rhythm     Gastrointestinal: Soft, Non-tender, Non-distended     Extremities: No calf tendernes

## 2017-12-21 ENCOUNTER — GENERIC CONVERSION - ENCOUNTER (OUTPATIENT)
Dept: OTHER | Facility: OTHER | Age: 82
End: 2017-12-21

## 2018-01-09 NOTE — MISCELLANEOUS
Assessment    1  Benign essential hypertension (401 1) (I10)   2  Type 2 diabetes mellitus without complication (101 75) (R45 1)   3  Nephrolithiasis (592 0) (N20 0)    Plan  Influenza vaccine needed    · Fluzone High-Dose 0 5 ML Intramuscular Suspension Prefilled Syringe   For: Influenza vaccine needed; Ordered By:Conchis Saunders; Effective Date:23Aug2016; Administered by: Luiz Ramos: 8/23/2016 11:14:00 AM; Last Updated By: Luiz Ramos; 8/23/2016 11:13:15 AM  Nephrolithiasis    · (1) BASIC METABOLIC PROFILE; Status:Active; Requested for:23Aug2016;    Perform:[facility]; PVN:[id number];  For:Nephrolithiasis; Ordered By:Conchis Saunders;   · (1) MICROALBUMIN CREATININE RATIO, RANDOM URINE; Status:Active; Requested  for:23Aug2016;    Perform:[facility]; YHW:[id number];  For:Nephrolithiasis; Ordered By:Conchis Saunders; Nephrolithiasis, Type 2 diabetes mellitus without complication    · (1) HEMOGLOBIN A1C; Status:Active; Requested for:23Aug2016;    Perform:[facility]; FHL:[id number];  For:Nephrolithiasis, Type 2 diabetes mellitus without complication; Ordered By:Conchis Saunders; Discussion/Summary  Discussion Summary:   Patient is here for a transition of care visit  His medications remain have been reconciled  He's should have diabetic lab work in about 2-3 weeks  He also needs an MRI that can be probably pushed off for about a month because he has a partial occlusion of the portal vein and we will see him again in about 6 weeks  History of Present Illness  TCM Communication [facility]: The patient is being contacted for follow-up after hospitalization and Called and left msg with QAMAR appt for 8/23/2016  He has no pending appts scheduled with our office at this time  Hospital Patient should get a BMP Monday 8/22/16  He was hospitalized at [facility] [place] and transferred to [facility]   The date of admission: 08/13/2016, date of discharge: 08/17/2016  Diagnosis: Right sided flank pain; Hydronephrosis with urinary obstruction D/C dx - Hydronephrosis with ureteral calculus; Right ureteral stone; Diabetes mellitus type 2 in obese; Essential HTN; Portal vein thrombosis; Chronic kidney disease  He was discharged to home  Medications were not reviewed today  He scheduled a follow up appointment  Counseling was provided to patient's family  Daughter Melita Malik who is thinking her brother is bringing patient to appt  Patient is hard of hearing and could not hear me on phone  Communication performed and completed by Elen Newell      Review of Systems  Complete-Male:   Constitutional: No fever or chills, feels well, no tiredness, no recent weight gain or weight loss  Eyes: No complaints of eye pain, no red eyes, no discharge from eyes, no itchy eyes  ENT: no complaints of earache, no hearing loss, no nosebleeds, no nasal discharge, no sore throat, no hoarseness  Cardiovascular: No complaints of slow heart rate, no fast heart rate, no chest pain, no palpitations, no leg claudication, no lower extremity  Respiratory: No complaints of shortness of breath, no wheezing, no cough, no SOB on exertion, no orthopnea or PND  Gastrointestinal: No complaints of abdominal pain, no constipation, no nausea or vomiting, no diarrhea or bloody stools  Genitourinary: No complaints of dysuria, no incontinence, no hesitancy, no nocturia, no genital lesion, no testicular pain  Musculoskeletal: No complaints of arthralgia, no myalgias, no joint swelling or stiffness, no limb pain or swelling  Integumentary: No complaints of skin rash or skin lesions, no itching, no skin wound, no dry skin  Neurological: No compliants of headache, no confusion, no convulsions, no numbness or tingling, no dizziness or fainting, no limb weakness, no difficulty walking     Psychiatric: Is not suicidal, no sleep disturbances, no anxiety or depression, no change in personality, no emotional problems  Endocrine: No complaints of proptosis, no hot flashes, no muscle weakness, no erectile dysfunction, no deepening of the voice, no feelings of weakness  Hematologic/Lymphatic: No complaints of swollen glands, no swollen glands in the neck, does not bleed easily, no easy bruising  ROS Reviewed:   ROS reviewed  Active Problems    1  Ambulatory dysfunction (719 7) (R26 2)   2  Benign essential hypertension (401 1) (I10)   3  Influenza vaccine needed (V04 81) (Z23)   4  Nephrolithiasis (592 0) (N20 0)   5  Type 2 diabetes mellitus without complication (475 24) (U05 6)    Surgical History    1  History of Appendectomy   2  History of Thyroid Surgery  Surgical History Reviewed: The surgical history was reviewed and updated today  Family History  Mother    1  Family history of cataracts (V19 19) (I71 536)  Father    2  Family history of Jaundice  Family History Reviewed: The family history was reviewed and updated today  Social History    · Denied: History of Alcohol use   · Denied: History of Drug use   · Never smoker  Social History Reviewed: The social history was reviewed and updated today  The social history was reviewed and is unchanged  Current Meds   1  Glimepiride 4 MG Oral Tablet; take 1 tablet twice a day; Therapy: 26CSI4503 to (Evaluate:93Fvm0994)  Requested for: 54KKC8347; Last   Rx:30Mar2016 Ordered   2  Januvia 100 MG Oral Tablet; take 1 tablet by mouth once daily; Therapy: 13EVK3745 to (Evaluate:55Pio8369)  Requested for: 12BVB1777; Last   Rx:30Mar2016 Ordered   3  Lisinopril 5 MG Oral Tablet; take 1 tablet by mouth twice a day; Therapy: 19SLA7917 to (Evaluate:90Yui9934)  Requested for: 61IYL6094; Last   Rx:30Mar2016 Ordered   4  Meloxicam 7 5 MG Oral Tablet; Take 1 tablet daily; Therapy: 68Jhe4475 to (Evaluate:18Oct2015)  Requested for: 59RFN8115; Last   Rx:70Eok7322 Ordered   5   MetFORMIN HCl - 500 MG Oral Tablet; take 1 tablet by mouth three times a day; Therapy: 67RJP0575 to (Evaluate:90Hbh7925)  Requested for: 27JTV6404; Last   Rx:30Mar2016 Ordered   6  Pravastatin Sodium 10 MG Oral Tablet; TAKE 1 TABLET DAILY AT BEDTIME; Therapy: 17OVL8738 to (Evaluate:29Slc6634)  Requested for: 13LSK0477; Last   Rx:23Mar2016 Ordered  Medication List Reviewed: The medication list was reviewed and updated today  Allergies    1  No Known Drug Allergies    Vitals  Signs   Recorded: 23SJH3710 54:26DX   Systolic: 757, LUE, Sitting  Diastolic: 70, LUE, Sitting  Height: 5 ft 9 in  Weight: 210 lb 3 04 oz  BMI Calculated: 31 04  BSA Calculated: 2 11    Physical Exam    Constitutional   General appearance: No acute distress, well appearing and well nourished  Eyes   Conjunctiva and lids: No swelling, erythema, or discharge  Pupils and irises: Equal, round and reactive to light  Ears, Nose, Mouth, and Throat   External inspection of ears and nose: Normal     Otoscopic examination: Tympanic membrance translucent with normal light reflex  Canals patent without erythema  Nasal mucosa, septum, and turbinates: Normal without edema or erythema  Oropharynx: Normal with no erythema, edema, exudate or lesions  Pulmonary   Respiratory effort: No increased work of breathing or signs of respiratory distress  Auscultation of lungs: Clear to auscultation, equal breath sounds bilaterally, no wheezes, no rales, no rhonci  Cardiovascular   Palpation of heart: Normal PMI, no thrills  Auscultation of heart: Normal rate and rhythm, normal S1 and S2, without murmurs  Examination of extremities for edema and/or varicosities: Normal     Carotid pulses: Normal     Abdomen   Abdomen: Non-tender, no masses  Liver and spleen: No hepatomegaly or splenomegaly  Lymphatic   Palpation of lymph nodes in neck: No lymphadenopathy  Musculoskeletal   Gait and station: Normal     Digits and nails: Normal without clubbing or cyanosis      Inspection/palpation of joints, bones, and muscles: Normal     Skin   Skin and subcutaneous tissue: Normal without rashes or lesions  Neurologic   Cranial nerves: Cranial nerves 2-12 intact  Reflexes: 2+ and symmetric  Sensation: No sensory loss  Psychiatric   Orientation to person, place and time: Normal     Mood and affect: Normal          Health Management  Health Maintenance   Medicare Annual Wellness Visit; every 1 year; Last 07CPI9800;  Next Due: 11Awj1574; Near Due    Signatures   Electronically signed by : Austin Ruffin, ; Aug 23 2016  9:10AM EST                       (Author)    Electronically signed by : Eric Koenig DO; Aug 23 2016 11:47AM EST                       (Author)

## 2018-01-11 NOTE — RESULT NOTES
Verified Results  (1) COMPREHENSIVE METABOLIC PANEL 21XLP4879 58:24OK Siena Payne    Order Number: [id number]     Test Name Result Flag Reference   SODIUM 138 mmol/L  136-145   POTASSIUM 4 6 mmol/L  3 5-5 3   CHLORIDE 107 mmol/L  100-108   CARBON DIOXIDE 23 mmol/L  21-32   ANION GAP (CALC) 8 mmol/L  4-13   BLOOD UREA NITROGEN 30 mg/dL H 5-25   CREATININE 1 40 mg/dL H 0 60-1 30   Standardized to IDMS reference method   CALCIUM 9 3 mg/dL  8 3-10 1   BILI, TOTAL 0 64 mg/dL  0 20-1 00   ALK PHOSPHATAS 60 U/L     ALT (SGPT) 22 U/L  12-78   AST(SGOT) 28 U/L  5-45   ALBUMIN 3 2 g/dL L 3 5-5 0   TOTAL PROTEIN 7 8 g/dL  6 4-8 2   eGFR 47 ml/min/1 73sq m     National Kidney Disease Education Program recommendations are as follows:  GFR calculation is accurate only with a steady state creatinine  Chronic Kidney disease less than 60 ml/min/1 73 sq  meters  Kidney failure less than 15 ml/min/1 73 sq  meters  GLUCOSE FASTING 149 mg/dL H 65-99     (1) HEMOGLOBIN A1C 93Rym3881 08:06AM Siena Payne    Order Number: [id number]     Test Name Result Flag Reference   HEMOGLOBIN A1C 9 5 % H 4 2-6 3   EST  AVG  GLUCOSE 226 mg/dl       (1) LIPID PANEL, FASTING 77Oev9052 08:06AM Siena Payne    Order Number: [id number]     Test Name Result Flag Reference   CHOLESTEROL 130 mg/dL     HDL,DIRECT 53 mg/dL  40-60   Specimen collection should occur prior to Metamizole administration due to the potential for falsely depressed results  LDL CHOLESTEROL CALCULATED 63 mg/dL  0-100   Triglyceride:         Normal              <150 mg/dl       Borderline High    150-199 mg/dl       High               200-499 mg/dl       Very High          >499 mg/dl  Cholesterol:         Desirable        <200 mg/dl      Borderline High  200-239 mg/dl      High             >239 mg/dl  HDL Cholesterol:        High    >59 mg/dL      Low     <41 mg/dL  LDL CALCULATED:    This screening LDL is a calculated result    It does not have the accuracy of the Direct Measured LDL in the monitoring of patients with hyperlipidemia and/or statin therapy  Direct Measure LDL (DKO680) must be ordered separately in these patients  TRIGLYCERIDES 71 mg/dL  <=150   Specimen collection should occur prior to N-Acetylcysteine or Metamizole administration due to the potential for falsely depressed results       (1) MICROALBUMIN CREATININE RATIO, RANDOM URINE 59Vpk7801 08:06AM [facility]    Order Number: [id number]     Test Name Result Flag Reference   MICROALBUMIN/ CREAT R <10 mg/g creatinine  0-30   MICROALBUMIN,URINE <5 0 mg/L  0 0-20 0   CREATININE URINE 51 5 mg/dL

## 2018-01-13 VITALS
SYSTOLIC BLOOD PRESSURE: 120 MMHG | HEIGHT: 69 IN | BODY MASS INDEX: 29.2 KG/M2 | WEIGHT: 197.13 LBS | DIASTOLIC BLOOD PRESSURE: 64 MMHG

## 2018-01-14 VITALS
SYSTOLIC BLOOD PRESSURE: 142 MMHG | BODY MASS INDEX: 30.18 KG/M2 | WEIGHT: 203.8 LBS | DIASTOLIC BLOOD PRESSURE: 74 MMHG | HEIGHT: 69 IN

## 2018-01-14 NOTE — RESULT NOTES
Verified Results  (1) CBC/PLT/DIFF 73Hsz9794 09:33AM Seabron Cranker Order Number: [id number]     Test Name Result Flag Reference   WBC COUNT 6 32 Thousand/uL  4 31-10 16   RBC COUNT 4 72 Million/uL  3 88-5 62   HEMOGLOBIN 12 3 g/dL  12 0-17 0   HEMATOCRIT 38 1 %  36 5-49 3   MCV 81 fL L 82-98   MCH 26 1 pg L 26 8-34 3   MCHC 32 3 g/dL  31 4-37 4   RDW 15 1 %  11 6-15 1   MPV 9 8 fL  8 9-12 7   PLATELET COUNT 108 Thousands/uL L 149-390   nRBC AUTOMATED 0 /100 WBCs     NEUTROPHILS RELATIVE PERCENT 60 %  43-75   LYMPHOCYTES RELATIVE PERCENT 30 %  14-44   MONOCYTES RELATIVE PERCENT 7 %  4-12   EOSINOPHILS RELATIVE PERCENT 2 %  0-6   BASOPHILS RELATIVE PERCENT 1 %  0-1   NEUTROPHILS ABSOLUTE COUNT 3 76 Thousands/?L  1 85-7 62   LYMPHOCYTES ABSOLUTE COUNT 1 91 Thousands/?L  0 60-4 47   MONOCYTES ABSOLUTE COUNT 0 46 Thousand/?L  0 17-1 22   EOSINOPHILS ABSOLUTE COUNT 0 15 Thousand/?L  0 00-0 61   BASOPHILS ABSOLUTE COUNT 0 03 Thousands/?L  0 00-0 10   - Patient Instructions: This bloodwork is non-fasting  Please drink two glasses of water morning of bloodwork  - Patient Instructions: This bloodwork is non-fasting  Please drink two glasses of water morning of bloodwork  (1) COMPREHENSIVE METABOLIC PANEL 41HDF0957 81:29VI Seabron Cranker Order Number: [id number]     Test Name Result Flag Reference   GLUCOSE,RANDM 132 mg/dL     If the patient is fasting, the ADA then defines impaired fasting glucose as > 100 mg/dL and diabetes as > or equal to 123 mg/dL     SODIUM 139 mmol/L  136-145   POTASSIUM 4 8 mmol/L  3 5-5 3   CHLORIDE 108 mmol/L  100-108   CARBON DIOXIDE 22 mmol/L  21-32   ANION GAP (CALC) 9 mmol/L  4-13   BLOOD UREA NITROGEN 24 mg/dL  5-25   CREATININE 1 31 mg/dL H 0 60-1 30   Standardized to IDMS reference method   CALCIUM 9 1 mg/dL  8 3-10 1   BILI, TOTAL 0 32 mg/dL  0 20-1 00   ALK PHOSPHATAS 59 U/L     ALT (SGPT) 29 U/L  12-78   AST(SGOT) 24 U/L  5-45   ALBUMIN 3 3 g/dL L 3 5-5 0   TOTAL PROTEIN 7 5 g/dL  6 4-8 2   eGFR Non-African American 52 6 ml/min/1 73sq m     - Patient Instructions: This is a fasting blood test  Water,black tea or black  coffee only after 9:00pm the night before test Drink 2 glasses of water the morning of test   National Kidney Disease Education Program recommendations are as follows:  GFR calculation is accurate only with a steady state creatinine  Chronic Kidney disease less than 60 ml/min/1 73 sq  meters  Kidney failure less than 15 ml/min/1 73 sq  meters  (1) LDL,DIRECT 13Sep2016 09:33AM Precilla Oppenheim Order Number: [id number]     Test Name Result Flag Reference   LDL, DIRECT 89 mg/dl  0-100   - Patient Instructions: This is a fasting blood test  Water,black tea or black  coffee only after 9:00pm the night before test   Drink 2 glasses of water the morning of test     - Patient Instructions: This is a fasting blood test  Water,black tea or black  coffee only after 9:00pm the night before test Drink 2 glasses of water the morning of test   LDL Cholesterol:        Optimal          <100 mg/dl        Near Optimal     100-129 mg/dl        Above Optimal          Borderline High   130-159 mg/dl          High              160-189 mg/dl          Very High        >189 mg/dl     (1) HEMOGLOBIN A1C 13Sep2016 09:33AM Precilla Oppenheim Order Number: [id number]     Test Name Result Flag Reference   HEMOGLOBIN A1C 8 5 % H 4 2-6 3   EST  AVG   GLUCOSE 197 mg/dl       (1) MICROALBUMIN CREATININE RATIO, RANDOM URINE 13Sep2016 09:33AM Precilla Oppenheim Order Number: [id number]     Test Name Result Flag Reference   MICROALBUMIN/ CREAT R 6 mg/g creatinine  0-30   MICROALBUMIN,URINE 7 4 mg/L  0 0-20 0   CREATININE URINE 114 0 mg/dL       (1) URINALYSIS (will reflex a microscopy if leukocytes, occult blood, protein or nitrites are not within normal limits) 13Sep2016 09:33AM Precilla Oppenheim Order Number: [id number]     Test Name Result Flag Reference   COLOR Yellow     CLARITY Cloudy     SPECIFIC GRAVITY UA 1 021  1 003-1 030   PH UA 5 5  4 5-8 0   LEUKOCYTE ESTERASE UA Small A Negative   NITRITE UA Negative  Negative   PROTEIN UA Negative mg/dl  Negative   GLUCOSE  (1/10%) mg/dl A Negative   KETONES UA Negative mg/dl  Negative   UROBILINOGEN UA 0 2 E U /dl  0 2, 1 0 E U /dl   BILIRUBIN UA Negative  Negative   BLOOD UA Negative  Negative   BACTERIA None Seen /hpf  None Seen, Occasional   EPITHELIAL CELLS None Seen /hpf  None Seen, Occasional   RBC UA None Seen /hpf  None Seen   WBC UA 2-4 /hpf A None Seen     (1) PSA (SCREEN) (Dx V76 44 Screen for Prostate Cancer) 01But3980 09:33AM Kathrin Hammans Order Number: [id number]     Test Name Result Flag Reference   PROSTATE SPECIFIC ANTIGEN 0 1 ng/mL  0 0-4 0   - Patient Instructions: This test is non-fasting  Please drink two glasses of water morning of bloodwork  - Patient Instructions: This test is non-fasting  Please drink two glasses of water morning of bloodwork  (1) VITAMIN B12 70Cmt3322 09:33AM Kathrin Hammans Order Number: [id number]     Test Name Result Flag Reference   VITAMIN B12 256 pg/mL  100-900   - Patient Instructions: This is a fasting blood test  Water,black tea or black  coffee only after 9:00pm the night before test Drink 2 glasses of water the morning of test      (1) FOLATE 44Lnh5474 09:33AM Kathrin Hammans Order Number: [id number]     Test Name Result Flag Reference   FOLATE 15 5 ng/mL  3 1-17 5   - Patient Instructions:  This is a fasting blood test  Water,black tea or black  coffee only after 9:00pm the night before test Drink 2 glasses of water the morning of test

## 2018-01-14 NOTE — RESULT NOTES
Verified Results  (1) VITAMIN B6 07Fnb7518 09:33AM Brent [place] Order Number: [id number]     Test Name Result Flag Reference   VITAMIN B6 5 0 ug/L L 5 3 - 46 7   Performed at:  [address]  [id number]  : Ignacio Vyas MD, Phone:  [phone]

## 2018-01-22 VITALS
WEIGHT: 193.2 LBS | BODY MASS INDEX: 28.61 KG/M2 | HEIGHT: 69 IN | SYSTOLIC BLOOD PRESSURE: 128 MMHG | DIASTOLIC BLOOD PRESSURE: 58 MMHG

## 2018-01-23 NOTE — MISCELLANEOUS
Assessment   1  Never smoker1   2  No advance directives (V49 89) (Z78 9)1   3  Ambulatory dysfunction (719 7) (R26 2)1   4  Benign essential hypertension (401 1) (I10)1   5  Controlled type 2 diabetes mellitus with microalbuminuria, with long-term current use of   insulin (250 40,791 0,V58 67) (E11 29,R80 9,Z79 4)1      1 Amended By: Johnathan Silva; Jul 28 2017 10:21 AM EST    Plan  Controlled type 2 diabetes mellitus with microalbuminuria, with long-term current use of  insulin    · (1) COMPREHENSIVE METABOLIC PANEL; Status:Active; Requested for:22Iad2401; 1   Perform:[facility]; Due:60Qwu5695; Ordered; For:Controlled type 2 diabetes   mellitus with microalbuminuria, with long-term current use of insulin; Ordered   By:Jeff Saunders    · (1) HEMOGLOBIN A1C; Status:Active; Requested for:87Syq1267; 1   Perform:[facility]; Due:44Xlu3692; Ordered; For:Controlled type 2 diabetes   mellitus with microalbuminuria, with long-term current use of insulin; Ordered   By:Jeff Saunders    · (1) LIPID PANEL, FASTING; Status:Active; Requested for:00Mos4816; 1   Perform:[facility]; Due:83Myp0495; Ordered; For:Controlled type 2 diabetes   mellitus with microalbuminuria, with long-term current use of insulin; Ordered   By:Jeff Saunders    · (1) MICROALBUMIN CREATININE RATIO, RANDOM URINE; Status:Active; Requested  for:86Rus1825; 1   Perform:[facility]; Due:69Qnc8443; Ordered; For:Controlled type 2 diabetes   mellitus with microalbuminuria, with long-term current use of insulin; Ordered   By:Jeff Saunders      1 Amended By: Johnathan Silva; Jul 28 2017 10:21 AM EST    Discussion/Summary  Discussion Summary:   Patient needs a baseline diabetic lab work  Also he requests a scooter so that he can perform activities of toileting and cooking in the house go out of doors  Patient will start looking at different types of scooters and provide us with appropriate paperwork   He will be seeing Chana saw iraida in August for management of his diabetes1        1 Amended By: Steffi Cotton; Jul 28 2017 10:22 AM EST    History of Present Illness  TCM Communication [facility]: The patient is being contacted for follow-up after hospitalization and Patient is scheduled for [facility] appt 7/28/17 at 9:30 am  He has no pending appts scheduled with our office at this time  Hospital records were not available and from [facility]  He was hospitalized at [facility]  The date of admission: 06/11/2017, date of discharge: 06/16/2017, Patient was then transferred to [place] [address] from 6/16/17 to 7/23/17  Diagnosis: Hyperglycemic coma; Uncontrolled type 2 DM with hyperosmolar nonketotic hyperglycemia D/C dx - Right ureteral stone; Hydronephrosis with urinary obstruction due to ureteral calculus; Diabetes mellitus type 2 in obese; Essential HTN; Lactic acidosis; Portal vein thrombosis; CKD; Hyperglycemia due to type 2 diabetes mellitus; Ambulatory dysfunction; Dementia; Hyponatremia; Non compliance with medication regimen  He was discharged to home, with home health services  He scheduled a follow up appointment  The patient is currently asymptomatic  Communication performed and completed by Levar Aguilera      Review of Systems  Complete-Male:   Constitutional:1  No fever or chills, feels well, no tiredness, no recent weight gain or weight loss1   Eyes:1  No complaints of eye pain, no red eyes, no discharge from eyes, no itchy eyes1   ENT:1  no complaints of earache, no hearing loss, no nosebleeds, no nasal discharge, no sore throat, no hoarseness1   Cardiovascular: 1  No complaints of slow heart rate, no fast heart rate, no chest pain, no palpitations, no leg claudication, no lower extremity1   Respiratory:1  No complaints of shortness of breath, no wheezing, no cough, no SOB on exertion, no orthopnea or PND1      Gastrointestinal:1  No complaints of abdominal pain, no constipation, no nausea or vomiting, no diarrhea or bloody stools1   Genitourinary:1  No complaints of dysuria, no incontinence, no hesitancy, no nocturia, no genital lesion, no testicular pain1   Musculoskeletal:1  Patient ambulates with a 4 wheeled walker great difficulty for him ambulating but he said he is motivated to do so I would say his limited for ambulation is about  feet and then he must rest1   Integumentary:1  No complaints of skin rash or skin lesions, no itching, no skin wound, no dry skin1   Neurological:1  limb weakness1  and difficulty walking1   Psychiatric:1  Is not suicidal, no sleep disturbances, no anxiety or depression, no change in personality, no emotional problems1   Endocrine:1  muscle weakness1  and Patient is insulin-dependent type 2 diabetic1   Hematologic/Lymphatic:1  No complaints of swollen glands, no swollen glands in the neck, does not bleed easily, no easy bruising1   ROS Reviewed:   ROS reviewed  1        1 Amended By: Toni Diaz; Jul 28 2017 10:16 AM EST    Active Problems   1  Ambulatory dysfunction (719 7) (R26 2)  2  Benign essential hypertension (401 1) (I10)  3  Dementia without behavioral disturbance (294 20) (F03 90)  4  Diabetes mellitus (250 00) (E11 9)  5  Encounter for prostate cancer screening (V76 44) (Z12 5)  6  Influenza vaccine needed (V04 81) (Z23)  7  Need for pneumococcal vaccination (V03 82) (Z23)  8  Nephrolithiasis (592 0) (N20 0)  9  Type 2 diabetes mellitus without complication (229 38) (P10 8)  10  Vitamin B12 deficiency (266 2) (E53 8)    Surgical History   1  History of Appendectomy  2  History of Thyroid Surgery  Surgical History Reviewed: The surgical history was reviewed and updated today  1        1 Amended By: Toni Diaz; Jul 28 2017 10:16 AM EST    Family History  Mother   1  Family history of cataracts (V19 19) (C13 394)  Father   2  Family history of Jaundice  Family History Reviewed: The family history was reviewed and updated today   1 1 Amended By: Casey Villatoro; Jul 28 2017 10:16 AM EST    Social History    · Denied: History of Alcohol use   · Denied: History of Drug use   · Never smoker   · No living will    Social History Reviewed: The social history was reviewed and updated today  1  The social history was reviewed and is unchanged  1        1 Amended By: Casey Villatoro; Jul 28 2017 10:16 AM EST    Current Meds  1  [employer] 2 Test In [employer]; test bid; Therapy: 89Nkz3870 to (Last Rx:80Ngc9512)  Requested for: 36Zju0436 Ordered  2  Laxmi Contour Test In Vitro Strip; USE TWICE DAILY TO CHECK BLOOD SUGAR  DX:   E11 9; Therapy: 80MQX8623 to (Last Rx:01Fab1620)  Requested for: 79Car9991 Ordered  3  Donepezil HCl - 10 MG Oral Tablet; TAKE 1 TABLET Bedtime; Therapy: 81Iva5523 to (Evaluate:52Bvi1738)  Requested for: 10AFL6148; Last   Rx:40Zoc7538 Ordered  4  Glimepiride 4 MG Oral Tablet; TAKE 1 TABLET TWICE DAILY; Therapy: 21NXT5819 to (Evaluate:27Mar2017)  Requested for: 95FML8991; Last   Rx:88Ucx2793 Ordered  5  Januvia 100 MG Oral Tablet; TAKE 1 TABLET DAILY; Therapy: 28QBR7391 to (Evaluate:27Mar2017)  Requested for: 01XFM8914; Last   Rx:43Ltn2350 Ordered  6  Lisinopril 5 MG Oral Tablet; Take 1 tablet twice daily; Therapy: 76ZJA9314 to (Evaluate:27Mar2017)  Requested for: 65VCV2137; Last   Rx:41Jpf0448 Ordered  7  Meloxicam 7 5 MG Oral Tablet; Take 1 tablet daily; Therapy: 13Clj8333 to (Evaluate:18Oct2015)  Requested for: 27TMO9174; Last   Rx:91Aum3391 Ordered  8  MetFORMIN HCl - 500 MG Oral Tablet; TAKE 1 TABLET 3 times daily; Therapy: 14PNS4585 to (Evaluate:27Mar2017)  Requested for: 70TEK1003; Last   Rx:98Tde4491 Ordered  9  Pravastatin Sodium 10 MG Oral Tablet; Take 1 tablet by mouth at bedtime; Therapy: 74XPB5649 to (Audrey Meals)  Requested for: 81GFO3810; Last   Rx:28Sep2016 Ordered  10  Trulicity 1 5 PG/5 3BM Subcutaneous Solution Pen-injector; Inject weekly;     Therapy: 86KVF5100 to (Last Rx:17Apr2017) Requested for: 79Nnh2564 Ordered  11  Vitamin B-12 2500 MCG Sublingual Tablet Sublingual; PLACE 1 TABLET Daily; Therapy: 36Ahu3002 to (Last Rx:33Spi8738)  Requested for: 28Tqt2845 Ordered  Medication List Reviewed: The medication list was reviewed and updated today  1        1 Amended By: Jennifer Tadeo; Jul 28 2017 10:16 AM EST    Allergies   1  No Known Drug Allergies    Vitals  Signs   Recorded: 19Ybd0327 08:83WM    Systolic: 188 1    Diastolic: 58 1    Height: 5 ft 9 in 1    Weight: 193 lb 3 2 oz 1    BMI Calculated: 28 53 1    BSA Calculated: 2 04 1      1 Amended By: Jennifer Tadeo; Jul 28 2017 10:16 AM EST    Physical Exam    Constitutional1    General appearance: No acute distress, well appearing and well nourished  1    Eyes1    Conjunctiva and lids: No swelling, erythema, or discharge  1    Pupils and irises: Equal, round and reactive to light  1    Ears, Nose, Mouth, and Throat1    External inspection of ears and nose: Normal 1    Otoscopic examination: Tympanic membrance translucent with normal light reflex  Canals patent without erythema  1    Nasal mucosa, septum, and turbinates: Normal without edema or erythema  1    Oropharynx: Normal with no erythema, edema, exudate or lesions  1    Pulmonary1    Respiratory effort: No increased work of breathing or signs of respiratory distress  1    Auscultation of lungs: Clear to auscultation, equal breath sounds bilaterally, no wheezes, no rales, no rhonci  1    Cardiovascular1    Palpation of heart: Normal PMI, no thrills  1    Auscultation of heart: Normal rate and rhythm, normal S1 and S2, without murmurs  1    Examination of extremities for edema and/or varicosities: Normal 1    Carotid pulses: Normal 1    Abdomen1    Abdomen: Non-tender, no masses  1    Liver and spleen: No hepatomegaly or splenomegaly  1    Lymphatic1    Palpation of lymph nodes in neck: No lymphadenopathy  1    Musculoskeletal1    Gait and station: Abnormal  1  Ambulates with a 4 wheeled walker with brakes and seat can probably walk about  feet and then he must sit down to rest1   Digits and nails: Normal without clubbing or cyanosis  1    Inspection/palpation of joints, bones, and muscles: Abnormal  1  Atrophy of muscles1   Skin1    Skin and subcutaneous tissue: Normal without rashes or lesions  1    Neurologic1    Cranial nerves: Cranial nerves 2-12 intact  1    Reflexes: 2+ and symmetric  1    Sensation: No sensory loss  1    Psychiatric1    Orientation to person, place and time: Normal 1    Mood and affect: Normal 1          1 Amended By: Elvin Herrera; Jul 28 2017 10:17 AM EST    Health Management  Health Maintenance   Medicare Annual Wellness Visit; every 1 year; Last 56IKZ9031; Next Due: 83Xox2609;  Overdue    Future Appointments    Date/Time Provider Specialty Site   07/28/2017 09:30 AM Elvin Herrera DO Family Medicine [address] [place] [address]   08/28/2017 01:15 PM Ginger Hodges, [address] [place] [address]     Signatures   Electronically signed by : Melchor Longo, ; Jul 25 2017  2:13PM EST                       (Author)    Electronically signed by : Mone Sethi DO; Jul 25 2017  6:17PM EST                       (Author)    Electronically signed by : Mone Sethi DO; Jul 28 2017 10:23AM EST                       (Author)

## 2018-01-23 NOTE — MISCELLANEOUS
Chief Complaint  Chief Complaint Free Text Note Form: 1/4/17 Update Patient was called x 2 with no return call back  There will not be a QAMAR done on this patient  TFP/cfm      History of Present Illness  TCM Communication  Luke: He was hospitalized at [facility] 11/1/17 to 11/4/17 and transferred to [facility] from 11/4/17 to 12/19/17  The date of admission: 11/1/17, date of discharge: 11/4/17, Patient was then transferred to [facility] from 11/4/17 to 12/19/17  Diagnosis: Facial injury; Outbursts of explosive behavior; Closed head injury, initial encounter; Abrasion of face, initial encounter D/C dx: Essential HTN; Hyperglycemia due to type 2 diabetes mellitus; Ambulatory dysfunction; Dementia; CKD stage 3, Age-related cognitive decline; Pancytopenia; Gait instability; Vitamin D deficiency; Abrasion of face,  He was discharged to home  Medications were not reviewed today  Communication performed and completed by Lázaro Ramirez   HPI: 81year old gentleman who presented to the hospital following a fall  He was cleared from a trauma standpoint, but has known ambulatory dysfunction, a head injury, and found to have marked hyperglycemia so was admitted for further workup and evaluation  Active Problems    1  Ambulatory dysfunction (719 7) (R26 2)   2  Benign essential hypertension (401 1) (I10)   3  Chronic kidney disease, stage 3 (585 3) (N18 3)   4  Controlled type 2 diabetes mellitus with microalbuminuria, with long-term current use of   insulin (250 40,791 0,V58 67) (E11 29,R80 9,Z79 4)   5  Dementia without behavioral disturbance (294 20) (F03 90)   6  Depression screen (V79 0) (Z13 89)   7  Diabetes mellitus (250 00) (E11 9)   8  Diabetic peripheral neuropathy (250 60,357 2) (E11 42)   9  Exercise counseling (V65 41) (Z71 82)   10  Flu vaccine need (V04 81) (Z23)   11  Nephrolithiasis (592 0) (N20 0)   12  History of No advance directives (V49 89) (Z78 9)   13   Screening for diabetic peripheral neuropathy (V80 09) (Z13 89)   14  Screening for diabetic retinopathy (V80 2) (Z13 5)   15  Screening for genitourinary condition (V81 6) (Z13 89)   16  Screening for neurological condition (V80 09) (Z13 89)   17  Vitamin B12 deficiency (266 2) (E53 8)    Past Medical History    1  History of Encounter for prostate cancer screening (V76 44) (Z12 5)   2  History of influenza vaccination (V49 89) (Z92 29)   3  History of pneumococcal vaccination (V49 89) (Z92 29)    Surgical History    1  History of Appendectomy   2  History of Thyroid Surgery    Family History  Mother    1  Family history of cataracts (V19 19) (R43 264)  Father    2  Family history of Jaundice    Social History    · Denied: History of Alcohol use   · Denied: History of Drug use   · Never smoker   · History of No advance directives (V49 89) (Z78 9)   · No living will    Current Meds   1  Alcohol Prep Pad; USE AS DIRECTED; Therapy: 87Rai5503 to (Last Rx:28Krj7779)  Requested for: 54Xwj8090 Ordered   2  Basaglar KwikPen 100 UNIT/ML Subcutaneous Solution Pen-injector; INJECT 30 UNIT   Daily; Therapy: 67Qdc3952 to (Last Rx:30Ftf1293)  Requested for: 58Ube4533 Ordered   3  [employer] 2 Test In Nutricate; Checking 4-5 times per day; Therapy: 37Ykn0780 to (Last Rx:30Oct2017)  Requested for: 94Xfl1199 Ordered   4  Laxmi Microlet Lancets Miscellaneous; testing blood 4-5 times per day; Therapy: 28Yfy1361 to (Last Rx:49Ust0551)  Requested for: 26Zpm8867 Ordered   5  BD Insulin Syringe Ultrafine 31G X 15/64" 0 3 ML Miscellaneous; 4 syringes daily to inject   Humalog and Lantus; Therapy: 30MCQ5035 to (Last Rx:94Pok6034)  Requested for: 48Hud3401 Ordered   6  BD Pen Needle Short U/F 31G X 8 MM Miscellaneous; USE AS DIRECTED ONE DAILY; Therapy: 11Vqy1202 to (Last Glorious Graft)  Requested for: 76Hwg2418 Ordered   7  Donepezil HCl - 5 MG Oral Tablet; take one tablet at bedtime;    Therapy: 34Nip7438 to (Evaluate:09Tew1647)  Requested for: 02Bwu5923; Last   Rx:03Kgw3450 Ordered   8  Gabapentin 100 MG Oral Capsule; TAKE 1 CAPSULE Bedtime; Therapy: 98Xbp7244 to (Complete:10Jan2018)  Requested for: 55Spf4056; Last   Rx:86Acf5146 Ordered   9  HumaLOG 100 UNIT/ML Subcutaneous Solution; INJECT 6 UNIT Daily with meals or   sliding scale dependent on sugar results  Requested for: 53Nzd9658; Last   Rx:92Ptv0242 Ordered   10  Lisinopril 5 MG Oral Tablet; Take 1 tablet twice daily; Therapy: 14YCU9180 to (Edin Calderon)  Requested for: 56Bvk2122; Last    Rx:75Hnq7800 Ordered   11  Pravastatin Sodium 10 MG Oral Tablet; Take 1 tablet by mouth at bedtime; Therapy: 15HTX1020 to (Edin Calderon)  Requested for: 40Fez0936; Last    Rx:31Gum5146 Ordered    Allergies    1  No Known Drug Allergies    Health Management  Health Maintenance   Medicare Annual Wellness Visit; every 1 year; Last 44FPD4183; Next Due: 07Eks2843;  Overdue    Future Appointments    Date/Time Provider Specialty Site   01/17/2018 09:00 AM Andressa Guerra DO Family Medicine [address] [facility]     Signatures   Electronically signed by : Juan Espana, ; Jan 4 2018 11:05AM EST                       (Author)    Electronically signed by : Michelle Daly DO; Jan 8 2018  1:59PM EST                       (Author)

## 2018-02-02 ENCOUNTER — HOSPITAL ENCOUNTER (EMERGENCY)
Facility: HOSPITAL | Age: 83
Discharge: HOME | End: 2018-02-02
Attending: EMERGENCY MEDICINE | Admitting: EMERGENCY MEDICINE
Payer: MEDICARE

## 2018-02-02 VITALS
HEART RATE: 72 BPM | TEMPERATURE: 97.5 F | OXYGEN SATURATION: 98 % | BODY MASS INDEX: 28.16 KG/M2 | DIASTOLIC BLOOD PRESSURE: 59 MMHG | SYSTOLIC BLOOD PRESSURE: 137 MMHG | RESPIRATION RATE: 21 BRPM | WEIGHT: 190.7 LBS

## 2018-02-02 DIAGNOSIS — N17.9 ACUTE KIDNEY INJURY (HCC): Primary | ICD-10-CM

## 2018-02-02 DIAGNOSIS — N39.0 UTI (URINARY TRACT INFECTION): ICD-10-CM

## 2018-02-02 LAB
ANION GAP SERPL CALCULATED.3IONS-SCNC: 11 MMOL/L (ref 4–13)
ATRIAL RATE: 66 BPM
BACTERIA UR QL AUTO: ABNORMAL /HPF
BASOPHILS # BLD AUTO: 0.02 THOUSANDS/ΜL (ref 0–0.1)
BASOPHILS NFR BLD AUTO: 0 % (ref 0–1)
BILIRUB UR QL STRIP: NEGATIVE
BUN SERPL-MCNC: 28 MG/DL (ref 5–25)
CALCIUM SERPL-MCNC: 8.9 MG/DL (ref 8.3–10.1)
CHLORIDE SERPL-SCNC: 97 MMOL/L (ref 100–108)
CLARITY UR: CLEAR
CO2 SERPL-SCNC: 22 MMOL/L (ref 21–32)
COLOR UR: YELLOW
CREAT SERPL-MCNC: 1.67 MG/DL (ref 0.6–1.3)
EOSINOPHIL # BLD AUTO: 0.15 THOUSAND/ΜL (ref 0–0.61)
EOSINOPHIL NFR BLD AUTO: 3 % (ref 0–6)
ERYTHROCYTE [DISTWIDTH] IN BLOOD BY AUTOMATED COUNT: 14.2 % (ref 11.6–15.1)
GFR SERPL CREATININE-BSD FRML MDRD: 38 ML/MIN/1.73SQ M
GLUCOSE SERPL-MCNC: 392 MG/DL (ref 65–140)
GLUCOSE UR STRIP-MCNC: ABNORMAL MG/DL
HCT VFR BLD AUTO: 37.9 % (ref 36.5–49.3)
HGB BLD-MCNC: 12.7 G/DL (ref 12–17)
HGB UR QL STRIP.AUTO: ABNORMAL
KETONES UR STRIP-MCNC: NEGATIVE MG/DL
LEUKOCYTE ESTERASE UR QL STRIP: ABNORMAL
LYMPHOCYTES # BLD AUTO: 0.85 THOUSANDS/ΜL (ref 0.6–4.47)
LYMPHOCYTES NFR BLD AUTO: 17 % (ref 14–44)
MCH RBC QN AUTO: 26 PG (ref 26.8–34.3)
MCHC RBC AUTO-ENTMCNC: 33.5 G/DL (ref 31.4–37.4)
MCV RBC AUTO: 78 FL (ref 82–98)
MONOCYTES # BLD AUTO: 0.39 THOUSAND/ΜL (ref 0.17–1.22)
MONOCYTES NFR BLD AUTO: 8 % (ref 4–12)
NEUTROPHILS # BLD AUTO: 3.5 THOUSANDS/ΜL (ref 1.85–7.62)
NEUTS SEG NFR BLD AUTO: 72 % (ref 43–75)
NITRITE UR QL STRIP: NEGATIVE
NON-SQ EPI CELLS URNS QL MICRO: ABNORMAL /HPF
P AXIS: 22 DEGREES
PH UR STRIP.AUTO: 5.5 [PH] (ref 4.5–8)
PLATELET # BLD AUTO: 97 THOUSANDS/UL (ref 149–390)
PMV BLD AUTO: 9.7 FL (ref 8.9–12.7)
POTASSIUM SERPL-SCNC: 5 MMOL/L (ref 3.5–5.3)
PR INTERVAL: 192 MS
PROT UR STRIP-MCNC: NEGATIVE MG/DL
QRS AXIS: 18 DEGREES
QRSD INTERVAL: 82 MS
QT INTERVAL: 426 MS
QTC INTERVAL: 446 MS
RBC # BLD AUTO: 4.88 MILLION/UL (ref 3.88–5.62)
RBC #/AREA URNS AUTO: ABNORMAL /HPF
SODIUM SERPL-SCNC: 130 MMOL/L (ref 136–145)
SP GR UR STRIP.AUTO: 1.02 (ref 1–1.03)
T WAVE AXIS: 58 DEGREES
TROPONIN I SERPL-MCNC: <0.02 NG/ML
UROBILINOGEN UR QL STRIP.AUTO: 0.2 E.U./DL
VENTRICULAR RATE: 66 BPM
WBC # BLD AUTO: 4.91 THOUSAND/UL (ref 4.31–10.16)
WBC #/AREA URNS AUTO: ABNORMAL /HPF

## 2018-02-02 PROCEDURE — 99284 EMERGENCY DEPT VISIT MOD MDM: CPT

## 2018-02-02 PROCEDURE — 36415 COLL VENOUS BLD VENIPUNCTURE: CPT | Performed by: EMERGENCY MEDICINE

## 2018-02-02 PROCEDURE — 93010 ELECTROCARDIOGRAM REPORT: CPT | Performed by: INTERNAL MEDICINE

## 2018-02-02 PROCEDURE — 84484 ASSAY OF TROPONIN QUANT: CPT | Performed by: EMERGENCY MEDICINE

## 2018-02-02 PROCEDURE — 96360 HYDRATION IV INFUSION INIT: CPT

## 2018-02-02 PROCEDURE — 96361 HYDRATE IV INFUSION ADD-ON: CPT

## 2018-02-02 PROCEDURE — 85025 COMPLETE CBC W/AUTO DIFF WBC: CPT | Performed by: EMERGENCY MEDICINE

## 2018-02-02 PROCEDURE — 80048 BASIC METABOLIC PNL TOTAL CA: CPT | Performed by: EMERGENCY MEDICINE

## 2018-02-02 PROCEDURE — 81001 URINALYSIS AUTO W/SCOPE: CPT | Performed by: EMERGENCY MEDICINE

## 2018-02-02 PROCEDURE — 93005 ELECTROCARDIOGRAM TRACING: CPT

## 2018-02-02 PROCEDURE — 82948 REAGENT STRIP/BLOOD GLUCOSE: CPT

## 2018-02-02 RX ORDER — PRAVASTATIN SODIUM 10 MG
1 TABLET ORAL
COMMUNITY
Start: 2016-03-23 | End: 2018-07-16 | Stop reason: HOSPADM

## 2018-02-02 RX ORDER — SULFAMETHOXAZOLE AND TRIMETHOPRIM 800; 160 MG/1; MG/1
1 TABLET ORAL 2 TIMES DAILY
Qty: 14 TABLET | Refills: 0 | Status: SHIPPED | OUTPATIENT
Start: 2018-02-02 | End: 2018-02-09

## 2018-02-02 RX ORDER — DONEPEZIL HYDROCHLORIDE 5 MG/1
1 TABLET, FILM COATED ORAL
COMMUNITY
Start: 2017-09-12

## 2018-02-02 RX ADMIN — SODIUM CHLORIDE 1000 ML: 0.9 INJECTION, SOLUTION INTRAVENOUS at 16:03

## 2018-02-02 RX ADMIN — SODIUM CHLORIDE 500 ML: 0.9 INJECTION, SOLUTION INTRAVENOUS at 15:03

## 2018-02-02 NOTE — ED PROCEDURE NOTE
PROCEDURE  ECG 12 Lead Documentation  Date/Time: 2/2/2018 2:27 PM  Performed by: Shelbie Whitman by: Kory Watts     Indications / Diagnosis:  Weakness  ECG reviewed by me, the ED Provider: yes    Patient location:  ED  Interpretation:     Interpretation: normal    Rate:     ECG rate:  66    ECG rate assessment: normal    Rhythm:     Rhythm: sinus rhythm    Ectopy:     Ectopy: none    QRS:     QRS axis:  Normal  Conduction:     Conduction: normal    ST segments:     ST segments:  Normal         Arsen Ochoa DO  02/02/18 1444

## 2018-02-02 NOTE — DISCHARGE INSTRUCTIONS
Acute Kidney Injury, Ambulatory Care   GENERAL INFORMATION:   Acute kidney injury  happens when your kidneys suddenly stop working correctly  Normally, the kidneys turn fluid, chemicals, and waste from your blood into urine  In acute kidney injury, your kidneys can no longer do this  In most cases, it is temporary, but it may become a chronic kidney condition  Common symptoms include the following:   · Decreased urination or dark-colored urine    · Swelling in your arms, legs, or feet     · Abdominal or low back pain    · Vomiting, diarrhea, or loss of appetite    · Fatigue     · Skin rash  Seek immediate care for the following symptoms:   · Heart beating faster than normal for you    · Sudden chest pain or trouble breathing    · Seizure  Treatment for acute kidney injury:  Treatment depends upon the cause of your acute kidney injury and how severe it is  Medicines may be given to increase blood flow to your kidneys and protect your kidneys  You may also need medicine to decrease inflammation in your kidneys  You may be given IV fluids to replenish fluids and help your heart pump blood  Dialysis may be needed to remove chemicals and waste from your blood when your kidneys cannot  Manage acute kidney injury:   · Manage other health conditions  Care for your diabetes, high blood pressure, or heart disease  These conditions increase your risk for acute kidney injury  · Talk to your healthcare provider before you take over-the-counter-medicine  NSAIDs, stomach medicine, or laxatives may harm your kidneys and increase your risk for acute kidney injury  Follow up with your healthcare provider as directed:  Write down your questions so you remember to ask them during your visits  CARE AGREEMENT:   You have the right to help plan your care  Learn about your health condition and how it may be treated  Discuss treatment options with your caregivers to decide what care you want to receive   You always have the right to refuse treatment  The above information is an  only  It is not intended as medical advice for individual conditions or treatments  Talk to your doctor, nurse or pharmacist before following any medical regimen to see if it is safe and effective for you  © 2014 [address] is for End User's use only and may not be sold, redistributed or otherwise used for commercial purposes  All illustrations and images included in CareNotes® are the copyrighted property of [employer]  or [employer]  Urinary Tract Infection in Men   WHAT YOU NEED TO KNOW:   A urinary tract infection (UTI) is caused by bacteria that get inside your urinary tract  Most bacteria that enter your urinary tract come out when you urinate  If the bacteria stay in your urinary tract, you may get an infection  Your urinary tract includes your kidneys, ureters, bladder, and urethra  Urine is made in your kidneys, and it flows from the ureters to the bladder  Urine leaves the bladder through the urethra  A UTI is more common in your lower urinary tract, which includes your bladder and urethra  DISCHARGE INSTRUCTIONS:   Return to the emergency department if:   · You are urinating very little or not at all  · You have a high fever with shaking chills  · You have side or back pain that gets worse  Contact your healthcare provider if:   · You have a mild fever  · You do not feel better after 2 days of taking antibiotics  · You are vomiting  · You have new symptoms, such as blood or pus in your urine  · You have questions or concerns about your condition or care  Medicines:   · Antibiotics  help fight a bacterial infection  · Medicines  may be given to decrease pain and burning when you urinate  They will also help decrease the feeling that you need to urinate often  These medicines will make your urine orange or red  · Take your medicine as directed    Contact your healthcare provider if you think your medicine is not helping or if you have side effects  Tell him of her if you are allergic to any medicine  Keep a list of the medicines, vitamins, and herbs you take  Include the amounts, and when and why you take them  Bring the list or the pill bottles to follow-up visits  Carry your medicine list with you in case of an emergency  Prevent another UTI:   · Empty your bladder often  Urinate and empty your bladder as soon as you feel the need  Do not hold your urine for long periods of time  · Drink liquids as directed  Ask how much liquid to drink each day and which liquids are best for you  You may need to drink more liquids than usual to help flush out the bacteria  Do not drink alcohol, caffeine, or citrus juices  These can irritate your bladder and increase your symptoms  Your healthcare provider may recommend cranberry juice to help prevent a UTI  · Urinate after you have sex  This can help flush out bacteria passed during sex  · Do pelvic muscle exercises often  Pelvic muscle exercises may help you start and stop urinating  Strong pelvic muscles may help you empty your bladder easier  Squeeze these muscles tightly for 5 seconds like you are trying to hold back urine  Then relax for 5 seconds  Gradually work up to squeezing for 10 seconds  Do 3 sets of 15 repetitions a day, or as directed  Follow up with your healthcare provider as directed:  Write down your questions so you remember to ask them during your visits  © 2017 [address] Information is for End User's use only and may not be sold, redistributed or otherwise used for commercial purposes  All illustrations and images included in CareNotes® are the copyrighted property of [employer]  or [employer]  The above information is an  only  It is not intended as medical advice for individual conditions or treatments   Talk to your doctor, nurse or pharmacist before following any medical regimen to see if it is safe and effective for you

## 2018-02-02 NOTE — ED PROVIDER NOTES
History  Chief Complaint   Patient presents with   Mayda December     felt weak and went down in son's backyard; unable to get up     Patient complains of generalized weakness causing him to kneel down and that lay down and be unable to get back up  He states he did not truly fall but rather kneeled down and then laid down  Immediately prior to this he stormed out of the house after an argument and walked several blocks farther than he usually walks  When he returned to the house he was too weak to continue and was found laying in the backyard  He was going to a total of 25 minutes and has no symptoms associated with exposure  He is generally a poor historian  He is an insulin-dependent diabetic but did not check his blood sugar at that time  He has known significant lower extremity neuropathy for which he takes gabapentin  No recent travel or sick contacts  He is hard of hearing  Denies f/c, HA, CP, SOB, abdominal pain, n/v/d  12 system ROS o/w negative  History provided by:  Patient, medical records and relative  Fall   Associated symptoms: no abdominal pain, no back pain, no chest pain, no headaches, no loss of consciousness, no nausea, no neck pain and no vomiting    Fatigue   Severity:  Mild  Onset quality:  Sudden  Duration:  15 minutes  Timing:  Constant  Progression:  Partially resolved  Chronicity:  New  Context: stress    Context: not change in medication and not decreased sleep    Relieved by:  Rest and lying down  Worsened by:   Activity  Associated symptoms: extremity numbness (Chronic, unchanged)    Associated symptoms: no abdominal pain, no anorexia, no arthralgias, no chest pain, no cough, no diarrhea, no dizziness, no dysuria, no fever, no frequency, no headaches, no lethargy, no loss of consciousness, no melena, no myalgias, no nausea, no near-syncope, no shortness of breath, no syncope, no urgency and no vomiting    Risk factors: diabetes and neurologic disease (Dementia) Prior to Admission Medications   Prescriptions Last Dose Informant Patient Reported? Taking? cholecalciferol 2000 units TABS   No No   Sig: Take 1 tablet by mouth daily   donepezil (ARICEPT) 5 mg tablet   Yes No   Sig: Take 5 mg by mouth daily at bedtime   donepezil (ARICEPT) 5 mg tablet   Yes Yes   Sig: Take 1 tablet by mouth   gabapentin (NEURONTIN) 100 mg capsule   Yes No   Sig: Take 100 mg by mouth daily at bedtime   insulin glargine (LANTUS) 100 units/mL subcutaneous injection   No No   Sig: Inject 36 Units under the skin every morning   insulin lispro (HUMALOG) 100 units/mL injection   Yes No   Sig: Inject 6 Units under the skin Daily   insulin lispro (HumaLOG) 100 units/mL injection   No No   Sig: Inject 6 Units under the skin 3 (three) times a day with meals   insulin lispro (HumaLOG) 100 units/mL injection   No No   Sig: Inject 1-6 Units under the skin 3 (three) times a day before meals   lisinopril (ZESTRIL) 5 mg tablet   Yes No   Sig: Take 5 mg by mouth 2 (two) times a day  pravastatin (PRAVACHOL) 10 mg tablet   Yes No   Sig: Take 10 mg by mouth daily at bedtime  pravastatin (PRAVACHOL) 10 mg tablet   Yes Yes   Sig: Take 1 tablet by mouth      Facility-Administered Medications: None       Past Medical History:   Diagnosis Date    Age-related cognitive decline     CKD (chronic kidney disease) stage 3, GFR 30-59 ml/min 08/16/2016    Dementia     Diabetes mellitus ([facility] [address] )     Hearing loss     History of kidney stones     Hypertension     Thrombocytopenia (HCC)        Past Surgical History:   Procedure Laterality Date    APPENDECTOMY      CYSTOSCOPY Right 8/14/2016    Procedure: CYSTOSCOPY, right uretereoscopy,, stone extraction, stent ;  Surgeon: Alirio Terrell MD;  [facility];  Service:     NEPHRECTOMY      THYROIDECTOMY         History reviewed  No pertinent family history  I have reviewed and agree with the history as documented      Social History   Substance Use Topics  Smoking status: Former Smoker     Quit date: 1953    Smokeless tobacco: Never Used    Alcohol use No      Comment: patient denies drinking alcohol        Review of Systems   Constitutional: Positive for fatigue  Negative for chills, diaphoresis and fever  HENT: Negative for rhinorrhea, sore throat and trouble swallowing  Respiratory: Negative for cough, chest tightness, shortness of breath and wheezing  Cardiovascular: Negative for chest pain, palpitations, leg swelling, syncope and near-syncope  Gastrointestinal: Negative for abdominal distention, abdominal pain, anorexia, constipation, diarrhea, melena, nausea and vomiting  Genitourinary: Negative for difficulty urinating, dysuria, flank pain, frequency, hematuria and urgency  Musculoskeletal: Negative for arthralgias, back pain, myalgias, neck pain and neck stiffness  Skin: Negative for pallor and rash  Neurological: Negative for dizziness, loss of consciousness, weakness, light-headedness and headaches  Hematological: Negative for adenopathy  Psychiatric/Behavioral: Negative for confusion  The patient is not nervous/anxious  All other systems reviewed and are negative  Physical Exam  ED Triage Vitals [02/02/18 1418]   Temperature Pulse Respirations Blood Pressure SpO2   97 5 °F (36 4 °C) 67 17 143/69 98 %      Temp Source Heart Rate Source Patient Position - Orthostatic VS BP Location FiO2 (%)   Temporal Monitor -- Right arm --      Pain Score       No Pain           Orthostatic Vital Signs  Vitals:    02/02/18 1418 02/02/18 1530 02/02/18 1600   BP: 143/69 156/70 147/76   Pulse: 67 57 65       Physical Exam   Constitutional: He is oriented to person, place, and time  He appears well-developed and well-nourished  No distress  HENT:   Head: Normocephalic  Mouth/Throat: Oropharynx is clear and moist    Eyes: Conjunctivae and EOM are normal  Pupils are equal, round, and reactive to light  Neck: Normal range of motion   Neck supple  Cardiovascular: Normal rate and regular rhythm  No murmur heard  Pulmonary/Chest: Effort normal and breath sounds normal    Abdominal: Soft  Bowel sounds are normal  He exhibits no distension  There is no tenderness  Musculoskeletal: Normal range of motion  He exhibits no edema or tenderness  Lymphadenopathy:     He has no cervical adenopathy  Neurological: He is alert and oriented to person, place, and time  He has normal reflexes  No cranial nerve deficit or sensory deficit  He exhibits normal muscle tone  Skin: Skin is warm and dry  No rash noted  He is not diaphoretic  No erythema  No pallor  Psychiatric: He has a normal mood and affect  His behavior is normal  Thought content normal    Vitals reviewed  ED Medications  Medications   sodium chloride 0 9 % bolus 500 mL (0 mL Intravenous Stopped 2/2/18 1545)   sodium chloride 0 9 % bolus 1,000 mL (1,000 mL Intravenous New Bag 2/2/18 1603)       Diagnostic Studies  Results Reviewed     Procedure Component Value Units Date/Time    Urine Microscopic [[id number]]  (Abnormal) Collected:  02/02/18 1700    Lab Status:  Final result Specimen:  Urine from Urine, Clean Catch Updated:  02/02/18 1732     RBC, UA 0-1 (A) /hpf      WBC, UA None Seen /hpf      Epithelial Cells Occasional /hpf      Bacteria, UA Occasional /hpf     UA w Reflex to Microscopic w Reflex to Culture [[id number]]  (Abnormal) Collected:  02/02/18 1700    Lab Status:  Final result Specimen:  Urine from Urine, Clean Catch Updated:  02/02/18 1718     Color, UA Yellow     Clarity, UA Clear     Specific [place], UA 1 020     pH, UA 5 5     Leukocytes, UA Elevated glucose may cause decreased leukocyte values   See urine microscopic for [facility] result/ (A)     Nitrite, UA Negative     Protein, UA Negative mg/dl      Glucose, UA >=1000 (1%) (A) mg/dl      Ketones, UA Negative mg/dl      Urobilinogen, UA 0 2 E U /dl      Bilirubin, UA Negative     Blood, UA Trace-Intact    Troponin I [[id number]]  (Normal) Collected:  02/02/18 1500    Lab Status:  Final result Specimen:  Blood from Arm, Left Updated:  02/02/18 1530     Troponin I <0 02 ng/mL     Narrative:         [employer] Chemistry analyzer 99% cutoff is > 0 04 ng/mL in network labs    o cTnI 99% cutoff is useful only when applied to patients in the clinical setting of myocardial ischemia  o cTnI 99% cutoff should be interpreted in the context of clinical history, ECG findings and possibly cardiac imaging to establish correct diagnosis  o cTnI 99% cutoff may be suggestive but clearly not indicative of a coronary event without the clinical setting of myocardial ischemia  Basic metabolic panel [[id number]]  (Abnormal) Collected:  02/02/18 1500    Lab Status:  Final result Specimen:  Blood from Arm, Left Updated:  02/02/18 1524     Sodium 130 (L) mmol/L      Potassium 5 0 mmol/L      Chloride 97 (L) mmol/L      CO2 22 mmol/L      Anion Gap 11 mmol/L      BUN 28 (H) mg/dL      Creatinine 1 67 (H) mg/dL      Glucose 392 (H) mg/dL      Calcium 8 9 mg/dL      eGFR 38 ml/min/1 73sq m     Narrative:         National Kidney Disease Education Program recommendations are as follows:  GFR calculation is accurate only with a steady state creatinine  Chronic Kidney disease less than 60 ml/min/1 73 sq  meters  Kidney failure less than 15 ml/min/1 73 sq  meters      CBC and differential [[id number]]  (Abnormal) Collected:  02/02/18 1500    Lab Status:  Final result Specimen:  Blood from Arm, Left Updated:  02/02/18 1513     WBC 4 91 Thousand/uL      RBC 4 88 Million/uL      Hemoglobin 12 7 g/dL      Hematocrit 37 9 %      MCV 78 (L) fL      MCH 26 0 (L) pg      MCHC 33 5 g/dL      RDW 14 2 %      MPV 9 7 fL      Platelets 97 (L) Thousands/uL      Neutrophils Relative 72 %      Lymphocytes Relative 17 %      Monocytes Relative 8 %      Eosinophils Relative 3 %      Basophils Relative 0 %      Neutrophils Absolute 3 50 Thousands/µL      Lymphocytes Absolute 0 85 Thousands/µL      Monocytes Absolute 0 39 Thousand/µL      Eosinophils Absolute 0 15 Thousand/µL      Basophils Absolute 0 02 Thousands/µL                  No orders to display              Procedures  Procedures       Phone Contacts  ED Phone Contact    ED Course  ED Course as of Feb 02 1824 Fri Feb 02, 2018   1524 Baseline 1 29-1 44 in the last few months  Will hydrate  Creatinine: (!) 1 67   1525 Baseline 1 35-1 38 in the past few months  Will hydrate  Sodium: (!) 130   1728 Results reviewed with patient  Feels better  He has kept his arm bent and has prevented the IV fluids from infusing  Patient advised to keep his arm straight  Recommend supportive treatment at home and follow up with PCP  MDM  Number of Diagnoses or Management Options  Diagnosis management comments: DDx:  Weakness - fatigue/exertion, hypoglycemia, abnormal electrolytes, acute renal failure, anemia, occult infection, doubt ACS/MI, stroke  No report of trauma or syncope  A/P: Will check cardiac workup, treat symptoms, reevaluate for further work up and disposition         Amount and/or Complexity of Data Reviewed  Clinical lab tests: reviewed and ordered  Tests in the medicine section of CPT®: reviewed  Decide to obtain previous medical records or to obtain history from someone other than the patient: yes  Obtain history from someone other than the patient: yes (Son  )  Review and summarize past medical records: yes      CritCare Time    Disposition  Final diagnoses:   Acute kidney injury ([facility] [address] )   UTI (urinary tract infection)     Time reflects when diagnosis was documented in both MDM as applicable and the Disposition within this note     Time User Action Codes Description Comment    2/2/2018  5:29 PM Michelle Calabrese, [address] [[address] 9] Acute kidney injury ([address] )     2/2/2018  5:29 PM Michelle Calabrese [address] [[address]39 0] UTI (urinary tract infection)       ED Disposition     ED Disposition Condition Comment    Discharge Liam Clarington discharge to home/self care  Condition at discharge: Stable        Follow-up Information     Follow up With Specialties Details Why Contact Info    Yasmany Jacobson DO Family Medicine Schedule an appointment as soon as possible for a visit If symptoms worsen [address] [facility] [address]  [facility] [address]  [phone]          Patient's Medications   Discharge Prescriptions    SULFAMETHOXAZOLE-TRIMETHOPRIM (BACTRIM DS) 800-160 MG PER TABLET    Take 1 tablet by mouth 2 (two) times a day for 7 days smx-tmp DS (BACTRIM) 800-160 mg tabs (1tab q12 D10)       Start Date: 2/2/2018  End Date: 2/9/2018       Order Dose: 1 tablet       Quantity: 14 tablet    Refills: 0     No discharge procedures on file      ED Provider  Electronically Signed by           Tere Neff DO  02/02/18 9633

## 2018-02-03 LAB — GLUCOSE SERPL-MCNC: 349 MG/DL (ref 65–140)

## 2018-02-05 ENCOUNTER — TELEPHONE (OUTPATIENT)
Dept: FAMILY MEDICINE CLINIC | Facility: CLINIC | Age: 83
End: 2018-02-05

## 2018-03-20 ENCOUNTER — OFFICE VISIT (OUTPATIENT)
Dept: FAMILY MEDICINE CLINIC | Facility: CLINIC | Age: 83
End: 2018-03-20
Payer: MEDICARE

## 2018-03-20 VITALS
WEIGHT: 195 LBS | DIASTOLIC BLOOD PRESSURE: 72 MMHG | BODY MASS INDEX: 28.88 KG/M2 | SYSTOLIC BLOOD PRESSURE: 130 MMHG | HEIGHT: 69 IN

## 2018-03-20 DIAGNOSIS — Z00.00 MEDICARE ANNUAL WELLNESS VISIT, SUBSEQUENT: ICD-10-CM

## 2018-03-20 DIAGNOSIS — Z79.4 TYPE 2 DIABETES MELLITUS WITH HYPERGLYCEMIA, WITH LONG-TERM CURRENT USE OF INSULIN (HCC): ICD-10-CM

## 2018-03-20 DIAGNOSIS — I10 ESSENTIAL HYPERTENSION: Primary | Chronic | ICD-10-CM

## 2018-03-20 DIAGNOSIS — R26.81 GAIT INSTABILITY: ICD-10-CM

## 2018-03-20 DIAGNOSIS — E11.65 TYPE 2 DIABETES MELLITUS WITH HYPERGLYCEMIA, WITH LONG-TERM CURRENT USE OF INSULIN (HCC): ICD-10-CM

## 2018-03-20 DIAGNOSIS — F03.90 DEMENTIA WITHOUT BEHAVIORAL DISTURBANCE, UNSPECIFIED DEMENTIA TYPE (HCC): ICD-10-CM

## 2018-03-20 PROCEDURE — G0439 PPPS, SUBSEQ VISIT: HCPCS | Performed by: FAMILY MEDICINE

## 2018-03-23 ENCOUNTER — TELEPHONE (OUTPATIENT)
Dept: FAMILY MEDICINE CLINIC | Facility: CLINIC | Age: 83
End: 2018-03-23

## 2018-03-23 NOTE — TELEPHONE ENCOUNTER
olga toth did PT katie doing Pt 2 times a week for safety and balance  No other issues   Except he for got his pills last night and sugar up this this am  She will let his nurse know    NOEL

## 2018-03-29 ENCOUNTER — TELEPHONE (OUTPATIENT)
Dept: FAMILY MEDICINE CLINIC | Facility: CLINIC | Age: 83
End: 2018-03-29

## 2018-03-29 DIAGNOSIS — E11.9 TYPE 2 DIABETES MELLITUS WITHOUT COMPLICATION, UNSPECIFIED LONG TERM INSULIN USE STATUS: Primary | ICD-10-CM

## 2018-04-09 ENCOUNTER — TELEPHONE (OUTPATIENT)
Dept: FAMILY MEDICINE CLINIC | Facility: CLINIC | Age: 83
End: 2018-04-09

## 2018-04-09 NOTE — TELEPHONE ENCOUNTER
SUGARS ARE OUT OF CONTROL, THIS MORNING WHEN HE WOKE UP THEY WERE 600  AT 10:  AT 11:, AT 12:30 - 468  WHAT TO Do  [address] [place] [address] [phone]

## 2018-04-09 NOTE — TELEPHONE ENCOUNTER
If his blood sugar is really 600 he needs to be taken to the emergency room for admission to hospital

## 2018-07-14 ENCOUNTER — APPOINTMENT (INPATIENT)
Dept: RADIOLOGY | Facility: HOSPITAL | Age: 83
DRG: 682 | End: 2018-07-14
Payer: MEDICARE

## 2018-07-14 ENCOUNTER — HOSPITAL ENCOUNTER (INPATIENT)
Facility: HOSPITAL | Age: 83
LOS: 2 days | Discharge: HOME HEALTH | DRG: 682 | End: 2018-07-16
Attending: EMERGENCY MEDICINE | Admitting: INTERNAL MEDICINE
Payer: MEDICARE

## 2018-07-14 DIAGNOSIS — R26.2 AMBULATORY DYSFUNCTION: ICD-10-CM

## 2018-07-14 DIAGNOSIS — I10 ESSENTIAL HYPERTENSION: Chronic | ICD-10-CM

## 2018-07-14 DIAGNOSIS — E11.65 UNCONTROLLED TYPE 2 DIABETES MELLITUS WITH HYPERGLYCEMIA, WITH LONG-TERM CURRENT USE OF INSULIN (HCC): Chronic | ICD-10-CM

## 2018-07-14 DIAGNOSIS — R73.9 HYPERGLYCEMIA: Primary | ICD-10-CM

## 2018-07-14 DIAGNOSIS — Z79.4 UNCONTROLLED TYPE 2 DIABETES MELLITUS WITH HYPERGLYCEMIA, WITH LONG-TERM CURRENT USE OF INSULIN (HCC): Chronic | ICD-10-CM

## 2018-07-14 PROBLEM — E86.0 DEHYDRATION WITH HYPONATREMIA: Status: ACTIVE | Noted: 2017-06-13

## 2018-07-14 PROBLEM — E87.0 HYPEROSMOLAR SYNDROME: Status: ACTIVE | Noted: 2018-07-14

## 2018-07-14 PROBLEM — N17.9 AKI (ACUTE KIDNEY INJURY) (HCC): Status: ACTIVE | Noted: 2018-07-14

## 2018-07-14 PROBLEM — D69.6 THROMBOCYTOPENIA (HCC): Chronic | Status: ACTIVE | Noted: 2018-07-14

## 2018-07-14 PROBLEM — R42 DIZZINESS: Status: ACTIVE | Noted: 2018-07-14

## 2018-07-14 LAB
ACETONE SERPL-MCNC: NEGATIVE MG/DL
ALBUMIN SERPL BCP-MCNC: 2.8 G/DL (ref 3.5–5)
ALP SERPL-CCNC: 73 U/L (ref 46–116)
ALT SERPL W P-5'-P-CCNC: 27 U/L (ref 12–78)
ANION GAP SERPL CALCULATED.3IONS-SCNC: 11 MMOL/L (ref 4–13)
ANION GAP SERPL CALCULATED.3IONS-SCNC: 8 MMOL/L (ref 4–13)
AST SERPL W P-5'-P-CCNC: 20 U/L (ref 5–45)
BACTERIA UR QL AUTO: ABNORMAL /HPF
BASE EX.OXY STD BLDV CALC-SCNC: 65 % (ref 60–80)
BASE EXCESS BLDV CALC-SCNC: -3.6 MMOL/L
BASOPHILS # BLD AUTO: 0.02 THOUSANDS/ΜL (ref 0–0.1)
BASOPHILS NFR BLD AUTO: 1 % (ref 0–1)
BILIRUB SERPL-MCNC: 0.4 MG/DL (ref 0.2–1)
BILIRUB UR QL STRIP: NEGATIVE
BUN SERPL-MCNC: 26 MG/DL (ref 5–25)
BUN SERPL-MCNC: 29 MG/DL (ref 5–25)
CALCIUM SERPL-MCNC: 8.1 MG/DL (ref 8.3–10.1)
CALCIUM SERPL-MCNC: 8.8 MG/DL (ref 8.3–10.1)
CHLORIDE SERPL-SCNC: 101 MMOL/L (ref 100–108)
CHLORIDE SERPL-SCNC: 95 MMOL/L (ref 100–108)
CLARITY UR: CLEAR
CO2 SERPL-SCNC: 23 MMOL/L (ref 21–32)
CO2 SERPL-SCNC: 24 MMOL/L (ref 21–32)
COLOR UR: YELLOW
CREAT SERPL-MCNC: 1.46 MG/DL (ref 0.6–1.3)
CREAT SERPL-MCNC: 1.82 MG/DL (ref 0.6–1.3)
EOSINOPHIL # BLD AUTO: 0.07 THOUSAND/ΜL (ref 0–0.61)
EOSINOPHIL NFR BLD AUTO: 2 % (ref 0–6)
ERYTHROCYTE [DISTWIDTH] IN BLOOD BY AUTOMATED COUNT: 14.4 % (ref 11.6–15.1)
GFR SERPL CREATININE-BSD FRML MDRD: 34 ML/MIN/1.73SQ M
GFR SERPL CREATININE-BSD FRML MDRD: 44 ML/MIN/1.73SQ M
GLUCOSE SERPL-MCNC: 286 MG/DL (ref 65–140)
GLUCOSE SERPL-MCNC: 303 MG/DL (ref 65–140)
GLUCOSE SERPL-MCNC: 408 MG/DL (ref 65–140)
GLUCOSE SERPL-MCNC: 476 MG/DL (ref 65–140)
GLUCOSE SERPL-MCNC: 597 MG/DL (ref 65–140)
GLUCOSE SERPL-MCNC: >500 MG/DL (ref 65–140)
GLUCOSE SERPL-MCNC: >500 MG/DL (ref 65–140)
GLUCOSE UR STRIP-MCNC: ABNORMAL MG/DL
HCO3 BLDV-SCNC: 21.8 MMOL/L (ref 24–30)
HCT VFR BLD AUTO: 37.2 % (ref 36.5–49.3)
HGB BLD-MCNC: 12.4 G/DL (ref 12–17)
HGB UR QL STRIP.AUTO: ABNORMAL
KETONES UR STRIP-MCNC: NEGATIVE MG/DL
LACTATE SERPL-SCNC: 1.8 MMOL/L (ref 0.5–2)
LACTATE SERPL-SCNC: 2.7 MMOL/L (ref 0.5–2)
LEUKOCYTE ESTERASE UR QL STRIP: NEGATIVE
LYMPHOCYTES # BLD AUTO: 0.82 THOUSANDS/ΜL (ref 0.6–4.47)
LYMPHOCYTES NFR BLD AUTO: 19 % (ref 14–44)
MCH RBC QN AUTO: 26.3 PG (ref 26.8–34.3)
MCHC RBC AUTO-ENTMCNC: 33.3 G/DL (ref 31.4–37.4)
MCV RBC AUTO: 79 FL (ref 82–98)
MONOCYTES # BLD AUTO: 0.4 THOUSAND/ΜL (ref 0.17–1.22)
MONOCYTES NFR BLD AUTO: 10 % (ref 4–12)
NEUTROPHILS # BLD AUTO: 2.92 THOUSANDS/ΜL (ref 1.85–7.62)
NEUTS SEG NFR BLD AUTO: 69 % (ref 43–75)
NITRITE UR QL STRIP: NEGATIVE
NON-SQ EPI CELLS URNS QL MICRO: ABNORMAL /HPF
O2 CT BLDV-SCNC: 11.7 ML/DL
PCO2 BLDV: 40.5 MM HG (ref 42–50)
PH BLDV: 7.35 [PH] (ref 7.3–7.4)
PH UR STRIP.AUTO: 5.5 [PH] (ref 4.5–8)
PLATELET # BLD AUTO: 83 THOUSANDS/UL (ref 149–390)
PMV BLD AUTO: 9.8 FL (ref 8.9–12.7)
PO2 BLDV: 36.1 MM HG (ref 35–45)
POTASSIUM SERPL-SCNC: 4.6 MMOL/L (ref 3.5–5.3)
POTASSIUM SERPL-SCNC: 4.8 MMOL/L (ref 3.5–5.3)
PROT SERPL-MCNC: 7.4 G/DL (ref 6.4–8.2)
PROT UR STRIP-MCNC: NEGATIVE MG/DL
RBC # BLD AUTO: 4.71 MILLION/UL (ref 3.88–5.62)
RBC #/AREA URNS AUTO: ABNORMAL /HPF
SODIUM SERPL-SCNC: 129 MMOL/L (ref 136–145)
SODIUM SERPL-SCNC: 133 MMOL/L (ref 136–145)
SP GR UR STRIP.AUTO: 1.01 (ref 1–1.03)
TROPONIN I SERPL-MCNC: <0.02 NG/ML
TROPONIN I SERPL-MCNC: <0.02 NG/ML
TSH SERPL DL<=0.05 MIU/L-ACNC: 1.65 UIU/ML (ref 0.36–3.74)
UROBILINOGEN UR QL STRIP.AUTO: 0.2 E.U./DL
WBC # BLD AUTO: 4.23 THOUSAND/UL (ref 4.31–10.16)
WBC #/AREA URNS AUTO: ABNORMAL /HPF

## 2018-07-14 PROCEDURE — 36415 COLL VENOUS BLD VENIPUNCTURE: CPT | Performed by: EMERGENCY MEDICINE

## 2018-07-14 PROCEDURE — 85025 COMPLETE CBC W/AUTO DIFF WBC: CPT | Performed by: EMERGENCY MEDICINE

## 2018-07-14 PROCEDURE — 96360 HYDRATION IV INFUSION INIT: CPT

## 2018-07-14 PROCEDURE — 82948 REAGENT STRIP/BLOOD GLUCOSE: CPT

## 2018-07-14 PROCEDURE — 83036 HEMOGLOBIN GLYCOSYLATED A1C: CPT | Performed by: INTERNAL MEDICINE

## 2018-07-14 PROCEDURE — 99285 EMERGENCY DEPT VISIT HI MDM: CPT

## 2018-07-14 PROCEDURE — 84484 ASSAY OF TROPONIN QUANT: CPT | Performed by: EMERGENCY MEDICINE

## 2018-07-14 PROCEDURE — 82805 BLOOD GASES W/O2 SATURATION: CPT | Performed by: EMERGENCY MEDICINE

## 2018-07-14 PROCEDURE — 96372 THER/PROPH/DIAG INJ SC/IM: CPT

## 2018-07-14 PROCEDURE — 96361 HYDRATE IV INFUSION ADD-ON: CPT

## 2018-07-14 PROCEDURE — 82009 KETONE BODYS QUAL: CPT | Performed by: EMERGENCY MEDICINE

## 2018-07-14 PROCEDURE — 83605 ASSAY OF LACTIC ACID: CPT | Performed by: INTERNAL MEDICINE

## 2018-07-14 PROCEDURE — 81001 URINALYSIS AUTO W/SCOPE: CPT | Performed by: EMERGENCY MEDICINE

## 2018-07-14 PROCEDURE — 99223 1ST HOSP IP/OBS HIGH 75: CPT | Performed by: INTERNAL MEDICINE

## 2018-07-14 PROCEDURE — 80048 BASIC METABOLIC PNL TOTAL CA: CPT | Performed by: EMERGENCY MEDICINE

## 2018-07-14 PROCEDURE — 87040 BLOOD CULTURE FOR BACTERIA: CPT | Performed by: INTERNAL MEDICINE

## 2018-07-14 PROCEDURE — 84484 ASSAY OF TROPONIN QUANT: CPT | Performed by: INTERNAL MEDICINE

## 2018-07-14 PROCEDURE — 93005 ELECTROCARDIOGRAM TRACING: CPT

## 2018-07-14 PROCEDURE — 80053 COMPREHEN METABOLIC PANEL: CPT | Performed by: EMERGENCY MEDICINE

## 2018-07-14 PROCEDURE — 80171 DRUG SCREEN QUANT GABAPENTIN: CPT | Performed by: INTERNAL MEDICINE

## 2018-07-14 PROCEDURE — 83605 ASSAY OF LACTIC ACID: CPT | Performed by: PHYSICIAN ASSISTANT

## 2018-07-14 PROCEDURE — 84443 ASSAY THYROID STIM HORMONE: CPT | Performed by: INTERNAL MEDICINE

## 2018-07-14 PROCEDURE — 71045 X-RAY EXAM CHEST 1 VIEW: CPT

## 2018-07-14 RX ORDER — ASPIRIN 81 MG/1
81 TABLET ORAL DAILY
Status: DISCONTINUED | OUTPATIENT
Start: 2018-07-15 | End: 2018-07-16 | Stop reason: HOSPADM

## 2018-07-14 RX ORDER — ONDANSETRON 2 MG/ML
4 INJECTION INTRAMUSCULAR; INTRAVENOUS EVERY 6 HOURS PRN
Status: DISCONTINUED | OUTPATIENT
Start: 2018-07-14 | End: 2018-07-16 | Stop reason: HOSPADM

## 2018-07-14 RX ORDER — MELATONIN
2000 DAILY
Status: DISCONTINUED | OUTPATIENT
Start: 2018-07-15 | End: 2018-07-16 | Stop reason: HOSPADM

## 2018-07-14 RX ORDER — INSULIN GLARGINE 100 [IU]/ML
20 INJECTION, SOLUTION SUBCUTANEOUS
Status: DISCONTINUED | OUTPATIENT
Start: 2018-07-14 | End: 2018-07-15

## 2018-07-14 RX ORDER — GABAPENTIN 100 MG/1
100 CAPSULE ORAL
Status: DISCONTINUED | OUTPATIENT
Start: 2018-07-14 | End: 2018-07-16 | Stop reason: HOSPADM

## 2018-07-14 RX ORDER — NITROGLYCERIN 0.4 MG/1
0.4 TABLET SUBLINGUAL
Status: DISCONTINUED | OUTPATIENT
Start: 2018-07-14 | End: 2018-07-16 | Stop reason: HOSPADM

## 2018-07-14 RX ORDER — INSULIN GLARGINE 100 [IU]/ML
15 INJECTION, SOLUTION SUBCUTANEOUS
Status: DISCONTINUED | OUTPATIENT
Start: 2018-07-14 | End: 2018-07-14

## 2018-07-14 RX ORDER — DOCUSATE SODIUM 100 MG/1
100 CAPSULE, LIQUID FILLED ORAL 2 TIMES DAILY
Status: DISCONTINUED | OUTPATIENT
Start: 2018-07-14 | End: 2018-07-15

## 2018-07-14 RX ORDER — PRAVASTATIN SODIUM 20 MG
10 TABLET ORAL
Status: DISCONTINUED | OUTPATIENT
Start: 2018-07-14 | End: 2018-07-16 | Stop reason: HOSPADM

## 2018-07-14 RX ORDER — ACETAMINOPHEN 325 MG/1
650 TABLET ORAL EVERY 6 HOURS PRN
Status: DISCONTINUED | OUTPATIENT
Start: 2018-07-14 | End: 2018-07-16 | Stop reason: HOSPADM

## 2018-07-14 RX ORDER — SODIUM CHLORIDE AND POTASSIUM CHLORIDE .9; .15 G/100ML; G/100ML
100 SOLUTION INTRAVENOUS CONTINUOUS
Status: DISCONTINUED | OUTPATIENT
Start: 2018-07-14 | End: 2018-07-15

## 2018-07-14 RX ORDER — HYDRALAZINE HYDROCHLORIDE 25 MG/1
25 TABLET, FILM COATED ORAL EVERY 8 HOURS SCHEDULED
Status: DISCONTINUED | OUTPATIENT
Start: 2018-07-14 | End: 2018-07-16 | Stop reason: HOSPADM

## 2018-07-14 RX ORDER — MORPHINE SULFATE 2 MG/ML
2 INJECTION, SOLUTION INTRAMUSCULAR; INTRAVENOUS
Status: DISCONTINUED | OUTPATIENT
Start: 2018-07-14 | End: 2018-07-16 | Stop reason: HOSPADM

## 2018-07-14 RX ORDER — DONEPEZIL HYDROCHLORIDE 5 MG/1
5 TABLET, FILM COATED ORAL
Status: DISCONTINUED | OUTPATIENT
Start: 2018-07-14 | End: 2018-07-16 | Stop reason: HOSPADM

## 2018-07-14 RX ADMIN — INSULIN LISPRO 6 UNITS: 100 INJECTION, SOLUTION INTRAVENOUS; SUBCUTANEOUS at 18:30

## 2018-07-14 RX ADMIN — SODIUM CHLORIDE 1000 ML: 0.9 INJECTION, SOLUTION INTRAVENOUS at 14:59

## 2018-07-14 RX ADMIN — INSULIN GLARGINE 20 UNITS: 100 INJECTION, SOLUTION SUBCUTANEOUS at 21:47

## 2018-07-14 RX ADMIN — PRAVASTATIN SODIUM 10 MG: 20 TABLET ORAL at 21:47

## 2018-07-14 RX ADMIN — INSULIN HUMAN 8 UNITS: 100 INJECTION, SOLUTION PARENTERAL at 18:28

## 2018-07-14 RX ADMIN — DOCUSATE SODIUM 100 MG: 100 CAPSULE, LIQUID FILLED ORAL at 18:34

## 2018-07-14 RX ADMIN — INSULIN LISPRO 10 UNITS: 100 INJECTION, SOLUTION INTRAVENOUS; SUBCUTANEOUS at 15:43

## 2018-07-14 RX ADMIN — GABAPENTIN 100 MG: 100 CAPSULE ORAL at 21:48

## 2018-07-14 RX ADMIN — SODIUM CHLORIDE 1000 ML: 0.9 INJECTION, SOLUTION INTRAVENOUS at 13:28

## 2018-07-14 RX ADMIN — SODIUM CHLORIDE AND POTASSIUM CHLORIDE 100 ML/HR: .9; .15 SOLUTION INTRAVENOUS at 18:37

## 2018-07-14 RX ADMIN — HYDRALAZINE HYDROCHLORIDE 25 MG: 25 TABLET, FILM COATED ORAL at 21:47

## 2018-07-14 RX ADMIN — INSULIN LISPRO 6 UNITS: 100 INJECTION, SOLUTION INTRAVENOUS; SUBCUTANEOUS at 18:31

## 2018-07-14 RX ADMIN — DONEPEZIL HYDROCHLORIDE 5 MG: 5 TABLET, FILM COATED ORAL at 21:47

## 2018-07-14 RX ADMIN — INSULIN LISPRO 4 UNITS: 100 INJECTION, SOLUTION INTRAVENOUS; SUBCUTANEOUS at 21:47

## 2018-07-14 NOTE — H&P
History and Physical - [facility] Internal Medicine    Patient Information: Norman More 81 y o  male MRN: [id number]  Unit/Bed#: 423-01 Encounter: [id number]  Admitting Physician: Nathan Ruiz MD  PCP: Reece Houston DO  Date of Admission:  07/14/18    Assessment/Plan:    Hospital Problem List:     Principal Problem:    Hyperosmolar syndrome  Active Problems:    Essential hypertension    Ambulatory dysfunction    Dementia    Dehydration with hyponatremia    CKD (chronic kidney disease) stage 3, GFR 30-59 ml/min    Uncontrolled type 2 diabetes mellitus with hyperglycemia, with long-term current use of insulin ([facility])    VIRA (acute kidney injury) ([facility] [address] )    Dizziness    Chronic thrombocytopenia    Plan for the Primary Problem(s):  · Admit to telemetry, serial troponin, TTE, cardiology consult, orthostatic vitals, stool softeners  · Resume basal & premeal insulin, regular insulin IV bolus PRN, TSH & a1c, panculture  · Immediate ICU transfer if unstable or clinical deterioration    Plan for Additional Problems:   · Baby ASA, monitor CBC for worsened thrombocytopenia  · Cont fluid resuscitation with saline IV, monitor renal fxn, hold ACEi, nephrology consult if worse   · Dizziness likely situational & vasovagal, inpatient stress test deferred to cardiology  · PT/OT eval, ? SNF geropsych unit placement    VTE Prophylaxis: Enoxaparin (Lovenox)  / sequential compression device   Code Status: full  POLST: There is no POLST form on file for this patient (pre-hospital)    Anticipated Length of Stay:  Patient will be admitted on an Inpatient basis with an anticipated length of stay of  > 2 midnights  Justification for Hospital Stay: IV fluids    Chief Complaint:   Dizziness at [facility] this afternoon    History of Present Illness:    Norman More is a 81 y o  male who presents via EMS to the ED this afternoon following what appears to be a near syncopal episode   He is a poor historian & most of the history was obtained from his medical records  He currently denies any chest pain, dyspnea, lightheadedness, cough, fever, nausea, vomiting, abd pain, diarrhea, dysuria, headache or neck stiffness  It appears he was found by [facility] staff complaining of dizziness while sitting on the toilet having a bowel movement  His CBG via EMS was 552 with a serum glucose of 597 upon ED arrival with a BP of 114/68  He has received 2L NS IV bolus & 10u subcut humalog at the ED  Review of Systems:    A 10+ point review of systems was obtained & is as above, otherwise negative  Review of Systems    Past Medical and Surgical History:     Past Medical History:   Diagnosis Date    Age-related cognitive decline     CKD (chronic kidney disease) stage 3, GFR 30-59 ml/min 08/16/2016    Dementia     Diabetes mellitus ([facility] [address]     Hearing loss     History of kidney stones     Hypertension     Thrombocytopenia (HCC)     Uncontrolled type 2 diabetes mellitus with hyperglycemia, with long-term current use of insulin ([facility] [address] ) 7/14/2018       Past Surgical History:   Procedure Laterality Date    APPENDECTOMY      CYSTOSCOPY Right 8/14/2016    Procedure: CYSTOSCOPY, right uretereoscopy,, stone extraction, stent ;  Surgeon: Sea Limon MD;  [facility];  Service:     NEPHRECTOMY      THYROIDECTOMY      75% removed from one side and 25% removed from other side; Pt is not sure which side had what removed; Resolved 1990s       Meds/Allergies:    Prior to Admission medications    Medication Sig Start Date End Date Taking?  Authorizing Provider   JAN MICROLET LANCETS lancets by Does not apply route 8/8/17   Historical Provider, MD   cholecalciferol 2000 units TABS Take 1 tablet by mouth daily 11/5/17   Jeanne Marion DO   donepezil (ARICEPT) 5 mg tablet Take 5 mg by mouth daily at bedtime    Historical Provider, MD   donepezil (ARICEPT) 5 mg tablet Take 1 tablet by mouth 9/12/17   Historical Provider, MD   gabapentin (NEURONTIN) 100 mg capsule Take 100 mg by mouth daily at bedtime    Historical Provider, MD   insulin glargine (LANTUS) 100 units/mL subcutaneous injection Inject 36 Units under the skin every morning 11/5/17   Eliecer Andrei, DO   insulin lispro (HumaLOG) 100 units/mL injection Inject 6 Units under the skin 3 (three) times a day with meals 6/16/17   Radha Bound, DO   insulin lispro (HumaLOG) 100 units/mL injection Inject 1-6 Units under the skin 3 (three) times a day before meals 11/4/17   Eliecer Andrei, DO   insulin lispro (HUMALOG) 100 units/mL injection Inject 6 Units under the skin Daily    Historical Provider, MD   Insulin Syringe-Needle U-100 (BD INSULIN SYRINGE ULTRAFINE) 31G X 15/64" 0 3 ML MISC by Does not apply route 7/20/17   Historical Provider, MD   lisinopril (ZESTRIL) 5 mg tablet Take 5 mg by mouth 2 (two) times a day  Historical Provider, MD   pravastatin (PRAVACHOL) 10 mg tablet Take 10 mg by mouth daily at bedtime  Historical Provider, MD   pravastatin (PRAVACHOL) 10 mg tablet Take 1 tablet by mouth 3/23/16   Historical Provider, MD     I have reviewed home medications with patient personally  Allergies: No Known Allergies    Social History:     Marital Status:     Patient Pre-hospital Living Situation: home    Substance Use History:   History   Alcohol Use No     Comment: patient denies drinking alcohol     History   Smoking Status    Former Smoker    Quit date: 1953   Smokeless Tobacco    Never Used     History   Drug Use No       Family History:    non-contributory    Physical Exam:   General: elderly, in no overt distress  HEENT: oral mucosa dry, not pale, not jaundiced  Neck: supple, no JVD  Resp: clear lungs, no crackles or wheeze  Cardiovascular: S1 S2 audible, regular  GI: soft abdomen, nontender, bowel sounds present  Musculoskeletal: no pedal edema or cyanosis  Skin: dry skin turgor  Psych: oriented to person & place  Neuro: Awake, alert, hard of hearing, pleasantly confused, essentially nonfocal      Vitals:   Blood Pressure: (!) 185/88 (07/14/18 1728)  Pulse: 78 (07/14/18 1728)  Temperature: (!) 97 2 °F (36 2 °C) (07/14/18 1728)  Temp Source: Temporal (07/14/18 1728)  Respirations: 20 (07/14/18 1728)  Height: 5' 9" (175 3 cm) (07/14/18 1728)  Weight - Scale: 85 kg (187 lb 6 3 oz) (07/14/18 1728)  SpO2: 96 % (07/14/18 1728)      Additional Data:     Lab Results: I have personally reviewed pertinent reports  Results from last 7 days  Lab Units 07/14/18  1323   WBC Thousand/uL 4 23*   HEMOGLOBIN g/dL 12 4   HEMATOCRIT % 37 2   PLATELETS Thousands/uL 83*   NEUTROS PCT % 69   LYMPHS PCT % 19   MONOS PCT % 10   EOS PCT % 2       Results from last 7 days  Lab Units 07/14/18  1704 07/14/18  1323   SODIUM mmol/L 133* 129*   POTASSIUM mmol/L 4 6 4 8   CHLORIDE mmol/L 101 95*   CO2 mmol/L 24 23   BUN mg/dL 26* 29*   CREATININE mg/dL 1 46* 1 82*   CALCIUM mg/dL 8 1* 8 8   TOTAL PROTEIN g/dL  --  7 4   BILIRUBIN TOTAL mg/dL  --  0 40   ALK PHOS U/L  --  73   ALT U/L  --  27   AST U/L  --  20   GLUCOSE RANDOM mg/dL 476* 597*         Invalid input(s): TROIP    Imaging: I have personally reviewed pertinent reports  No results found  EKG, Pathology, and Other Studies Reviewed on Admission:   · EKG: ordered    Allscripts Records Reviewed: Yes     ** Please Note: Dragon 360 Dictation voice to text software may have been used in the creation of this document

## 2018-07-14 NOTE — ED NOTES
Rani Lin from fourth floor is aware of that patient not wanting son aware of admission  Spoke with patient's daughter Tiffanie Arrieta, she is aware of admission        Monroe Redman RN  07/14/18 [id number]

## 2018-07-14 NOTE — ED NOTES
Spoke with patient's daughter Lisa Fuentes on the phone, at patients request  Phone number provided by patient - [phone]  Patient is requesting we don't contact his son at this time  Updated Lisa Fuentes on current plan for patient and will call later with admission versus discharge decision        Delvin Grullon RN  07/14/18 1614

## 2018-07-14 NOTE — PLAN OF CARE
Problem: Potential for Falls  Goal: Patient will remain free of falls  INTERVENTIONS:  - Assess patient frequently for physical needs  -  Identify cognitive and physical deficits and behaviors that affect risk of falls  -  [place] fall precautions as indicated by assessment   High fall risk    - Educate patient/family on patient safety including physical limitations  - Instruct patient to call for assistance with activity based on assessment  - Modify environment to reduce risk of injury  - Consider OT/PT consult to assist with strengthening/mobility   Outcome: Progressing      Problem: PAIN - ADULT  Goal: Verbalizes/displays adequate comfort level or baseline comfort level  Interventions:  - Encourage patient to monitor pain and request assistance  - Assess pain using appropriate pain scale  - Administer analgesics based on type and severity of pain and evaluate response  - Implement non-pharmacological measures as appropriate and evaluate response  - Consider cultural and social influences on pain and pain management  - Notify physician/advanced practitioner if interventions unsuccessful or patient reports new pain   Outcome: Progressing      Problem: INFECTION - ADULT  Goal: Absence or prevention of progression during hospitalization  INTERVENTIONS:  - Assess and monitor for signs and symptoms of infection  - Monitor lab/diagnostic results  - Monitor all insertion sites, i e  indwelling lines, tubes, and drains  - [place] appropriate cooling/warming therapies per order  - Administer medications as ordered  - Instruct and encourage patient and family to use good hand hygiene technique   Outcome: Progressing      Problem: SAFETY ADULT  Goal: Maintain or return to baseline ADL function  INTERVENTIONS:  -  Assess patient's ability to carry out ADLs; assess patient's baseline for ADL function and identify physical deficits which impact ability to perform ADLs (bathing, care of mouth/teeth, toileting, grooming, dressing, etc )  - Assess/evaluate cause of self-care deficits   - Assess range of motion  - Assess patient's mobility; develop plan if impaired  - Assess patient's need for assistive devices and provide as appropriate  - Encourage maximum independence but intervene and supervise when necessary  ¯ Involve family in performance of ADLs  ¯ Assess for home care needs following discharge   ¯ Request OT consult to assist with ADL evaluation and planning for discharge  ¯ Provide patient education as appropriate   Outcome: Progressing    Goal: Maintain or return mobility status to optimal level  INTERVENTIONS:  - Assess patient's baseline mobility status (ambulation, transfers, stairs, etc )    - Identify cognitive and physical deficits and behaviors that affect mobility  - Identify mobility aids required to assist with transfers and/or ambulation (gait belt, sit-to-stand, lift, walker, cane, etc )  - [place] fall precautions as indicated by assessment  - Record patient progress and toleration of activity level on Mobility SBAR; progress patient to next Phase/Stage  - Instruct patient to call for assistance with activity based on assessment  - Request Rehabilitation consult to assist with strengthening/weightbearing, etc    Outcome: Progressing      Problem: DISCHARGE PLANNING  Goal: Discharge to home or other facility with appropriate resources  INTERVENTIONS:  - Identify barriers to discharge w/patient and caregiver  - Arrange for needed discharge resources and transportation as appropriate  - Identify discharge learning needs (meds, wound care, etc )  - Arrange for interpretive services to assist at discharge as needed  - Refer to Case Management Department for coordinating discharge planning if the patient needs post-hospital services based on physician/advanced practitioner order or complex needs related to functional status, cognitive ability, or social support system   Outcome: Progressing      Problem: Knowledge Deficit  Goal: Patient/family/caregiver demonstrates understanding of disease process, treatment plan, medications, and discharge instructions  Complete learning assessment and assess knowledge base    Interventions:  - Provide teaching at level of understanding  - Provide teaching via preferred learning methods   Outcome: Progressing      Problem: Prexisting or High Potential for Compromised Skin Integrity  Goal: Skin integrity is maintained or improved  INTERVENTIONS:  - Identify patients at risk for skin breakdown  - Assess and monitor skin integrity  - Assess and monitor nutrition and hydration status  - Monitor labs (i e  albumin)  - Assess for incontinence   - Turn and reposition patient  - Assist with mobility/ambulation  - Relieve pressure over bony prominences  - Avoid friction and shearing  - Provide appropriate hygiene as needed including keeping skin clean and dry  - Evaluate need for skin moisturizer/barrier cream  - Collaborate with interdisciplinary team (i e  Nutrition, Rehabilitation, etc )   - Patient/family teaching   Outcome: Progressing

## 2018-07-14 NOTE — ED PROVIDER NOTES
Pt Name: Karoline Deshpande  MRN: [id number]  [place] 1935  Age/Sex: 81 y o  male  Date of evaluation: 7/14/2018  PCP: Taco Lara, [address] [place] [address]    Chief Complaint   Patient presents with    Hyperglycemia - Symptomatic     pt was at the [facility] where staff found him sitting on the toilet complaining of dizziness  Patient was having a bowel movement when the episode began  EMS noted patient's blood sugar was 552   Dizziness         HPI    Falcon Peers presents to the Emergency Department complaining of dizziness  He was at the [facility] having a bowel movement when he started to feel weak and dizzy  History provided by:  Patient   used: No          Past Medical and Surgical History    Past Medical History:   Diagnosis Date    Age-related cognitive decline     CKD (chronic kidney disease) stage 3, GFR 30-59 ml/min 08/16/2016    Dementia     Diabetes mellitus [facility]     Hearing loss     History of kidney stones     Hypertension     Thrombocytopenia [facility]     Uncontrolled type 2 diabetes mellitus with hyperglycemia, with long-term current use of insulin ([facility] ) 7/14/2018       Past Surgical History:   Procedure Laterality Date    APPENDECTOMY      CYSTOSCOPY Right 8/14/2016    Procedure: CYSTOSCOPY, right uretereoscopy,, stone extraction, stent ;  Surgeon: Caitlyn Lind MD;  [facility]: [facility];  Service:     NEPHRECTOMY      THYROIDECTOMY      75% removed from one side and 25% removed from other side; Pt is not sure which side had what removed; Resolved 1990s       Family History   Problem Relation Age of Onset    Cataracts Mother        Social History   Substance Use Topics    Smoking status: Former Smoker     Quit date: 1953    Smokeless tobacco: Never Used    Alcohol use No      Comment: patient denies drinking alcohol               Allergies    No Known Allergies    Home Medications    Prior to Admission medications    Medication Sig Start Date End Date Taking? Authorizing Provider   JAN MICROLET LANCETS lancets by Does not apply route 8/8/17   Historical Provider, MD   cholecalciferol 2000 units TABS Take 1 tablet by mouth daily 11/5/17   Navin Barbour,    donepezil (ARICEPT) 5 mg tablet Take 5 mg by mouth daily at bedtime    Historical Provider, MD   donepezil (ARICEPT) 5 mg tablet Take 1 tablet by mouth 9/12/17   Historical Provider, MD   gabapentin (NEURONTIN) 100 mg capsule Take 100 mg by mouth daily at bedtime    Historical Provider, MD   insulin glargine (LANTUS) 100 units/mL subcutaneous injection Inject 36 Units under the skin every morning 11/5/17   Navin Barbour, DO   insulin lispro (HumaLOG) 100 units/mL injection Inject 6 Units under the skin 3 (three) times a day with meals 6/16/17   Ankit Brian, DO   insulin lispro (HumaLOG) 100 units/mL injection Inject 1-6 Units under the skin 3 (three) times a day before meals 11/4/17   Navin Barbour, DO   insulin lispro (HUMALOG) 100 units/mL injection Inject 6 Units under the skin Daily    Historical Provider, MD   Insulin Syringe-Needle U-100 (BD INSULIN SYRINGE ULTRAFINE) 31G X 15/64" 0 3 ML MISC by Does not apply route 7/20/17   Historical Provider, MD   lisinopril (ZESTRIL) 5 mg tablet Take 5 mg by mouth 2 (two) times a day  Historical Provider, MD   pravastatin (PRAVACHOL) 10 mg tablet Take 10 mg by mouth daily at bedtime  Historical Provider, MD   pravastatin (PRAVACHOL) 10 mg tablet Take 1 tablet by mouth 3/23/16   Historical Provider, MD           Review of Systems    Review of Systems   Constitutional: Negative for activity change, appetite change, chills, fatigue and fever  HENT: Negative for congestion, rhinorrhea, sinus pressure, sneezing, sore throat and trouble swallowing  Eyes: Negative for photophobia and visual disturbance  Respiratory: Negative for chest tightness, shortness of breath and wheezing  Cardiovascular: Negative for chest pain and leg swelling  Gastrointestinal: Negative for abdominal distention, abdominal pain, constipation, diarrhea, nausea and vomiting  Endocrine: Negative for polydipsia, polyphagia and polyuria  Genitourinary: Negative for decreased urine volume, difficulty urinating, dysuria, flank pain, frequency and urgency  Musculoskeletal: Negative for back pain, gait problem, joint swelling and neck pain  Skin: Negative for color change, pallor and rash  Allergic/Immunologic: Negative for immunocompromised state  Neurological: Positive for dizziness and light-headedness  Negative for seizures, syncope, speech difficulty, weakness and headaches  Psychiatric/Behavioral: Negative for confusion  All other systems reviewed and are negative  Physical Exam      ED Triage Vitals [07/14/18 1259]   Temperature Pulse Respirations Blood Pressure SpO2   97 8 °F (36 6 °C) 75 20 114/68 97 %      Temp Source Heart Rate Source Patient Position - Orthostatic VS BP Location FiO2 (%)   Temporal Monitor Sitting Left arm --      Pain Score       No Pain               Physical Exam   Constitutional: He is oriented to person, place, and time  He appears well-developed and well-nourished  No distress  HENT:   Head: Normocephalic and atraumatic  Nose: Nose normal    Mouth/Throat: Oropharynx is clear and moist    Eyes: Conjunctivae and EOM are normal  Pupils are equal, round, and reactive to light  Neck: Normal range of motion  Neck supple  Cardiovascular: Normal rate, regular rhythm and normal heart sounds  Exam reveals no gallop and no friction rub  No murmur heard  Pulmonary/Chest: Effort normal and breath sounds normal  No respiratory distress  He has no wheezes  He has no rales  Abdominal: Soft  Bowel sounds are normal  There is no tenderness  There is no rebound and no guarding  Musculoskeletal: Normal range of motion  Neurological: He is alert and oriented to person, place, and time  Skin: Skin is warm and dry   He is not diaphoretic  Psychiatric: He has a normal mood and affect  His behavior is normal    Nursing note and vitals reviewed  Assessment and Plan    Bryce Nascimento is a 81 y o  male who presents with high blood sugar  Physical examination otherwise unremarkable  Plan will be to perform diagnostic testing and treat symptomatically  MDM    Diagnostic Results    EKG:    EKG interpreted by me  Interpretation by Scott Rivas, DO  EKG reviewed and interpreted independently      Labs:    Results for orders placed or performed during the hospital encounter of 07/14/18   CBC and differential   Result Value Ref Range    WBC 4 23 (L) 4 31 - 10 16 Thousand/uL    RBC 4 71 3 88 - 5 62 Million/uL    Hemoglobin 12 4 12 0 - 17 0 g/dL    Hematocrit 37 2 36 5 - 49 3 %    MCV 79 (L) 82 - 98 fL    MCH 26 3 (L) 26 8 - 34 3 pg    MCHC 33 3 31 4 - 37 4 g/dL    RDW 14 4 11 6 - 15 1 %    MPV 9 8 8 9 - 12 7 fL    Platelets 83 (L) 426 - 390 Thousands/uL    Neutrophils Relative 69 43 - 75 %    Lymphocytes Relative 19 14 - 44 %    Monocytes Relative 10 4 - 12 %    Eosinophils Relative 2 0 - 6 %    Basophils Relative 1 0 - 1 %    Neutrophils Absolute 2 92 1 85 - 7 62 Thousands/µL    Lymphocytes Absolute 0 82 0 60 - 4 47 Thousands/µL    Monocytes Absolute 0 40 0 17 - 1 22 Thousand/µL    Eosinophils Absolute 0 07 0 00 - 0 61 Thousand/µL    Basophils Absolute 0 02 0 00 - 0 10 Thousands/µL   Comprehensive metabolic panel   Result Value Ref Range    Sodium 129 (L) 136 - 145 mmol/L    Potassium 4 8 3 5 - 5 3 mmol/L    Chloride 95 (L) 100 - 108 mmol/L    CO2 23 21 - 32 mmol/L    Anion Gap 11 4 - 13 mmol/L    BUN 29 (H) 5 - 25 mg/dL    Creatinine 1 82 (H) 0 60 - 1 30 mg/dL    Glucose 597 (HH) 65 - 140 mg/dL    Calcium 8 8 8 3 - 10 1 mg/dL    AST 20 5 - 45 U/L    ALT 27 12 - 78 U/L    Alkaline Phosphatase 73 46 - 116 U/L    Total Protein 7 4 6 4 - 8 2 g/dL    Albumin 2 8 (L) 3 5 - 5 0 g/dL    Total Bilirubin 0 40 0 20 - 1 00 mg/dL eGFR 34 ml/min/1 73sq m   Troponin I   Result Value Ref Range    Troponin I <0 02 <=0 04 ng/mL   UA w Reflex to Microscopic w Reflex to Culture   Result Value Ref Range    Color, UA Yellow     Clarity, UA Clear     Specific [place], UA 1 010 1 003 - 1 030    pH, UA 5 5 4 5 - 8 0    Leukocytes, UA Negative Negative    Nitrite, UA Negative Negative    Protein, UA Negative Negative mg/dl    Glucose,  (1/2%) (A) Negative mg/dl    Ketones, UA Negative Negative mg/dl    Urobilinogen, UA 0 2 0 2, 1 0 E U /dl E U /dl    Bilirubin, UA Negative Negative    Blood, UA Trace-Intact Negative, Trace-Intact   Blood gas, venous   Result Value Ref Range    pH, Johny 7 348 7 300 - 7 400    pCO2, Johny 40 5 (L) 42 0 - 50 0 mm Hg    pO2, Johny 36 1 35 0 - 45 0 mm Hg    HCO3, Johny 21 8 (L) 24 - 30 mmol/L    Base Excess, Johny -3 6 mmol/L    O2 Content, Johny 11 7 ml/dL    O2 HGB, VENOUS 65 0 60 0 - 80 0 %   Acetone   Result Value Ref Range    Acetone, Bld Negative Negative   Urine Microscopic   Result Value Ref Range    RBC, UA 1-2 (A) None Seen, 0-5 /hpf    WBC, UA None Seen None Seen, 0-5, 5-55, 5-65 /hpf    Epithelial Cells Occasional None Seen, Occasional /hpf    Bacteria, UA None Seen None Seen, Occasional /hpf   Basic metabolic panel   Result Value Ref Range    Sodium 133 (L) 136 - 145 mmol/L    Potassium 4 6 3 5 - 5 3 mmol/L    Chloride 101 100 - 108 mmol/L    CO2 24 21 - 32 mmol/L    Anion Gap 8 4 - 13 mmol/L    BUN 26 (H) 5 - 25 mg/dL    Creatinine 1 46 (H) 0 60 - 1 30 mg/dL    Glucose 476 (H) 65 - 140 mg/dL    Calcium 8 1 (L) 8 3 - 10 1 mg/dL    eGFR 44 ml/min/1 73sq m   Fingerstick Glucose (POCT)   Result Value Ref Range    POC Glucose >500 (HH) 65 - 140 mg/dl   Fingerstick Glucose (POCT)   Result Value Ref Range    POC Glucose >500 (HH) 65 - 140 mg/dl       All labs reviewed and utilized in the medical decision making process    Radiology:    XR chest portable    (Results Pending)       All radiology studies independently viewed by me and interpreted by the radiologist     Procedure    Procedures    CritCare Time      ED Course of Care and Re-Assessments    Patient wanted to leave AMA and wanted us to call his daughter to come and get him  Nursing did speak with her and she was unable to come and agreed that he should stay  Finally patient was willing to stay for further evaluation and treatment       Medications   nitroglycerin (NITROSTAT) SL tablet 0 4 mg (not administered)   morphine injection 2 mg (not administered)   insulin regular (HumuLIN R,NovoLIN R) injection 8 Units (not administered)   sodium chloride 0 9 % with KCl 20 mEq/L infusion (premix) (not administered)   ondansetron (ZOFRAN) injection 4 mg (not administered)   enoxaparin (LOVENOX) subcutaneous injection 40 mg (not administered)   acetaminophen (TYLENOL) tablet 650 mg (not administered)   insulin lispro (HumaLOG) 100 units/mL subcutaneous injection 1-6 Units (not administered)   insulin lispro (HumaLOG) 100 units/mL subcutaneous injection 1-6 Units (not administered)   insulin glargine (LANTUS) subcutaneous injection 20 Units 0 2 mL (not administered)   aspirin (ECOTRIN LOW STRENGTH) EC tablet 81 mg (not administered)   docusate sodium (COLACE) capsule 100 mg (not administered)   sodium chloride 0 9 % bolus 1,000 mL (0 mL Intravenous Stopped 7/14/18 1428)   sodium chloride 0 9 % bolus 1,000 mL (0 mL Intravenous Stopped 7/14/18 1559)   insulin lispro (HumaLOG) 100 units/mL subcutaneous injection 10 Units (10 Units Subcutaneous Given 7/14/18 1543)           FINAL IMPRESSION    Final diagnoses:   Hyperglycemia         DISPOSITION/PLAN      Time reflects when diagnosis was documented in both MDM as applicable and the Disposition within this note     Time User Action Codes Description Comment    7/14/2018  4:46 PM Ant AGRAWAL Add [R73 9] Hyperglycemia       ED Disposition     ED Disposition Condition Comment    Admit  Case was discussed with AUGUSTIN and the patient's admission status was agreed to be Admission Status: inpatient status to the service of Dr Baylee Weiss   Follow-up Information    None           PATIENT REFERRED TO:    No follow-up provider specified  DISCHARGE MEDICATIONS:    Current Discharge Medication List      CONTINUE these medications which have NOT CHANGED    Details   JAN MICROLET LANCETS lancets by Does not apply route      cholecalciferol 2000 units TABS Take 1 tablet by mouth daily  Qty: 30 tablet, Refills: 0      !! donepezil (ARICEPT) 5 mg tablet Take 5 mg by mouth daily at bedtime      !! donepezil (ARICEPT) 5 mg tablet Take 1 tablet by mouth      gabapentin (NEURONTIN) 100 mg capsule Take 100 mg by mouth daily at bedtime      insulin glargine (LANTUS) 100 units/mL subcutaneous injection Inject 36 Units under the skin every morning  Qty: 10 mL, Refills: 0      !! insulin lispro (HumaLOG) 100 units/mL injection Inject 6 Units under the skin 3 (three) times a day with meals  Qty: 10 mL, Refills: 0      !! insulin lispro (HumaLOG) 100 units/mL injection Inject 1-6 Units under the skin 3 (three) times a day before meals  Qty: 10 mL, Refills: 0      !! insulin lispro (HUMALOG) 100 units/mL injection Inject 6 Units under the skin Daily      Insulin Syringe-Needle U-100 (BD INSULIN SYRINGE ULTRAFINE) 31G X 15/64" 0 3 ML MISC by Does not apply route      lisinopril (ZESTRIL) 5 mg tablet Take 5 mg by mouth 2 (two) times a day  !! pravastatin (PRAVACHOL) 10 mg tablet Take 10 mg by mouth daily at bedtime  !! pravastatin (PRAVACHOL) 10 mg tablet Take 1 tablet by mouth       !! - Potential duplicate medications found  Please discuss with provider  No discharge procedures on file           Lorenzo Chen, [address] [facility], DO  07/14/18 2036

## 2018-07-14 NOTE — PROGRESS NOTES
Pt states "The reason I don't want my son to know that I am in here is because if he knew, He would talk to the doctor and tell him I need to go to a nursing home, and I don't need to be in a nursing home  I have my senses  "

## 2018-07-15 LAB
ALBUMIN SERPL BCP-MCNC: 2.5 G/DL (ref 3.5–5)
ALP SERPL-CCNC: 67 U/L (ref 46–116)
ALT SERPL W P-5'-P-CCNC: 26 U/L (ref 12–78)
ANION GAP SERPL CALCULATED.3IONS-SCNC: 9 MMOL/L (ref 4–13)
AST SERPL W P-5'-P-CCNC: 28 U/L (ref 5–45)
BILIRUB SERPL-MCNC: 0.6 MG/DL (ref 0.2–1)
BUN SERPL-MCNC: 17 MG/DL (ref 5–25)
CALCIUM SERPL-MCNC: 8.4 MG/DL (ref 8.3–10.1)
CHLORIDE SERPL-SCNC: 105 MMOL/L (ref 100–108)
CHOLEST SERPL-MCNC: 139 MG/DL (ref 50–200)
CO2 SERPL-SCNC: 22 MMOL/L (ref 21–32)
CREAT SERPL-MCNC: 1.05 MG/DL (ref 0.6–1.3)
ERYTHROCYTE [DISTWIDTH] IN BLOOD BY AUTOMATED COUNT: 14.4 % (ref 11.6–15.1)
EST. AVERAGE GLUCOSE BLD GHB EST-MCNC: 358 MG/DL
FOLATE SERPL-MCNC: >20 NG/ML (ref 3.1–17.5)
GFR SERPL CREATININE-BSD FRML MDRD: 66 ML/MIN/1.73SQ M
GLUCOSE SERPL-MCNC: 229 MG/DL (ref 65–140)
GLUCOSE SERPL-MCNC: 262 MG/DL (ref 65–140)
GLUCOSE SERPL-MCNC: 271 MG/DL (ref 65–140)
GLUCOSE SERPL-MCNC: 282 MG/DL (ref 65–140)
GLUCOSE SERPL-MCNC: 311 MG/DL (ref 65–140)
HBA1C MFR BLD: 14.1 % (ref 4.2–6.3)
HCT VFR BLD AUTO: 35.4 % (ref 36.5–49.3)
HDLC SERPL-MCNC: 49 MG/DL (ref 40–60)
HGB BLD-MCNC: 11.8 G/DL (ref 12–17)
INR PPP: 1.13 (ref 0.86–1.17)
LDLC SERPL CALC-MCNC: 76 MG/DL (ref 0–100)
MAGNESIUM SERPL-MCNC: 1.8 MG/DL (ref 1.6–2.6)
MCH RBC QN AUTO: 26.2 PG (ref 26.8–34.3)
MCHC RBC AUTO-ENTMCNC: 33.3 G/DL (ref 31.4–37.4)
MCV RBC AUTO: 79 FL (ref 82–98)
NONHDLC SERPL-MCNC: 90 MG/DL
NT-PROBNP SERPL-MCNC: 470 PG/ML
PHOSPHATE SERPL-MCNC: 2.7 MG/DL (ref 2.3–4.1)
PLATELET # BLD AUTO: 75 THOUSANDS/UL (ref 149–390)
PMV BLD AUTO: 9.5 FL (ref 8.9–12.7)
POTASSIUM SERPL-SCNC: 4.3 MMOL/L (ref 3.5–5.3)
PROT SERPL-MCNC: 6.8 G/DL (ref 6.4–8.2)
PROTHROMBIN TIME: 14 SECONDS (ref 11.8–14.2)
RBC # BLD AUTO: 4.5 MILLION/UL (ref 3.88–5.62)
SODIUM SERPL-SCNC: 136 MMOL/L (ref 136–145)
TRIGL SERPL-MCNC: 70 MG/DL
TROPONIN I SERPL-MCNC: <0.02 NG/ML
WBC # BLD AUTO: 4.2 THOUSAND/UL (ref 4.31–10.16)

## 2018-07-15 PROCEDURE — 99233 SBSQ HOSP IP/OBS HIGH 50: CPT | Performed by: INTERNAL MEDICINE

## 2018-07-15 PROCEDURE — 83880 ASSAY OF NATRIURETIC PEPTIDE: CPT | Performed by: INTERNAL MEDICINE

## 2018-07-15 PROCEDURE — 97163 PT EVAL HIGH COMPLEX 45 MIN: CPT | Performed by: PHYSICAL THERAPIST

## 2018-07-15 PROCEDURE — 93005 ELECTROCARDIOGRAM TRACING: CPT

## 2018-07-15 PROCEDURE — 85027 COMPLETE CBC AUTOMATED: CPT | Performed by: INTERNAL MEDICINE

## 2018-07-15 PROCEDURE — 85610 PROTHROMBIN TIME: CPT | Performed by: INTERNAL MEDICINE

## 2018-07-15 PROCEDURE — 80061 LIPID PANEL: CPT | Performed by: INTERNAL MEDICINE

## 2018-07-15 PROCEDURE — G8978 MOBILITY CURRENT STATUS: HCPCS | Performed by: PHYSICAL THERAPIST

## 2018-07-15 PROCEDURE — 83918 ORGANIC ACIDS TOTAL QUANT: CPT | Performed by: INTERNAL MEDICINE

## 2018-07-15 PROCEDURE — 84484 ASSAY OF TROPONIN QUANT: CPT | Performed by: INTERNAL MEDICINE

## 2018-07-15 PROCEDURE — 82948 REAGENT STRIP/BLOOD GLUCOSE: CPT

## 2018-07-15 PROCEDURE — 83735 ASSAY OF MAGNESIUM: CPT | Performed by: INTERNAL MEDICINE

## 2018-07-15 PROCEDURE — G8979 MOBILITY GOAL STATUS: HCPCS | Performed by: PHYSICAL THERAPIST

## 2018-07-15 PROCEDURE — 82746 ASSAY OF FOLIC ACID SERUM: CPT | Performed by: INTERNAL MEDICINE

## 2018-07-15 PROCEDURE — 80053 COMPREHEN METABOLIC PANEL: CPT | Performed by: INTERNAL MEDICINE

## 2018-07-15 PROCEDURE — 84100 ASSAY OF PHOSPHORUS: CPT | Performed by: INTERNAL MEDICINE

## 2018-07-15 RX ORDER — INSULIN GLARGINE 100 [IU]/ML
20 INJECTION, SOLUTION SUBCUTANEOUS EVERY 12 HOURS SCHEDULED
Status: DISCONTINUED | OUTPATIENT
Start: 2018-07-15 | End: 2018-07-16 | Stop reason: HOSPADM

## 2018-07-15 RX ORDER — SODIUM CHLORIDE 9 MG/ML
75 INJECTION, SOLUTION INTRAVENOUS CONTINUOUS
Status: DISPENSED | OUTPATIENT
Start: 2018-07-15 | End: 2018-07-16

## 2018-07-15 RX ORDER — AMOXICILLIN 250 MG
2 CAPSULE ORAL 2 TIMES DAILY
Status: DISCONTINUED | OUTPATIENT
Start: 2018-07-15 | End: 2018-07-16 | Stop reason: HOSPADM

## 2018-07-15 RX ADMIN — INSULIN GLARGINE 20 UNITS: 100 INJECTION, SOLUTION SUBCUTANEOUS at 10:28

## 2018-07-15 RX ADMIN — INSULIN LISPRO 5 UNITS: 100 INJECTION, SOLUTION INTRAVENOUS; SUBCUTANEOUS at 12:09

## 2018-07-15 RX ADMIN — ASPIRIN 81 MG: 81 TABLET, COATED ORAL at 10:28

## 2018-07-15 RX ADMIN — VITAMIN D, TAB 1000IU (100/BT) 2000 UNITS: 25 TAB at 10:28

## 2018-07-15 RX ADMIN — PRAVASTATIN SODIUM 10 MG: 20 TABLET ORAL at 21:25

## 2018-07-15 RX ADMIN — Medication 2 TABLET: at 21:25

## 2018-07-15 RX ADMIN — INSULIN LISPRO 4 UNITS: 100 INJECTION, SOLUTION INTRAVENOUS; SUBCUTANEOUS at 21:30

## 2018-07-15 RX ADMIN — HYDRALAZINE HYDROCHLORIDE 25 MG: 25 TABLET, FILM COATED ORAL at 13:37

## 2018-07-15 RX ADMIN — SODIUM CHLORIDE 75 ML/HR: 0.9 INJECTION, SOLUTION INTRAVENOUS at 10:25

## 2018-07-15 RX ADMIN — INSULIN GLARGINE 20 UNITS: 100 INJECTION, SOLUTION SUBCUTANEOUS at 21:30

## 2018-07-15 RX ADMIN — Medication 2 TABLET: at 13:37

## 2018-07-15 RX ADMIN — INSULIN LISPRO 3 UNITS: 100 INJECTION, SOLUTION INTRAVENOUS; SUBCUTANEOUS at 08:16

## 2018-07-15 RX ADMIN — GABAPENTIN 100 MG: 100 CAPSULE ORAL at 21:25

## 2018-07-15 RX ADMIN — DOCUSATE SODIUM 100 MG: 100 CAPSULE, LIQUID FILLED ORAL at 10:29

## 2018-07-15 RX ADMIN — DONEPEZIL HYDROCHLORIDE 5 MG: 5 TABLET, FILM COATED ORAL at 21:25

## 2018-07-15 RX ADMIN — HYDRALAZINE HYDROCHLORIDE 25 MG: 25 TABLET, FILM COATED ORAL at 21:25

## 2018-07-15 RX ADMIN — INSULIN LISPRO 6 UNITS: 100 INJECTION, SOLUTION INTRAVENOUS; SUBCUTANEOUS at 08:16

## 2018-07-15 RX ADMIN — ENOXAPARIN SODIUM 40 MG: 40 INJECTION, SOLUTION INTRAVENOUS; SUBCUTANEOUS at 10:27

## 2018-07-15 RX ADMIN — INSULIN LISPRO 4 UNITS: 100 INJECTION, SOLUTION INTRAVENOUS; SUBCUTANEOUS at 16:41

## 2018-07-15 RX ADMIN — SODIUM CHLORIDE AND POTASSIUM CHLORIDE 100 ML/HR: .9; .15 SOLUTION INTRAVENOUS at 04:10

## 2018-07-15 RX ADMIN — INSULIN LISPRO 6 UNITS: 100 INJECTION, SOLUTION INTRAVENOUS; SUBCUTANEOUS at 12:09

## 2018-07-15 NOTE — PROGRESS NOTES
Pt's IV was put in by EMS and is due to be taken out per protocol  Pt refusing new IV to be placed since the current one is still working  Dr Nelida Real made aware

## 2018-07-15 NOTE — PROGRESS NOTES
[facility] Internal Medicine Progress Note  Patient: Norman More 81 y o  male   MRN: [id number]  PCP: Reece Houston DO  Unit/Bed#: 724-74 Encounter: [id number]  Date Of Visit: 07/15/18    Assessment:    Principal Problem:    Hyperosmolar syndrome  Active Problems:    Essential hypertension    Ambulatory dysfunction    Dementia    Dehydration with hyponatremia    CKD (chronic kidney disease) stage 3, GFR 30-59 ml/min    Uncontrolled type 2 diabetes mellitus with hyperglycemia, with long-term current use of insulin (HCC)    VIRA (acute kidney injury) ([facility] )    Dizziness    Thrombocytopenia ([facility] )      Plan:    · Improved accuchecks but still elevated, patient still clinically dehydrated  · VIRA & hyponatremia resolved  · Cont gentle hydration with saline overnight  · Optimize basal insulin & premeal humalog, a1c pending  · Stop ASA & lovenox if thrombocytopenia worse  · Transfer to SNF for rehab per PT recommendation in 2 days, if stable      VTE Pharmacologic Prophylaxis:   Pharmacologic: Enoxaparin (Lovenox)  Mechanical VTE Prophylaxis in Place: Yes    Patient Centered Rounds: I have performed bedside rounds with nursing staff today  Current Length of Stay: 1 day(s)    Current Patient Status: Inpatient     Code Status: Level 1 - Full Code      Subjective:   Patient seen & examined this AM, sitting up in chair at bedside  Feels well & wishes to go home, refusing SNF placement for rehab, appears compos mentis    Objective:     Vitals:   Temp (24hrs), Av 7 °F (36 5 °C), Min:97 2 °F (36 2 °C), Max:98 1 °F (36 7 °C)    HR:  [59-78] 59  Resp:  [15-21] 18  BP: (114-185)/(63-88) 129/67  SpO2:  [95 %-98 %] 98 %  Body mass index is 27 77 kg/m²  Input and Output Summary (last 24 hours):        Intake/Output Summary (Last 24 hours) at 07/15/18 1257  Last data filed at 07/15/18 1246   Gross per 24 hour   Intake             2920 ml   Output              740 ml   Net             2180 ml       Physical Exam:   General: in no overt distress  HEENT: oral mucosa dry, not pale, not jaundiced  Chest: clear lungs, no wheeze  Heart: S1 S2 audible, regular  Abd: soft, nontender, bowel sounds present  Psych: oriented to time, place & person  Neuro: Awake, alert, quite hard of hearing & reads lips to comprehend, moves all extremities, essentially nonfocal      Additional Data:     Labs:      Results from last 7 days  Lab Units 07/15/18  0617 07/14/18  1323   WBC Thousand/uL 4 20* 4 23*   HEMOGLOBIN g/dL 11 8* 12 4   HEMATOCRIT % 35 4* 37 2   PLATELETS Thousands/uL 75* 83*   NEUTROS PCT %  --  69   LYMPHS PCT %  --  19   MONOS PCT %  --  10   EOS PCT %  --  2       Results from last 7 days  Lab Units 07/15/18  0617   SODIUM mmol/L 136   POTASSIUM mmol/L 4 3   CHLORIDE mmol/L 105   CO2 mmol/L 22   BUN mg/dL 17   CREATININE mg/dL 1 05   CALCIUM mg/dL 8 4   TOTAL PROTEIN g/dL 6 8   BILIRUBIN TOTAL mg/dL 0 60   ALK PHOS U/L 67   ALT U/L 26   AST U/L 28   GLUCOSE RANDOM mg/dL 229*       Results from last 7 days  Lab Units 07/15/18  0617   INR  1 13     Invalid input(s): TROIP    * I Have Reviewed All Lab Data Listed Above  * Additional Pertinent Lab Tests Reviewed:  [facility] Admission Reviewed    Imaging:    Imaging Reports Reviewed Today Include: CXR      Recent Cultures (last 7 days):           Last 24 Hours Medication List:     Current Facility-Administered Medications:  acetaminophen 650 mg Oral Q6H PRN Cinthia Nathan MD    aspirin 81 mg Oral Daily Cinthia Nathan MD    cholecalciferol 2,000 Units Oral Daily Cinthia Nathan MD    donepezil 5 mg Oral HS Cinthia Nathan MD    enoxaparin 40 mg Subcutaneous Daily Cinthia Nathan MD    gabapentin 100 mg Oral HS Cinthia Nathan MD    hydrALAZINE 25 mg Oral [facility] Cinthia Nathan MD    insulin glargine 20 Units Subcutaneous Q12H [address] Cinthia Nathan MD    insulin lispro 1-6 Units Subcutaneous TID AC Cinthia Nathan MD    insulin lispro 1-6 Units Subcutaneous HS Cinthia Nathan MD    insulin lispro 10 Units Subcutaneous TID With Meals Cinthia Nathan MD    morphine injection 2 mg Intravenous Q3H PRN Cinthia Nathan MD    nitroglycerin 0 4 mg Sublingual Q5 Min PRN Cinthia Nathan MD    ondansetron 4 mg Intravenous Q6H PRN Cinthia Nathan MD    pravastatin 10 mg Oral HS Cinthia Nathan MD    senna-docusate sodium 2 tablet Oral BID Cinthia Nathan MD    sodium chloride 100 mL/hr Intravenous Continuous Cinthia Nathan MD Last Rate: 75 mL/hr (07/15/18 1025)        Today, Patient Was Seen By: Cinthia aNthan MD    ** Please Note: Dragon 360 Dictation voice to text software may have been used in the creation of this document   **

## 2018-07-15 NOTE — PHYSICAL THERAPY NOTE
PHYSICAL THERAPY NOTE          Patient Name: Moe Gallardo  PTYAG'E Date: 7/15/2018     07/15/18 1777   Note Type   Note type Eval only   Pain Assessment   Pain Assessment 0-10   Pain Score No Pain   Home Living   Type of Home (Patient unable to report living arrangement)   [address] [facility],[address]  ([place] )   Prior Function   Level of [place] Other (Comment)  (Unable to report)   Lives With Other (Comment)  (Patient unable to recall)   Receives Help From Other (Comment)   IADLs Needs assistance   Vocational Retired   Restrictions/Precautions   [place] Bearing Precautions Per Order No   Other Precautions Cognitive; Chair Alarm;Multiple lines; Fall Risk   General   Family/Caregiver Present No   Cognition   Overall Cognitive Status Impaired   Arousal/Participation Responsive   Orientation Level Oriented to situation   Memory Decreased long term memory;Decreased recall of biographical information;Decreased recall of recent events   Following Commands Follows one step commands inconsistently   RLE Assessment   RLE Assessment X   Strength RLE   RLE Overall Strength 4/5   LLE Assessment   LLE Assessment X   Strength LLE   LLE Overall Strength 4/5   Bed Mobility   Supine to Sit 5  Supervision   Transfers   Sit to Stand 5  Supervision   Additional items Increased time required; Impulsive; Bedrails;Assist x 1   Ambulation/Elevation   Gait pattern Short stride; Step to   Gait Assistance 5  Supervision   Additional items Assist x 1   Assistive Device 4-wheeled walker   Distance 10' in room   Stair Management Assistance Not tested   Balance   Static Sitting Fair +   Static Standing Fair   Ambulatory Fair   Endurance Deficit   Endurance Deficit Yes   Activity Tolerance   Activity Tolerance Patient limited by fatigue   Nurse Made Aware Nurse notified patient OOB with chair alarm, also that a toe nail was found in bed linens   Assessment   Prognosis Good   Problem List Decreased strength;Decreased endurance; Impaired balance;Decreased mobility; Decreased cognition; Impaired judgement;Decreased safety awareness   Assessment Isidra Castro is an 81year old man presenting to PT with confusion and is a poor historian  Unable to obtain home set up, level of assist and he is unsure of where he is  He follows instructions inconsistently which impairs safe mobility at this time  He is able to walk short distances in the room with close supervision  He will benefit from continued PT to address stregth and mobility deficits  Recommend DC to short term rehab, pending detailed history of home set up and level of assistance  Barriers to Discharge Other (Comment)  (unable to obtain home status)   Goals   Patient Goals To go home   STG Expiration Date 07/29/18   Short Term Goal #1 Increase BLE strength 1/2 grade; demonstrate independence with all bed mobility and transfers; ambulate > 300 ft [place] with rollator   Plan   Treatment/Interventions Functional transfer training;LE strengthening/ROM; Elevations; Therapeutic exercise; Endurance training;Cognitive reorientation; Bed mobility;Gait training;Spoke to nursing;Spoke to case management   PT Frequency (3-5x/wk)   Recommendation   Recommendation Short-term skilled PT   PT - OK to Discharge (To next level of care)

## 2018-07-15 NOTE — PLAN OF CARE
Problem: Potential for Falls  Goal: Patient will remain free of falls  INTERVENTIONS:  - Assess patient frequently for physical needs  -  Identify cognitive and physical deficits and behaviors that affect risk of falls  -  [place] fall precautions as indicated by assessment   High fall risk    - Educate patient/family on patient safety including physical limitations  - Instruct patient to call for assistance with activity based on assessment  - Modify environment to reduce risk of injury  - Consider OT/PT consult to assist with strengthening/mobility    Outcome: Progressing      Problem: PAIN - ADULT  Goal: Verbalizes/displays adequate comfort level or baseline comfort level  Interventions:  - Encourage patient to monitor pain and request assistance  - Assess pain using appropriate pain scale  - Administer analgesics based on type and severity of pain and evaluate response  - Implement non-pharmacological measures as appropriate and evaluate response  - Consider cultural and social influences on pain and pain management  - Notify physician/advanced practitioner if interventions unsuccessful or patient reports new pain   Outcome: Progressing      Problem: INFECTION - ADULT  Goal: Absence or prevention of progression during hospitalization  INTERVENTIONS:  - Assess and monitor for signs and symptoms of infection  - Monitor lab/diagnostic results  - Monitor all insertion sites, i e  indwelling lines, tubes, and drains  - [place] appropriate cooling/warming therapies per order  - Administer medications as ordered  - Instruct and encourage patient and family to use good hand hygiene technique   Outcome: Progressing      Problem: SAFETY ADULT  Goal: Maintain or return to baseline ADL function  INTERVENTIONS:  -  Assess patient's ability to carry out ADLs; assess patient's baseline for ADL function and identify physical deficits which impact ability to perform ADLs (bathing, care of mouth/teeth, toileting, grooming, dressing, etc )  - Assess/evaluate cause of self-care deficits   - Assess range of motion  - Assess patient's mobility; develop plan if impaired  - Assess patient's need for assistive devices and provide as appropriate  - Encourage maximum independence but intervene and supervise when necessary  ¯ Involve family in performance of ADLs  ¯ Assess for home care needs following discharge   ¯ Request OT consult to assist with ADL evaluation and planning for discharge  ¯ Provide patient education as appropriate   Outcome: Progressing    Goal: Maintain or return mobility status to optimal level  INTERVENTIONS:  - Assess patient's baseline mobility status (ambulation, transfers, stairs, etc )    - Identify cognitive and physical deficits and behaviors that affect mobility  - Identify mobility aids required to assist with transfers and/or ambulation (gait belt, sit-to-stand, lift, walker, cane, etc )  - [place] fall precautions as indicated by assessment  - Record patient progress and toleration of activity level on Mobility SBAR; progress patient to next Phase/Stage  - Instruct patient to call for assistance with activity based on assessment  - Request Rehabilitation consult to assist with strengthening/weightbearing, etc    Outcome: Progressing      Problem: DISCHARGE PLANNING  Goal: Discharge to home or other facility with appropriate resources  INTERVENTIONS:  - Identify barriers to discharge w/patient and caregiver  - Arrange for needed discharge resources and transportation as appropriate  - Identify discharge learning needs (meds, wound care, etc )  - Arrange for interpretive services to assist at discharge as needed  - Refer to Case Management Department for coordinating discharge planning if the patient needs post-hospital services based on physician/advanced practitioner order or complex needs related to functional status, cognitive ability, or social support system   Outcome: Progressing      Problem: Knowledge Deficit  Goal: Patient/family/caregiver demonstrates understanding of disease process, treatment plan, medications, and discharge instructions  Complete learning assessment and assess knowledge base    Interventions:  - Provide teaching at level of understanding  - Provide teaching via preferred learning methods   Outcome: Progressing      Problem: Prexisting or High Potential for Compromised Skin Integrity  Goal: Skin integrity is maintained or improved  INTERVENTIONS:  - Identify patients at risk for skin breakdown  - Assess and monitor skin integrity  - Assess and monitor nutrition and hydration status  - Monitor labs (i e  albumin)  - Assess for incontinence   - Turn and reposition patient  - Assist with mobility/ambulation  - Relieve pressure over bony prominences  - Avoid friction and shearing  - Provide appropriate hygiene as needed including keeping skin clean and dry  - Evaluate need for skin moisturizer/barrier cream  - Collaborate with interdisciplinary team (i e  Nutrition, Rehabilitation, etc )   - Patient/family teaching   Outcome: Progressing

## 2018-07-16 ENCOUNTER — APPOINTMENT (INPATIENT)
Dept: NON INVASIVE DIAGNOSTICS | Facility: HOSPITAL | Age: 83
DRG: 682 | End: 2018-07-16
Payer: MEDICARE

## 2018-07-16 VITALS
DIASTOLIC BLOOD PRESSURE: 80 MMHG | HEART RATE: 78 BPM | SYSTOLIC BLOOD PRESSURE: 139 MMHG | BODY MASS INDEX: 27.76 KG/M2 | OXYGEN SATURATION: 96 % | WEIGHT: 187.39 LBS | HEIGHT: 69 IN | RESPIRATION RATE: 18 BRPM | TEMPERATURE: 97.4 F

## 2018-07-16 LAB
ANION GAP SERPL CALCULATED.3IONS-SCNC: 10 MMOL/L (ref 4–13)
BUN SERPL-MCNC: 12 MG/DL (ref 5–25)
CALCIUM SERPL-MCNC: 8.3 MG/DL (ref 8.3–10.1)
CHLORIDE SERPL-SCNC: 106 MMOL/L (ref 100–108)
CO2 SERPL-SCNC: 23 MMOL/L (ref 21–32)
CREAT SERPL-MCNC: 0.99 MG/DL (ref 0.6–1.3)
ERYTHROCYTE [DISTWIDTH] IN BLOOD BY AUTOMATED COUNT: 14.6 % (ref 11.6–15.1)
GFR SERPL CREATININE-BSD FRML MDRD: 71 ML/MIN/1.73SQ M
GLUCOSE SERPL-MCNC: 156 MG/DL (ref 65–140)
GLUCOSE SERPL-MCNC: 156 MG/DL (ref 65–140)
GLUCOSE SERPL-MCNC: 284 MG/DL (ref 65–140)
HCT VFR BLD AUTO: 36.9 % (ref 36.5–49.3)
HGB BLD-MCNC: 12.2 G/DL (ref 12–17)
MCH RBC QN AUTO: 26 PG (ref 26.8–34.3)
MCHC RBC AUTO-ENTMCNC: 33.1 G/DL (ref 31.4–37.4)
MCV RBC AUTO: 79 FL (ref 82–98)
PLATELET # BLD AUTO: 80 THOUSANDS/UL (ref 149–390)
PMV BLD AUTO: 9.1 FL (ref 8.9–12.7)
POTASSIUM SERPL-SCNC: 4.1 MMOL/L (ref 3.5–5.3)
RBC # BLD AUTO: 4.69 MILLION/UL (ref 3.88–5.62)
SODIUM SERPL-SCNC: 139 MMOL/L (ref 136–145)
WBC # BLD AUTO: 3.97 THOUSAND/UL (ref 4.31–10.16)

## 2018-07-16 PROCEDURE — 99238 HOSP IP/OBS DSCHRG MGMT 30/<: CPT | Performed by: PHYSICIAN ASSISTANT

## 2018-07-16 PROCEDURE — 97530 THERAPEUTIC ACTIVITIES: CPT | Performed by: PHYSICAL THERAPIST

## 2018-07-16 PROCEDURE — 85027 COMPLETE CBC AUTOMATED: CPT | Performed by: INTERNAL MEDICINE

## 2018-07-16 PROCEDURE — 80048 BASIC METABOLIC PNL TOTAL CA: CPT | Performed by: INTERNAL MEDICINE

## 2018-07-16 PROCEDURE — 93306 TTE W/DOPPLER COMPLETE: CPT | Performed by: INTERNAL MEDICINE

## 2018-07-16 PROCEDURE — 92610 EVALUATE SWALLOWING FUNCTION: CPT | Performed by: SPEECH-LANGUAGE PATHOLOGIST

## 2018-07-16 PROCEDURE — 93306 TTE W/DOPPLER COMPLETE: CPT

## 2018-07-16 PROCEDURE — 82948 REAGENT STRIP/BLOOD GLUCOSE: CPT

## 2018-07-16 PROCEDURE — 97116 GAIT TRAINING THERAPY: CPT | Performed by: PHYSICAL THERAPIST

## 2018-07-16 RX ORDER — HYDRALAZINE HYDROCHLORIDE 25 MG/1
25 TABLET, FILM COATED ORAL 3 TIMES DAILY
Qty: 90 TABLET | Refills: 0 | Status: SHIPPED | OUTPATIENT
Start: 2018-07-16

## 2018-07-16 RX ORDER — LISINOPRIL 5 MG/1
5 TABLET ORAL DAILY
Qty: 30 TABLET | Refills: 0 | Status: SHIPPED | OUTPATIENT
Start: 2018-07-16

## 2018-07-16 RX ORDER — INSULIN GLARGINE 100 [IU]/ML
20 INJECTION, SOLUTION SUBCUTANEOUS EVERY 12 HOURS SCHEDULED
Qty: 10 ML | Refills: 0 | Status: SHIPPED | OUTPATIENT
Start: 2018-07-16

## 2018-07-16 RX ORDER — ASPIRIN 81 MG/1
81 TABLET ORAL DAILY
Qty: 30 TABLET | Refills: 0 | Status: SHIPPED | OUTPATIENT
Start: 2018-07-17

## 2018-07-16 RX ADMIN — SODIUM CHLORIDE 75 ML/HR: 0.9 INJECTION, SOLUTION INTRAVENOUS at 01:13

## 2018-07-16 RX ADMIN — HYDRALAZINE HYDROCHLORIDE 25 MG: 25 TABLET, FILM COATED ORAL at 05:41

## 2018-07-16 RX ADMIN — INSULIN LISPRO 4 UNITS: 100 INJECTION, SOLUTION INTRAVENOUS; SUBCUTANEOUS at 11:37

## 2018-07-16 RX ADMIN — INSULIN LISPRO 1 UNITS: 100 INJECTION, SOLUTION INTRAVENOUS; SUBCUTANEOUS at 07:22

## 2018-07-16 RX ADMIN — ASPIRIN 81 MG: 81 TABLET, COATED ORAL at 10:35

## 2018-07-16 RX ADMIN — INSULIN GLARGINE 20 UNITS: 100 INJECTION, SOLUTION SUBCUTANEOUS at 10:37

## 2018-07-16 RX ADMIN — ENOXAPARIN SODIUM 40 MG: 40 INJECTION, SOLUTION INTRAVENOUS; SUBCUTANEOUS at 10:36

## 2018-07-16 RX ADMIN — ACETAMINOPHEN 650 MG: 325 TABLET ORAL at 10:38

## 2018-07-16 RX ADMIN — Medication 2 TABLET: at 10:35

## 2018-07-16 RX ADMIN — VITAMIN D, TAB 1000IU (100/BT) 2000 UNITS: 25 TAB at 10:36

## 2018-07-16 NOTE — SPEECH THERAPY NOTE
Speech Language/Pathology  Speech/Language Pathology  Assessment    Patient Name: Vita Marie  UZQUW'X Date: 7/16/2018     Problem List  Patient Active Problem List   Diagnosis    Essential hypertension    Type 2 diabetes mellitus with hyperglycemia, with long-term current use of insulin ([address] )    Ambulatory dysfunction    Dementia    Dehydration with hyponatremia    CKD (chronic kidney disease) stage 3, GFR 30-59 ml/min    Age-related cognitive decline    Pancytopenia ([facility] [place] [address] )    Gait instability    Vitamin D deficiency    Abrasion of face    Uncontrolled type 2 diabetes mellitus with hyperglycemia, with long-term current use of insulin ([facility])    Hyperosmolar syndrome    VIRA (acute kidney injury) ([address] )    Dizziness    Thrombocytopenia (HCC)     Past Medical History  Past Medical History:   Diagnosis Date    Age-related cognitive decline     CKD (chronic kidney disease) stage 3, GFR 30-59 ml/min 08/16/2016    Dementia     Diabetes mellitus ([facility] [address] [facility]     Hearing loss     History of kidney stones     Hypertension     Thrombocytopenia (HCC)     Uncontrolled type 2 diabetes mellitus with hyperglycemia, with long-term current use of insulin ([facility] [address] ) 7/14/2018     Past Surgical History  Past Surgical History:   Procedure Laterality Date    APPENDECTOMY      CYSTOSCOPY Right 8/14/2016    Procedure: CYSTOSCOPY, right uretereoscopy,, stone extraction, stent ;  Surgeon: Antonieta Carmichael MD;  [facility];  Service:     NEPHRECTOMY      THYROIDECTOMY      75% removed from one side and 25% removed from other side; Pt is not sure which side had what removed; Resolved 1990s 07/16/18 1225   Swallow Information   Current Risks for Dysphagia & Aspiration Mental status change   Current Diet Regular; Thin liquid   Baseline Diet Regular; Thin liquids   Baseline Assessment   Behavior/Cognition Alert; Cooperative; Interactive   Speech/Language Status [facility]   Patient Positioning Upright in chair   Swallow Mechanism Exam   Labial Symmetry WFL   Labial Strength WFL   Labial ROM WFL   Labial Sensation WFL   Facial Symmetry WFL   Facial Strength WFL   Facial ROM WFL   Facial Sensation WFL   Lingual Symmetry WFL   Lingual Strength WFL   Lingual ROM WFL   Lingual Sensation WFL   Velum WFL   Gag WFL   Mandible WFL   Dentition Adequate   Volitional Cough Strong   Consistencies Assessed and Performance   Materials Admnistered Regular/Solid;Soft/Level 3; Thin liquid   Materials Adminstered Comment RN reports taking pills without s/s  Oral Stage WFL   Phargngeal Stage WFL   Swallow Mechanics WFL;Swallow initation; Appears prompt   Esophageal Concerns No s/s reported   Summary   Swallow Summary Patient is seen for dysphagia evaluation after admission with dizziness and mental status change  He is with his lunch tray feeding himself; and he is without oral or pharyngeal s/s or suspect at this time  Recommendations   Risk for Aspiration None   Recommendations Consider oral diet   Diet Solid Recommendation Regular consistency   Diet Liquid Recommendation Thin liquid   Recommended Form of Meds As desired; As tolerated   General Precautions Feed only when alert;Upright as possible for all oral intake   Results Reviewed with PT/Family/Caregiver;RN

## 2018-07-16 NOTE — ASSESSMENT & PLAN NOTE
Lab Results   Component Value Date    HGBA1C 14 1 (H) 07/14/2018       Recent Labs      07/15/18   1639  07/15/18   2130  07/16/18   0721  07/16/18   1135   POCGLU  282*  271*  156*  284*       Blood Sugar Average: Last 72 hrs:  (P) 233     On admission he was noted to be 36 units of Lantus and 6 units of Novolog with meal coverage  The patient's insulin was increased to 20 units BID of lantus and 10 units of Novolog for meal coverage  The patient was instructed to follow up with his PCP for continued management

## 2018-07-16 NOTE — ASSESSMENT & PLAN NOTE
-the patient was noted to have an elevated blood glucose of 596   -the patient's insulin regimen was increased and his blood glucose improved  -he was instructed to follow up with his PCP for continued management of his diabetes

## 2018-07-16 NOTE — ASSESSMENT & PLAN NOTE
The patient's lisinopril was held on admission due to his VIRA  This was restarted at 5 mg PO daily  He was also continued on PO hydralazine 25 mg TID  The patient will need to follow up with his PCP for continued management

## 2018-07-16 NOTE — PHYSICIAN ADVISOR
Current patient class: Inpatient  The patient is currently on Hospital Day: 3 at [address]      The patient was admitted to the hospital at 21  on 7/14/18 for the following diagnosis:  Dizziness [R42]  Hyperglycemia [R73 9]       There is documentation in the medical record of an expected length of stay of at least 2 midnights  The patient is therefore expected to satisfy the 2 midnight benchmark and given the 2 midnight presumption is appropriate for INPATIENT ADMISSION  Given this expectation of a satisfying stay, CMS instructs us that the patient is most often appropriate for inpatient admission under part A provided medical necessity is documented in the chart  After review of the relevant documentation, labs, vital signs and test results, the patient is appropriate for INPATIENT ADMISSION  Admission to the hospital as an inpatient is a complex decision making process which requires the practitioner to consider the patients presenting complaint, history and physical examination and all relevant testing  With this in mind, in this case, the patient was deemed appropriate for INPATIENT ADMISSION  After review of the documentation and testing available at the time of the admission I concur with this clinical determination of medical necessity  Rationale is as follows:    81 y  o  male who presents via EMS to the ED this afternoon following what appears to be a near syncopal episode   It appears he was found by [facility] staff complaining of dizziness while sitting on the toilet having a bowel movement  BS via EMS was 552 with a serum glucose of 597 upon ED arrival, /68   He received 2L NS IV bolus & 10u subcut humalog in  the ED   Pt was admitted for hyperosmolar state - hyperglycemia, hyponatremia and VIRA    Hyponatremia and VIRA resolved with IVF, and while BS improved they remained in high 200s to low 300s all yesterday despite basal insulin and primal humalog and pt remained clinically appearing dehydrated   IVF continued all day yesterday until it was time to replace per protocol and pt refused new IV   This am    PT have recommended short term rehab as pt is deconditioned and follows instructions inconsistently which impairs safe mobility   Plan is to discharge to SNF for rehab tomorrow if ready  Loulou Loera the findings above, the need for further hospitalization along with the documentation of medical necessity present in this chart, I feel this patient is appropriate for > 2 midnight inpatient admission  The patients vitals on arrival were ED Triage Vitals [07/14/18 1259]   Temperature Pulse Respirations Blood Pressure SpO2   97 8 °F (36 6 °C) 75 20 114/68 97 %      Temp Source Heart Rate Source Patient Position - Orthostatic VS BP Location FiO2 (%)   Temporal Monitor Sitting Left arm --      Pain Score       No Pain           Past Medical History:   Diagnosis Date    Age-related cognitive decline     CKD (chronic kidney disease) stage 3, GFR 30-59 ml/min 08/16/2016    Dementia     Diabetes mellitus (HCC)     Hearing loss     History of kidney stones     Hypertension     Thrombocytopenia (HCC)     Uncontrolled type 2 diabetes mellitus with hyperglycemia, with long-term current use of insulin ([facility] [address] [facility] 7/14/2018     Past Surgical History:   Procedure Laterality Date    APPENDECTOMY      CYSTOSCOPY Right 8/14/2016    Procedure: CYSTOSCOPY, right uretereoscopy,, stone extraction, stent ;  Surgeon: Neda Liu MD;  [facility];  Service:     NEPHRECTOMY      THYROIDECTOMY      75% removed from one side and 25% removed from other side; Pt is not sure which side had what removed;  Resolved 1990s           Consults have been placed to:   IP CONSULT TO CASE MANAGEMENT  IP CONSULT TO CASE MANAGEMENT    Vitals:    07/15/18 2311 07/16/18 0541 07/16/18 0547 07/16/18 0751   BP: 147/69 142/70  139/80   BP Location: Right arm Right arm  Right arm   Pulse: 76 72  78   Resp: 20   18   Temp: (!) 97 4 °F (36 3 °C)      TempSrc: Temporal      SpO2: 97%   96%   Weight:   85 kg (187 lb 6 3 oz)    Height:           Most recent labs:    Recent Labs      07/14/18   1323   07/15/18   0617  07/15/18   0618  07/16/18   0541   WBC  4 23*   --   4 20*   --   3 97*   HGB  12 4   --   11 8*   --   12 2   HCT  37 2   --   35 4*   --   36 9   PLT  83*   --   75*   --   80*   K  4 8   < >  4 3   --   4 1   NA  129*   < >  136   --   139   CALCIUM  8 8   < >  8 4   --   8 3   BUN  29*   < >  17   --   12   CREATININE  1 82*   < >  1 05   --   0 99   INR   --    --   1 13   --    --    TROPONINI  <0 02   < >   --   <0 02   --    AST  20   --   28   --    --    ALT  27   --   26   --    --    ALKPHOS  73   --   67   --    --    BILITOT  0 40   --   0 60   --    --     < > = values in this interval not displayed         Scheduled Meds:  Current Facility-Administered Medications:  acetaminophen 650 mg Oral Q6H PRN Joseph Coburn MD   aspirin 81 mg Oral Daily Joseph Coburn MD   cholecalciferol 2,000 Units Oral Daily Joseph Coburn MD   donepezil 5 mg Oral HS Joseph Coburn MD   enoxaparin 40 mg Subcutaneous Daily Joseph Coburn MD   gabapentin 100 mg Oral HS Joseph Coburn MD   hydrALAZINE 25 mg Oral [facility] Joseph Coburn MD   insulin glargine 20 Units Subcutaneous Q12H [address] Joseph Coburn MD   insulin lispro 1-6 Units Subcutaneous TID AC Joseph Coburn MD   insulin lispro 1-6 Units Subcutaneous HS Joseph Coburn MD   insulin lispro 10 Units Subcutaneous TID With Meals Joseph Coburn MD   morphine injection 2 mg Intravenous Q3H PRN Joseph Coburn MD   nitroglycerin 0 4 mg Sublingual Q5 Min PRN Joseph Coburn MD   ondansetron 4 mg Intravenous Q6H PRN Joseph Coburn MD   pravastatin 10 mg Oral HS Joseph Coburn MD   senna-docusate sodium 2 tablet Oral BID Joseph Coburn MD     Continuous Infusions:   PRN Meds:   acetaminophen    morphine injection    nitroglycerin    ondansetron    Surgical procedures (if appropriate):

## 2018-07-16 NOTE — NURSING NOTE
Pt wheeled off unit by PCA  [facility] bus taking him home  Discharge instructions given to pt  Verbal understanding of same  Pt in no acute distress

## 2018-07-16 NOTE — ASSESSMENT & PLAN NOTE
-likely due to his dehydration/ VIRA    -physical therapy evaluated the patient and recommended home PT

## 2018-07-16 NOTE — SOCIAL WORK
Cm spoke with the patient and they are for d/c to home today  Cm is taking into account that the patient has preferences while planning the d/c needs  Cm plan was discussed with the patient and the patient is agreeable to the plan the patient does understand the importance of follow up care and taking the medications as prescribed  The patient is aware of any symptoms that he should look for when d/c  The patient needs a ride home and he will go home via [facility] bus  And he is agreeable to the transport home  he will follow up with his own pcp  The patient is agreeable to the d/c plan and he is agreeable to the [facility] and he is aware that they will be coming into the home

## 2018-07-16 NOTE — CASE MANAGEMENT
Initial Clinical Review    Admission: Date/Time/Statement: 7/14/18 @ 1646     Orders Placed This Encounter   Procedures    Inpatient Admission (expected length of stay for this patient is greater than two midnights)     Standing Status:   Standing     Number of Occurrences:   1     Order Specific Question:   Admitting Physician     Answer:   Daine Gaucher [[id number]]     Order Specific Question:   Level of Care     Answer:   Med Surg [16]     Order Specific Question:   Estimated length of stay     Answer:   More than 2 Midnights     Order Specific Question:   Certification     Answer:   I certify that inpatient services are medically necessary for this patient for a duration of greater than two midnights  See H&P and MD Progress Notes for additional information about the patient's course of treatment  ED: Date/Time/Mode of Arrival:   ED Arrival Information     Expected Arrival Acuity Means of Arrival Escorted By Service Admission Type    - 7/14/2018 12:52 [facility] Emergency    Arrival Complaint    dizziness          Chief Complaint:   Chief Complaint   Patient presents with    Hyperglycemia - Symptomatic     pt was at the [facility] where staff found him sitting on the toilet complaining of dizziness  Patient was having a bowel movement when the episode began  EMS noted patient's blood sugar was 552   Dizziness       History of Illness: 81 y o  male who presents via EMS to the ED this afternoon following what appears to be a near syncopal episode  He is a poor historian & most of the history was obtained from his medical records  It appears he was found by [facility] staff complaining of dizziness while sitting on the toilet having a bowel movement  His CBG via EMS was 552 with a serum glucose of 597 upon ED arrival with a BP of 114/68  He has received 2L NS IV bolus & 10u subcut humalog at the ED      ED Vital Signs:   ED Triage Vitals [07/14/18 1259] Temperature Pulse Respirations Blood Pressure SpO2   97 8 °F (36 6 °C) 75 20 114/68 97 %      Temp Source Heart Rate Source Patient Position - Orthostatic VS BP Location FiO2 (%)   Temporal Monitor Sitting Left arm --      Pain Score       No Pain        Wt Readings from Last 1 Encounters:   07/16/18 85 kg (187 lb 6 3 oz)       Vital Signs (abnormal): T 97 4    Abnormal Labs/Diagnostic Test Results: WBC 4 23, PLT 83, Na 129, CL 95, BUN 29, Cr 1 82, Glucose 597   CXR -- No acute cardiopulmonary disease    EKG -- NSR    ED Treatment:   Medication Administration from 07/14/2018 1252 to 07/14/2018 1723       Date/Time Order Dose Route Action Action by Comments     07/14/2018 1328 sodium chloride 0 9 % bolus 1,000 mL 1,000 mL Intravenous New Bag [facility] [place]      07/14/2018 1459 sodium chloride 0 9 % bolus 1,000 mL 1,000 mL Intravenous New Bag Rylee Ellaree Marinas, RN      07/14/2018 1543 insulin lispro (HumaLOG) 100 units/mL subcutaneous injection 10 Units 10 Units Subcutaneous Given Annamaria Rivera RN           Past Medical/Surgical History:   Past Medical History:   Diagnosis Date    Age-related cognitive decline     CKD (chronic kidney disease) stage 3, GFR 30-59 ml/min 08/16/2016    Dementia     [facility]     Hearing loss     History of kidney stones     Hypertension     Thrombocytopenia ([facility] )     Uncontrolled type 2 diabetes mellitus with hyperglycemia, with long-term current use of insulin ([facility] ) 7/14/2018       Admitting Diagnosis: Dizziness [R42]  Hyperglycemia [R73 9]    Age/Sex: 81 y o  male    Assessment/Plan:   Principal Problem:    Hyperosmolar syndrome  Active Problems:    Essential hypertension    Ambulatory dysfunction    Dementia    Dehydration with hyponatremia    CKD (chronic kidney disease) stage 3, GFR 30-59 ml/min    Uncontrolled type 2 diabetes mellitus with hyperglycemia, with long-term current use of insulin ([facility])    VIRA (acute kidney injury) ([facility] )    Dizziness    Chronic thrombocytopenia     Plan for the Primary Problem(s):  · Admit to telemetry, serial troponin, TTE, cardiology consult, orthostatic vitals, stool softeners  ? Resume basal & premeal insulin, regular insulin IV bolus PRN, TSH & a1c, panculture  ? Immediate ICU transfer if unstable or clinical deterioration     Plan for Additional Problems:   · Baby ASA, monitor CBC for worsened thrombocytopenia  · Cont fluid resuscitation with saline IV, monitor renal fxn, hold ACEi, nephrology consult if worse   · Dizziness likely situational & vasovagal, inpatient stress test deferred to cardiology  · PT/OT eval, ? SNF geropsych unit placement     VTE Prophylaxis: Enoxaparin (Lovenox)  / sequential compression device   Code Status: full  POLST: There is no POLST form on file for this patient (pre-hospital)     Anticipated Length of Stay:  Patient will be admitted on an Inpatient basis with an anticipated length of stay of  > 2 midnights     Justification for Hospital Stay: IV fluids       Admission Orders:  Tele units  Fingerstick glucose checks qid w/ ssi  Cons carb diet  Orthostatic bp checks  Echo  SCD's  Monitor I&O's, daily weights  PT/OT/Speech evals      Scheduled Meds:   Current Facility-Administered Medications:  acetaminophen 650 mg Oral Q6H PRN Obey Arrington MD   aspirin 81 mg Oral Daily Obey Arrington MD   cholecalciferol 2,000 Units Oral Daily Obey Arrington MD   donepezil 5 mg Oral HS Obey Arrington MD   enoxaparin 40 mg Subcutaneous Daily Obey Arrington MD   gabapentin 100 mg Oral HS Obey Arrington MD   hydrALAZINE 25 mg Oral [facility] Obey Arrington MD   insulin glargine 20 Units Subcutaneous Q12H [facility] Obey Arrington MD   insulin lispro 1-6 Units Subcutaneous TID AC Obey Arrington MD   insulin lispro 1-6 Units Subcutaneous HS Obey Arrington MD   insulin lispro 10 Units Subcutaneous TID With Meals Obey Arrington MD   morphine injection 2 mg Intravenous Q3H PRN Obey Arrington MD nitroglycerin 0 4 mg Sublingual Q5 Min PRN Hollie Zavala MD   ondansetron 4 mg Intravenous Q6H PRN Hollie Zavala MD   pravastatin 10 mg Oral HS Hollie Bun, MD   senna-docusate sodium 2 tablet Oral BID Hollie Zavala MD     Continuous Infusions:    PRN Meds:   acetaminophen    morphine injection    nitroglycerin    ondansetron

## 2018-07-16 NOTE — DISCHARGE SUMMARY
Discharge- Karl Cook 1935, 81 y o  male MRN: [id number]    Unit/Bed#: 423-01 Encounter: [id number]    Primary Care Provider: Carole Watters DO   Date and time admitted to hospital: 7/14/2018 12:52 PM        * Hyperosmolar syndrome   Assessment & Plan    -the patient was noted to have an elevated blood glucose of 596   -the patient's insulin regimen was increased and his blood glucose improved  -he was instructed to follow up with his PCP for continued management of his diabetes  Uncontrolled type 2 diabetes mellitus with hyperglycemia, with long-term current use of insulin [facility])   Assessment & Plan    Lab Results   Component Value Date    HGBA1C 14 1 (H) 07/14/2018       Recent Labs      07/15/18   1639  07/15/18   2130  07/16/18   0721  07/16/18   1135   POCGLU  282*  271*  156*  284*       Blood Sugar Average: Last 72 hrs:  (P) 233     On admission he was noted to be 36 units of Lantus and 6 units of Novolog with meal coverage  The patient's insulin was increased to 20 units BID of lantus and 10 units of Novolog for meal coverage  The patient was instructed to follow up with his PCP for continued management  VIRA (acute kidney injury) ([address] )   Assessment & Plan    -On admission he was noted to have a creatinine of 1 86  This resolved with IV fluids  Dehydration with hyponatremia   Assessment & Plan    The patient was noted to have a sodium of 129 which resolved with IV fluids  Ambulatory dysfunction   Assessment & Plan    -likely due to his dehydration/ VIRA  -physical therapy evaluated the patient and recommended home PT  Essential hypertension   Assessment & Plan    The patient's lisinopril was held on admission due to his VIRA  This was restarted at 5 mg PO daily  He was also continued on PO hydralazine 25 mg TID  The patient will need to follow up with his PCP for continued management           Thrombocytopenia ([address] )   Assessment & Plan    -appears chronic, stable  -outpatient follow up with hematology  Discharging Physician / Practitioner: Mayur Weber PA-C  PCP: Kaylyn Figueroa DO  Admission Date:   Admission Orders     Ordered        07/14/18 1646  Inpatient Admission (expected length of stay for this patient is greater than two midnights)  Once             Discharge Date: 07/16/18    Resolved Problems  Date Reviewed: 7/16/2018    None          Consultations During Hospital Stay:  · none    Procedures Performed:     · Chest x-ray: No acute cardiopulmonary disease  Significant Findings / Test Results:     · Creatinine 1 86, sodium 129    Incidental Findings:   · none     Test Results Pending at Discharge (will require follow up):   · none     Outpatient Tests Requested:  · none    Complications:  none    Reason for Admission: elevated blood glucose, VIRA    Hospital Course:     Vita Marie is a 81 y o  male patient who originally presented to the hospital on 7/14/2018 due to near syncopal episode  He is a poor historian & most of the history was obtained from his medical records  He currently denies any chest pain, dyspnea, lightheadedness, cough, fever, nausea, vomiting, abd pain, diarrhea, dysuria, headache or neck stiffness  It appears he was found by [facility] staff complaining of dizziness while sitting on the toilet having a bowel movement  His CBG via EMS was 552 with a serum glucose of 597 upon ED arrival with a BP of 114/68  He has received 2L NS IV bolus & 10u subcut humalog at the ED  Please see above list of diagnoses and related plan for additional information       Condition at Discharge: good     Discharge Day Visit / Exam:     Subjective:    Vitals: Blood Pressure: 139/80 (07/16/18 0751)  Pulse: 78 (07/16/18 0751)  Temperature: (!) 97 4 °F (36 3 °C) (07/15/18 2311)  Temp Source: Temporal (07/15/18 2311)  Respirations: 18 (07/16/18 0751)  Height: 5' 9" (175 3 cm) (07/14/18 1728)  Weight - Scale: 85 kg (187 lb 6 3 oz) (07/16/18 0547)  SpO2: 96 % (07/16/18 0751)  Exam:   Physical Exam   Constitutional: He is oriented to person, place, and time  Vital signs are normal  He appears well-developed and well-nourished  He is active and cooperative  Cardiovascular: Normal rate, regular rhythm and normal heart sounds  Pulmonary/Chest: Effort normal and breath sounds normal  He has no wheezes  He has no rhonchi  He has no rales  Abdominal: Soft  Normal appearance and bowel sounds are normal  There is no tenderness  Neurological: He is alert and oriented to person, place, and time  No cranial nerve deficit  Skin: Skin is warm, dry and intact  Nursing note and vitals reviewed  Discussion with Family: n/a    Discharge instructions/Information to patient and family:   See after visit summary for information provided to patient and family  Provisions for Follow-Up Care:  See after visit summary for information related to follow-up care and any pertinent home health orders  Disposition:     Home    For Discharges to [facility] SNF:   · Not Applicable to this Patient - Not Applicable to this Patient    Planned Readmission: no     Discharge Statement:  I spent 25 minutes discharging the patient  This time was spent on the day of discharge  I had direct contact with the patient on the day of discharge  Greater than 50% of the total time was spent examining patient, answering all patient questions, arranging and discussing plan of care with patient as well as directly providing post-discharge instructions  Additional time then spent on discharge activities  Discharge Medications:  See after visit summary for reconciled discharge medications provided to patient and family        ** Please Note: This note has been constructed using a voice recognition system **

## 2018-07-16 NOTE — PHYSICAL THERAPY NOTE
PT Treatment     07/16/18 1021   Pain Assessment   Pain Score 3   Pain Location Head   Subjective   Subjective Pt only complains of a headache and notes his lightheadedness or dizziness is intermittent  Bed Mobility   Additional Comments pt seated at EOB when PT entered  Pt returned to sitting in chair at bedside with bell in reach and alarm on  Transfers   Sit to Stand 6  Modified independent   Additional items Bedrails  (with rollator walker)   Stand to Sit 6  Modified independent   Additional items (with rollator walker)   Stand pivot 6  Modified independent   Additional items (with rollator walker)   Toilet transfer 6  Modified independent   Additional items (pt able to perform all clothing management and self care)   Additional Comments pt performing all transfers and toileting at a modified Independent level  Pt able to perform all clothing management self care and handwashing independently without LOB  No LOB or instability during ambulation with rollator walker  Ambulation/Elevation   Gait pattern Forward Flexion  (mild forward flexed position)   Gait Assistance 6  Modified independent   Assistive Device 4-wheeled walker   Distance 450ft with rollator walker modified independent, no LOB or instability   Balance   Static Sitting Good   Dynamic Sitting Good   Static Standing Good  (with Rollator)   Dynamic Standing Fair +  (with rollator)   Ambulatory Fair +  (with rollator)   Endurance Deficit   Endurance Deficit No   Activity Tolerance   Activity Tolerance Patient tolerated treatment well   Assessment   Prognosis Good   Problem List Decreased endurance; Impaired balance   Assessment Pt performing all ambulation transfers and ADL's at modified Independent level with good safety awareness and no LOB  Pt expressed anxiousness to return home and feels he will be safe and able to manage at home  Pt returned to sitting in chair with call bell in reach   Pt will be safe to return home and would benefit from home health care services  Plan   Treatment/Interventions Functional transfer training;Gait training;Patient/family training; Endurance training;ADL retraining   Progress Progressing toward goals   Recommendation   Recommendation Home PT   PT - OK to Discharge Yes   SCD's reapplied and turned on with pt seated in chair alarm on and bell in reach

## 2018-07-17 ENCOUNTER — TRANSITIONAL CARE MANAGEMENT (OUTPATIENT)
Dept: FAMILY MEDICINE CLINIC | Facility: CLINIC | Age: 83
End: 2018-07-17

## 2018-07-17 LAB
ATRIAL RATE: 62 BPM
ATRIAL RATE: 71 BPM
ATRIAL RATE: 75 BPM
P AXIS: 22 DEGREES
P AXIS: 30 DEGREES
P AXIS: 47 DEGREES
PR INTERVAL: 190 MS
PR INTERVAL: 196 MS
PR INTERVAL: 196 MS
QRS AXIS: 30 DEGREES
QRS AXIS: 31 DEGREES
QRS AXIS: 37 DEGREES
QRSD INTERVAL: 76 MS
QRSD INTERVAL: 82 MS
QRSD INTERVAL: 82 MS
QT INTERVAL: 400 MS
QT INTERVAL: 408 MS
QT INTERVAL: 440 MS
QTC INTERVAL: 443 MS
QTC INTERVAL: 446 MS
QTC INTERVAL: 446 MS
T WAVE AXIS: 67 DEGREES
T WAVE AXIS: 71 DEGREES
T WAVE AXIS: 71 DEGREES
VENTRICULAR RATE: 62 BPM
VENTRICULAR RATE: 71 BPM
VENTRICULAR RATE: 75 BPM

## 2018-07-17 PROCEDURE — 93010 ELECTROCARDIOGRAM REPORT: CPT | Performed by: INTERNAL MEDICINE

## 2018-07-18 DIAGNOSIS — F01.50 VASCULAR DEMENTIA WITHOUT BEHAVIORAL DISTURBANCE (HCC): Primary | ICD-10-CM

## 2018-07-18 LAB — GABAPENTIN SERPLBLD-MCNC: NORMAL UG/ML (ref 4–16)

## 2018-07-18 RX ORDER — GABAPENTIN 100 MG/1
100 CAPSULE ORAL
Qty: 30 CAPSULE | Refills: 5 | Status: SHIPPED | OUTPATIENT
Start: 2018-07-18

## 2018-07-18 RX ORDER — DONEPEZIL HYDROCHLORIDE 5 MG/1
5 TABLET, FILM COATED ORAL
Qty: 30 TABLET | Refills: 5 | Status: ON HOLD | OUTPATIENT
Start: 2018-07-18 | End: 2019-05-14 | Stop reason: SDUPTHER

## 2018-07-20 LAB
BACTERIA BLD CULT: NORMAL
BACTERIA BLD CULT: NORMAL
METHYLMALONATE SERPL-SCNC: 146 NMOL/L (ref 0–378)
SL AMB DISCLAIMER: NORMAL

## 2018-07-24 DIAGNOSIS — R53.1 WEAKNESS: ICD-10-CM

## 2018-07-24 DIAGNOSIS — R26.9 GAIT DISTURBANCE: Primary | ICD-10-CM

## 2018-07-24 NOTE — TELEPHONE ENCOUNTER
Tell the visiting nurse it is okay to discontinue the Humalog and increase the Lantus to 30 units and continue to follow his blood sugar

## 2018-07-24 NOTE — TELEPHONE ENCOUNTER
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

## 2018-07-26 ENCOUNTER — TELEPHONE (OUTPATIENT)
Dept: FAMILY MEDICINE CLINIC | Facility: CLINIC | Age: 83
End: 2018-07-26

## 2018-07-26 NOTE — TELEPHONE ENCOUNTER
Has an appt with you at 11 am tomorrow and has not been taking any of his medications, he did not pick them up from the pharmacy    The  was out to see him and it did not seem to go anywhere

## 2018-07-31 ENCOUNTER — OFFICE VISIT (OUTPATIENT)
Dept: FAMILY MEDICINE CLINIC | Facility: CLINIC | Age: 83
End: 2018-07-31
Payer: MEDICARE

## 2018-07-31 VITALS
BODY MASS INDEX: 27.55 KG/M2 | WEIGHT: 186 LBS | SYSTOLIC BLOOD PRESSURE: 112 MMHG | HEIGHT: 69 IN | DIASTOLIC BLOOD PRESSURE: 60 MMHG

## 2018-07-31 DIAGNOSIS — I10 ESSENTIAL HYPERTENSION: Chronic | ICD-10-CM

## 2018-07-31 DIAGNOSIS — E11.65 TYPE 2 DIABETES MELLITUS WITH HYPERGLYCEMIA, WITH LONG-TERM CURRENT USE OF INSULIN (HCC): ICD-10-CM

## 2018-07-31 DIAGNOSIS — E78.2 MIXED HYPERLIPIDEMIA: Primary | ICD-10-CM

## 2018-07-31 DIAGNOSIS — Z79.4 TYPE 2 DIABETES MELLITUS WITH HYPERGLYCEMIA, WITH LONG-TERM CURRENT USE OF INSULIN (HCC): ICD-10-CM

## 2018-07-31 DIAGNOSIS — Z91.89 AT RISK FOR DIABETIC FOOT ULCER: Primary | ICD-10-CM

## 2018-07-31 PROCEDURE — 99214 OFFICE O/P EST MOD 30 MIN: CPT | Performed by: FAMILY MEDICINE

## 2018-07-31 RX ORDER — PRAVASTATIN SODIUM 10 MG
10 TABLET ORAL
Qty: 30 TABLET | Refills: 5 | Status: SHIPPED | OUTPATIENT
Start: 2018-07-31

## 2018-07-31 NOTE — PROGRESS NOTES
Assessment/Plan:    No problem-specific Assessment & Plan notes found for this encounter  Diagnoses and all orders for this visit:    Type 2 diabetes mellitus with hyperglycemia, with long-term current use of insulin ([facility])    Essential hypertension          Subjective:      Patient ID: Brandi Hammond is a 81 y o  male  Here for recheck diabetes        The following portions of the patient's history were reviewed and updated as appropriate:   He  has a past medical history of Age-related cognitive decline; CKD (chronic kidney disease) stage 3, GFR 30-59 ml/min (08/16/2016); Dementia; Diabetes mellitus ([facility] [address] [address] ); Hearing loss; History of kidney stones; Hypertension; Thrombocytopenia ([facility] ); and Uncontrolled type 2 diabetes mellitus with hyperglycemia, with long-term current use of insulin ([facility] ) (7/14/2018)  He   Patient Active Problem List    Diagnosis Date Noted    Uncontrolled type 2 diabetes mellitus with hyperglycemia, with long-term current use of insulin ([place] [id number] ) 07/14/2018    Hyperosmolar syndrome 07/14/2018    VIRA (acute kidney injury) ([facility] ) 07/14/2018    Dizziness 07/14/2018    Thrombocytopenia ([facility] ) 07/14/2018    Abrasion of face 11/04/2017    Vitamin D deficiency 11/03/2017    Gait instability 11/02/2017    Pancytopenia ([facility] )     Age-related cognitive decline     Dehydration with hyponatremia 06/13/2017    Type 2 diabetes mellitus with hyperglycemia, with long-term current use of insulin ([facility] ) 06/12/2017    Ambulatory dysfunction 06/12/2017    Dementia 06/12/2017    CKD (chronic kidney disease) stage 3, GFR 30-59 ml/min 08/16/2016    Essential hypertension 08/13/2016     He  has a past surgical history that includes Appendectomy; Thyroidectomy; CYSTOSCOPY (Right, 8/14/2016); and Nephrectomy  His family history includes Cataracts in his mother  He  reports that he quit smoking about 65 years ago   He has never used smokeless tobacco  He reports that he does not drink alcohol or use drugs   Current Outpatient Prescriptions   Medication Sig Dispense Refill    insulin glargine (LANTUS) 100 units/mL subcutaneous injection Inject 20 Units under the skin every 12 (twelve) hours (Patient taking differently: Inject 30 Units under the skin every 12 (twelve) hours  ) 10 mL 0    aspirin (ECOTRIN LOW STRENGTH) 81 mg EC tablet Take 1 tablet (81 mg total) by mouth daily 30 tablet 0    JAN MICROLET LANCETS lancets by Does not apply route      Cholecalciferol 2000 units TABS Take 1 tablet (2,000 Units total) by mouth daily 30 tablet 0    donepezil (ARICEPT) 5 mg tablet Take 1 tablet by mouth      donepezil (ARICEPT) 5 mg tablet Take 1 tablet (5 mg total) by mouth daily at bedtime 30 tablet 5    gabapentin (NEURONTIN) 100 mg capsule Take 1 capsule (100 mg total) by mouth daily at bedtime 30 capsule 5    hydrALAZINE (APRESOLINE) 25 mg tablet Take 1 tablet (25 mg total) by mouth 3 (three) times a day 90 tablet 0    insulin glargine (BASAGLAR KWIKPEN) 100 units/mL injection pen Inject 30 Units under the skin daily      insulin lispro (HumaLOG) 100 units/mL injection Inject 1-6 Units under the skin 3 (three) times a day before meals 10 mL 0    insulin lispro (HumaLOG) 100 units/mL injection Inject 10 Units under the skin 3 (three) times a day with meals 10 mL 0    Insulin Syringe-Needle U-100 (BD INSULIN SYRINGE ULTRAFINE) 31G X 15/64" 0 3 ML MISC by Does not apply route      lisinopril (ZESTRIL) 5 mg tablet Take 1 tablet (5 mg total) by mouth daily 30 tablet 0    lisinopril (ZESTRIL) 5 mg tablet Take 1 tablet by mouth 2 (two) times a day      Misc  Devices (RAISED TOILET SEAT) MISC OVER THE TOILET COMMODE 1 each 0    pravastatin (PRAVACHOL) 10 mg tablet Take 1 tablet (10 mg total) by mouth daily at bedtime 30 tablet 5     No current facility-administered medications for this visit        Current Outpatient Prescriptions on File Prior to Visit   Medication Sig    insulin glargine (LANTUS) 100 units/mL subcutaneous injection Inject 20 Units under the skin every 12 (twelve) hours (Patient taking differently: Inject 30 Units under the skin every 12 (twelve) hours  )    aspirin (ECOTRIN LOW STRENGTH) 81 mg EC tablet Take 1 tablet (81 mg total) by mouth daily    JAN MICROLET LANCETS lancets by Does not apply route    Cholecalciferol 2000 units TABS Take 1 tablet (2,000 Units total) by mouth daily    donepezil (ARICEPT) 5 mg tablet Take 1 tablet by mouth    donepezil (ARICEPT) 5 mg tablet Take 1 tablet (5 mg total) by mouth daily at bedtime    gabapentin (NEURONTIN) 100 mg capsule Take 1 capsule (100 mg total) by mouth daily at bedtime    hydrALAZINE (APRESOLINE) 25 mg tablet Take 1 tablet (25 mg total) by mouth 3 (three) times a day    insulin glargine (BASAGLAR KWIKPEN) 100 units/mL injection pen Inject 30 Units under the skin daily    insulin lispro (HumaLOG) 100 units/mL injection Inject 1-6 Units under the skin 3 (three) times a day before meals    insulin lispro (HumaLOG) 100 units/mL injection Inject 10 Units under the skin 3 (three) times a day with meals    Insulin Syringe-Needle U-100 (BD INSULIN SYRINGE ULTRAFINE) 31G X 15/64" 0 3 ML MISC by Does not apply route    lisinopril (ZESTRIL) 5 mg tablet Take 1 tablet (5 mg total) by mouth daily    lisinopril (ZESTRIL) 5 mg tablet Take 1 tablet by mouth 2 (two) times a day    Misc  Devices (RAISED TOILET SEAT) MISC OVER THE TOILET COMMODE    [DISCONTINUED] pravastatin (PRAVACHOL) 10 mg tablet Take 10 mg by mouth daily at bedtime  No current facility-administered medications on file prior to visit  He has No Known Allergies       Review of Systems   Constitutional: Positive for activity change  Negative for appetite change, diaphoresis, fatigue and fever  HENT: Negative  Eyes: Negative  Respiratory: Negative for apnea, cough, chest tightness, shortness of breath and wheezing      Cardiovascular: Negative for chest pain, palpitations and leg swelling  Gastrointestinal: Negative for abdominal distention, abdominal pain, anal bleeding, constipation, diarrhea, nausea and vomiting  Endocrine: Positive for polydipsia  Negative for cold intolerance, heat intolerance, polyphagia and polyuria  Genitourinary: Negative for difficulty urinating, dysuria, flank pain, hematuria and urgency  Musculoskeletal: Negative for arthralgias, back pain, gait problem, joint swelling and myalgias  Skin: Negative for color change, rash and wound  Allergic/Immunologic: Negative for environmental allergies, food allergies and immunocompromised state  Neurological: Negative for dizziness, seizures, syncope, speech difficulty, numbness and headaches  Hematological: Negative for adenopathy  Does not bruise/bleed easily  Psychiatric/Behavioral: Negative for agitation, behavioral problems, hallucinations, sleep disturbance and suicidal ideas  Objective:      /60 (BP Location: Left arm, Patient Position: Sitting, Cuff Size: Standard)   Ht 5' 9" (1 753 m)   Wt 84 4 kg (186 lb)   BMI 27 47 kg/m²          Physical Exam   Constitutional: He is oriented to person, place, and time  He appears well-developed and well-nourished  No distress  HENT:   Head: Normocephalic  Right Ear: External ear normal    Left Ear: External ear normal    Nose: Nose normal    Mouth/Throat: Oropharynx is clear and moist    Eyes: Conjunctivae and EOM are normal  Pupils are equal, round, and reactive to light  Right eye exhibits no discharge  Left eye exhibits no discharge  No scleral icterus  Neck: Normal range of motion  No tracheal deviation present  No thyromegaly present  Cardiovascular: Normal rate, regular rhythm and normal heart sounds  Exam reveals no gallop and no friction rub  No murmur heard  Pulmonary/Chest: Effort normal and breath sounds normal  No respiratory distress  He has no wheezes  Abdominal: Soft   Bowel sounds are normal  He exhibits no mass  There is no tenderness  There is no guarding  Musculoskeletal: He exhibits no edema or deformity  Lymphadenopathy:     He has no cervical adenopathy  Neurological: He is alert and oriented to person, place, and time  No cranial nerve deficit  Skin: Skin is warm and dry  No rash noted  He is not diaphoretic  No erythema  Great toenail and second toe black  Needs podiatry appointment   Psychiatric: He has a normal mood and affect   Thought content normal

## 2018-07-31 NOTE — TELEPHONE ENCOUNTER
Sugar today 324 improving but was only using Lantus once daily in the AM - It was explained to Pt to us the Lantus twice daily & use the prefilled syringes

## 2018-08-09 LAB — GLUCOSE SERPL-MCNC: >500 MG/DL (ref 65–140)

## 2018-09-12 DIAGNOSIS — Z79.4 TYPE 2 DIABETES MELLITUS WITHOUT COMPLICATION, WITH LONG-TERM CURRENT USE OF INSULIN (HCC): Primary | ICD-10-CM

## 2018-09-12 DIAGNOSIS — E11.9 TYPE 2 DIABETES MELLITUS WITHOUT COMPLICATION, WITH LONG-TERM CURRENT USE OF INSULIN (HCC): Primary | ICD-10-CM

## 2018-10-09 ENCOUNTER — TELEPHONE (OUTPATIENT)
Dept: FAMILY MEDICINE CLINIC | Facility: CLINIC | Age: 83
End: 2018-10-09

## 2018-10-09 DIAGNOSIS — F03.90 DEMENTIA WITHOUT BEHAVIORAL DISTURBANCE, UNSPECIFIED DEMENTIA TYPE (HCC): Primary | ICD-10-CM

## 2018-10-09 NOTE — TELEPHONE ENCOUNTER
Needs an order for a nurse to come in to dispense his medications    (home care discharged him)  Referral put in chart

## 2019-01-02 ENCOUNTER — OFFICE VISIT (OUTPATIENT)
Dept: FAMILY MEDICINE CLINIC | Facility: CLINIC | Age: 84
End: 2019-01-02
Payer: MEDICARE

## 2019-01-02 VITALS
DIASTOLIC BLOOD PRESSURE: 60 MMHG | SYSTOLIC BLOOD PRESSURE: 102 MMHG | BODY MASS INDEX: 26.1 KG/M2 | HEIGHT: 69 IN | WEIGHT: 176.2 LBS

## 2019-01-02 DIAGNOSIS — R42 DIZZINESS: Primary | ICD-10-CM

## 2019-01-02 PROBLEM — E11.42 DIABETIC PERIPHERAL NEUROPATHY (HCC): Status: ACTIVE | Noted: 2017-09-12

## 2019-01-02 LAB — SL AMB POCT GLUCOSE BLD: 252

## 2019-01-02 PROCEDURE — 82948 REAGENT STRIP/BLOOD GLUCOSE: CPT | Performed by: PHYSICIAN ASSISTANT

## 2019-01-02 PROCEDURE — 99213 OFFICE O/P EST LOW 20 MIN: CPT | Performed by: PHYSICIAN ASSISTANT

## 2019-01-03 NOTE — PROGRESS NOTES
Assessment/Plan:     Diagnoses and all orders for this visit:    Dizziness  -     POCT blood glucose        Blood pressure was 102/60  Advised patient that dizziness was most likely related to his blood pressure being low  Advised him to rehydrate with water  He will return in 2 weeks for a recheck or sooner if dizziness returns  POCT blood glucose was 252  No neurological deficits  Subjective:      Patient ID: Olive Waterman is a 81 y o  male  Patient is an 81year old male who was walking to our office to drop off a form, and became dizzy while walking  He has never had this problem before  He states that he stopped and rested on the steps at the [employer], and the dizziness resolved  He denies any chest pains, shortness of breath, or visual changes associated with the dizziness  He ate his usual breakfast of pancakes and eggs at [facility] and drank a diet soda  He admits that he took his medications this morning  Patient is a very poor historian  Dizziness   This is a new problem  The current episode started today  The problem has been resolved  Pertinent negatives include no abdominal pain, anorexia, arthralgias, change in bowel habit, chest pain, chills, congestion, coughing, diaphoresis, fatigue, fever, headaches, joint swelling, myalgias, nausea, neck pain, numbness, rash, sore throat, swollen glands, urinary symptoms, vertigo, visual change, vomiting or weakness  He has tried rest for the symptoms  The treatment provided significant relief  The following portions of the patient's history were reviewed and updated as appropriate:   He  has a past medical history of Age-related cognitive decline; CKD (chronic kidney disease) stage 3, GFR 30-59 ml/min ([address] ) (08/16/2016); Dementia; Diabetes mellitus ([address] ); Hearing loss; History of kidney stones; Hypertension;  Thrombocytopenia ([address] ); and Uncontrolled type 2 diabetes mellitus with hyperglycemia, with long-term current use of insulin ([address] ) (7/14/2018)  He   Patient Active Problem List    Diagnosis Date Noted    Uncontrolled type 2 diabetes mellitus with hyperglycemia, with long-term current use of insulin ([place] [id number] ) 07/14/2018    Hyperosmolar syndrome 07/14/2018    VIRA (acute kidney injury) ([place] [id number] ) 07/14/2018    Dizziness 07/14/2018    Thrombocytopenia ([place] [address] ) 07/14/2018    Abrasion of face 11/04/2017    Vitamin D deficiency 11/03/2017    Gait instability 11/02/2017    Pancytopenia ([place] [id number] )     Age-related cognitive decline     Diabetic peripheral neuropathy ([place] [address] ) 09/12/2017    Dehydration with hyponatremia 06/13/2017    Type 2 diabetes mellitus with hyperglycemia, with long-term current use of insulin ([place] [address] ) 06/12/2017    Ambulatory dysfunction 06/12/2017    Dementia 06/12/2017    Vitamin B12 deficiency 09/19/2016    CKD (chronic kidney disease) stage 3, GFR 30-59 ml/min ([place] [address] ) 08/16/2016    Benign essential hypertension 08/13/2016     He  has a past surgical history that includes Appendectomy; Thyroidectomy; CYSTOSCOPY (Right, 8/14/2016); and Nephrectomy  His family history includes Cataracts in his mother  He  reports that he quit smoking about 66 years ago  He has never used smokeless tobacco  He reports that he does not drink alcohol or use drugs    Current Outpatient Prescriptions   Medication Sig Dispense Refill    aspirin (ECOTRIN LOW STRENGTH) 81 mg EC tablet Take 1 tablet (81 mg total) by mouth daily 30 tablet 0    JAN MICROLET LANCETS lancets by Does not apply route      Cholecalciferol 2000 units TABS Take 1 tablet (2,000 Units total) by mouth daily 30 tablet 0    donepezil (ARICEPT) 5 mg tablet Take 1 tablet by mouth      donepezil (ARICEPT) 5 mg tablet Take 1 tablet (5 mg total) by mouth daily at bedtime 30 tablet 5    gabapentin (NEURONTIN) 100 mg capsule Take 1 capsule (100 mg total) by mouth daily at bedtime 30 capsule 5    Glucose Blood (JAN BREEZE 2 TEST) DISK Check 4-5 x per day 50 each 2    hydrALAZINE (APRESOLINE) 25 mg tablet Take 1 tablet (25 mg total) by mouth 3 (three) times a day 90 tablet 0    insulin glargine (BASAGLAR KWIKPEN) 100 units/mL injection pen Inject 30 Units under the skin daily      insulin glargine (LANTUS) 100 units/mL subcutaneous injection Inject 20 Units under the skin every 12 (twelve) hours (Patient taking differently: Inject 30 Units under the skin every 12 (twelve) hours  ) 10 mL 0    insulin lispro (HumaLOG) 100 units/mL injection Inject 1-6 Units under the skin 3 (three) times a day before meals 10 mL 0    insulin lispro (HumaLOG) 100 units/mL injection Inject 10 Units under the skin 3 (three) times a day with meals 10 mL 0    Insulin Syringe-Needle U-100 (BD INSULIN SYRINGE ULTRAFINE) 31G X 15/64" 0 3 ML MISC by Does not apply route      lisinopril (ZESTRIL) 5 mg tablet Take 1 tablet (5 mg total) by mouth daily 30 tablet 0    lisinopril (ZESTRIL) 5 mg tablet Take 1 tablet by mouth 2 (two) times a day      Misc  Devices (RAISED TOILET SEAT) MISC OVER THE TOILET COMMODE 1 each 0    pravastatin (PRAVACHOL) 10 mg tablet Take 1 tablet (10 mg total) by mouth daily at bedtime 30 tablet 5     No current facility-administered medications for this visit        Current Outpatient Prescriptions on File Prior to Visit   Medication Sig    aspirin (ECOTRIN LOW STRENGTH) 81 mg EC tablet Take 1 tablet (81 mg total) by mouth daily    JAN MICROLET LANCETS lancets by Does not apply route    Cholecalciferol 2000 units TABS Take 1 tablet (2,000 Units total) by mouth daily    donepezil (ARICEPT) 5 mg tablet Take 1 tablet by mouth    donepezil (ARICEPT) 5 mg tablet Take 1 tablet (5 mg total) by mouth daily at bedtime    gabapentin (NEURONTIN) 100 mg capsule Take 1 capsule (100 mg total) by mouth daily at bedtime    Glucose Blood (JAN BREEZE 2 TEST) DISK Check 4-5 x per day    hydrALAZINE (APRESOLINE) 25 mg tablet Take 1 tablet (25 mg total) by mouth 3 (three) times a day    insulin glargine (BASAGLAR KWIKPEN) 100 units/mL injection pen Inject 30 Units under the skin daily    insulin glargine (LANTUS) 100 units/mL subcutaneous injection Inject 20 Units under the skin every 12 (twelve) hours (Patient taking differently: Inject 30 Units under the skin every 12 (twelve) hours  )    insulin lispro (HumaLOG) 100 units/mL injection Inject 1-6 Units under the skin 3 (three) times a day before meals    insulin lispro (HumaLOG) 100 units/mL injection Inject 10 Units under the skin 3 (three) times a day with meals    Insulin Syringe-Needle U-100 (BD INSULIN SYRINGE ULTRAFINE) 31G X 15/64" 0 3 ML MISC by Does not apply route    lisinopril (ZESTRIL) 5 mg tablet Take 1 tablet (5 mg total) by mouth daily    lisinopril (ZESTRIL) 5 mg tablet Take 1 tablet by mouth 2 (two) times a day    Misc  Devices (RAISED TOILET SEAT) MISC OVER THE TOILET COMMODE    pravastatin (PRAVACHOL) 10 mg tablet Take 1 tablet (10 mg total) by mouth daily at bedtime     No current facility-administered medications on file prior to visit  He has No Known Allergies       Review of Systems   Constitutional: Negative for chills, diaphoresis, fatigue and fever  HENT: Negative for congestion, postnasal drip, rhinorrhea, sore throat and trouble swallowing  Eyes: Negative for pain and visual disturbance  Respiratory: Negative for cough, chest tightness and shortness of breath  Cardiovascular: Negative for chest pain and palpitations  Gastrointestinal: Negative for abdominal pain, anorexia, change in bowel habit, constipation, diarrhea, nausea and vomiting  Genitourinary: Negative for dysuria and hematuria  Musculoskeletal: Negative for arthralgias, joint swelling, myalgias and neck pain  Skin: Negative for rash  Neurological: Positive for dizziness  Negative for vertigo, weakness, numbness and headaches           Objective:    /60   Ht 5' 9" (1 753 m)   Wt 79 9 kg (176 lb 3 2 oz)   BMI 26 02 kg/m² Physical Exam   Constitutional: He is oriented to person, place, and time  He appears well-developed and well-nourished  No distress  HENT:   Head: Normocephalic and atraumatic  Right Ear: External ear normal    Left Ear: External ear normal    Nose: Nose normal    Mouth/Throat: Oropharynx is clear and moist  No oropharyngeal exudate  Eyes: Pupils are equal, round, and reactive to light  EOM are normal    Neck: Normal range of motion  Neck supple  Cardiovascular: Normal rate, regular rhythm and normal heart sounds  Pulmonary/Chest: Effort normal and breath sounds normal    Abdominal: Soft  There is no tenderness  There is no rebound and no guarding  Musculoskeletal: Normal range of motion  He exhibits no edema  Lymphadenopathy:     He has no cervical adenopathy  Neurological: He is alert and oriented to person, place, and time  No cranial nerve deficit  Coordination normal    Skin: Skin is warm and dry  He is not diaphoretic  Psychiatric: He has a normal mood and affect   His behavior is normal  His speech is tangential

## 2019-02-14 ENCOUNTER — TELEPHONE (OUTPATIENT)
Dept: FAMILY MEDICINE CLINIC | Facility: CLINIC | Age: 84
End: 2019-02-14

## 2019-02-14 NOTE — TELEPHONE ENCOUNTER
Called Pt's emergency contact with message - Pt's office temporarily disconnected
Needs a letter for the phone company stating he is a patient here and is in need of a phone with captions due to his hearing impairment    He is unable to here his phone now, so he will stop by tomorrow to pick it up
Note is at the 
Launch [employer] and print the 'Prescriptions from this Visit' Report

## 2019-05-13 ENCOUNTER — APPOINTMENT (EMERGENCY)
Dept: CT IMAGING | Facility: HOSPITAL | Age: 84
DRG: 683 | End: 2019-05-13
Payer: MEDICARE

## 2019-05-13 ENCOUNTER — HOSPITAL ENCOUNTER (INPATIENT)
Facility: HOSPITAL | Age: 84
LOS: 3 days | Discharge: NURSING, REHAB OR LONG-TERM CARE | DRG: 683 | End: 2019-05-16
Attending: EMERGENCY MEDICINE | Admitting: INTERNAL MEDICINE
Payer: MEDICARE

## 2019-05-13 DIAGNOSIS — N17.9 AKI (ACUTE KIDNEY INJURY) (HCC): Primary | ICD-10-CM

## 2019-05-13 DIAGNOSIS — Z91.19 MEDICAL NON-COMPLIANCE: ICD-10-CM

## 2019-05-13 DIAGNOSIS — R41.0 CONFUSION: ICD-10-CM

## 2019-05-13 DIAGNOSIS — R73.9 HYPERGLYCEMIA: ICD-10-CM

## 2019-05-13 LAB
ACETONE SERPL-MCNC: NEGATIVE MG/DL
ALBUMIN SERPL BCP-MCNC: 3 G/DL (ref 3.5–5)
ALP SERPL-CCNC: 89 U/L (ref 46–116)
ALT SERPL W P-5'-P-CCNC: 22 U/L (ref 12–78)
ANION GAP SERPL CALCULATED.3IONS-SCNC: 8 MMOL/L (ref 4–13)
APTT PPP: 27 SECONDS (ref 26–38)
AST SERPL W P-5'-P-CCNC: 25 U/L (ref 5–45)
BACTERIA UR QL AUTO: ABNORMAL /HPF
BASE EX.OXY STD BLDV CALC-SCNC: 35.7 % (ref 60–80)
BASE EXCESS BLDV CALC-SCNC: -2.5 MMOL/L
BASOPHILS # BLD AUTO: 0.03 THOUSANDS/ΜL (ref 0–0.1)
BASOPHILS NFR BLD AUTO: 1 % (ref 0–1)
BILIRUB SERPL-MCNC: 0.6 MG/DL (ref 0.2–1)
BILIRUB UR QL STRIP: NEGATIVE
BUN SERPL-MCNC: 19 MG/DL (ref 5–25)
CALCIUM SERPL-MCNC: 8.5 MG/DL (ref 8.3–10.1)
CHLORIDE SERPL-SCNC: 96 MMOL/L (ref 100–108)
CK SERPL-CCNC: 97 U/L (ref 39–308)
CLARITY UR: CLEAR
CO2 SERPL-SCNC: 26 MMOL/L (ref 21–32)
COLOR UR: YELLOW
CREAT SERPL-MCNC: 1.43 MG/DL (ref 0.6–1.3)
EOSINOPHIL # BLD AUTO: 0.11 THOUSAND/ΜL (ref 0–0.61)
EOSINOPHIL NFR BLD AUTO: 3 % (ref 0–6)
ERYTHROCYTE [DISTWIDTH] IN BLOOD BY AUTOMATED COUNT: 14.4 % (ref 11.6–15.1)
GFR SERPL CREATININE-BSD FRML MDRD: 45 ML/MIN/1.73SQ M
GLUCOSE SERPL-MCNC: 344 MG/DL (ref 65–140)
GLUCOSE SERPL-MCNC: 403 MG/DL (ref 65–140)
GLUCOSE UR STRIP-MCNC: ABNORMAL MG/DL
HCO3 BLDV-SCNC: 22.9 MMOL/L (ref 24–30)
HCT VFR BLD AUTO: 36.9 % (ref 36.5–49.3)
HGB BLD-MCNC: 11.7 G/DL (ref 12–17)
HGB UR QL STRIP.AUTO: ABNORMAL
IMM GRANULOCYTES # BLD AUTO: 0.01 THOUSAND/UL (ref 0–0.2)
IMM GRANULOCYTES NFR BLD AUTO: 0 % (ref 0–2)
INR PPP: 1.19 (ref 0.86–1.17)
KETONES UR STRIP-MCNC: NEGATIVE MG/DL
LACTATE SERPL-SCNC: 1.8 MMOL/L (ref 0.5–2)
LACTATE SERPL-SCNC: 2.7 MMOL/L (ref 0.5–2)
LEUKOCYTE ESTERASE UR QL STRIP: ABNORMAL
LYMPHOCYTES # BLD AUTO: 0.54 THOUSANDS/ΜL (ref 0.6–4.47)
LYMPHOCYTES NFR BLD AUTO: 14 % (ref 14–44)
MAGNESIUM SERPL-MCNC: 1.8 MG/DL (ref 1.6–2.6)
MCH RBC QN AUTO: 26.1 PG (ref 26.8–34.3)
MCHC RBC AUTO-ENTMCNC: 31.7 G/DL (ref 31.4–37.4)
MCV RBC AUTO: 82 FL (ref 82–98)
MONOCYTES # BLD AUTO: 0.38 THOUSAND/ΜL (ref 0.17–1.22)
MONOCYTES NFR BLD AUTO: 10 % (ref 4–12)
NEUTROPHILS # BLD AUTO: 2.8 THOUSANDS/ΜL (ref 1.85–7.62)
NEUTS SEG NFR BLD AUTO: 72 % (ref 43–75)
NITRITE UR QL STRIP: NEGATIVE
NON-SQ EPI CELLS URNS QL MICRO: ABNORMAL /HPF
NRBC BLD AUTO-RTO: 0 /100 WBCS
O2 CT BLDV-SCNC: 6.4 ML/DL
PCO2 BLDV: 42.2 MM HG (ref 42–50)
PH BLDV: 7.35 [PH] (ref 7.3–7.4)
PH UR STRIP.AUTO: 5.5 [PH]
PLATELET # BLD AUTO: 68 THOUSANDS/UL (ref 149–390)
PLATELET # BLD AUTO: 82 THOUSANDS/UL (ref 149–390)
PMV BLD AUTO: 9.3 FL (ref 8.9–12.7)
PMV BLD AUTO: 9.8 FL (ref 8.9–12.7)
PO2 BLDV: 22.8 MM HG (ref 35–45)
POTASSIUM SERPL-SCNC: 3.7 MMOL/L (ref 3.5–5.3)
PROT SERPL-MCNC: 8 G/DL (ref 6.4–8.2)
PROT UR STRIP-MCNC: NEGATIVE MG/DL
PROTHROMBIN TIME: 14.5 SECONDS (ref 11.8–14.2)
RBC # BLD AUTO: 4.48 MILLION/UL (ref 3.88–5.62)
RBC #/AREA URNS AUTO: ABNORMAL /HPF
SODIUM SERPL-SCNC: 130 MMOL/L (ref 136–145)
SP GR UR STRIP.AUTO: 1.02 (ref 1–1.03)
TROPONIN I SERPL-MCNC: <0.02 NG/ML
TSH SERPL DL<=0.05 MIU/L-ACNC: 2.81 UIU/ML (ref 0.36–3.74)
UROBILINOGEN UR QL STRIP.AUTO: 0.2 E.U./DL
WBC # BLD AUTO: 3.87 THOUSAND/UL (ref 4.31–10.16)
WBC #/AREA URNS AUTO: ABNORMAL /HPF

## 2019-05-13 PROCEDURE — 93005 ELECTROCARDIOGRAM TRACING: CPT

## 2019-05-13 PROCEDURE — 96360 HYDRATION IV INFUSION INIT: CPT

## 2019-05-13 PROCEDURE — 82805 BLOOD GASES W/O2 SATURATION: CPT | Performed by: EMERGENCY MEDICINE

## 2019-05-13 PROCEDURE — 80053 COMPREHEN METABOLIC PANEL: CPT | Performed by: EMERGENCY MEDICINE

## 2019-05-13 PROCEDURE — 81001 URINALYSIS AUTO W/SCOPE: CPT | Performed by: NURSE PRACTITIONER

## 2019-05-13 PROCEDURE — 99285 EMERGENCY DEPT VISIT HI MDM: CPT

## 2019-05-13 PROCEDURE — 85025 COMPLETE CBC W/AUTO DIFF WBC: CPT | Performed by: EMERGENCY MEDICINE

## 2019-05-13 PROCEDURE — 99285 EMERGENCY DEPT VISIT HI MDM: CPT | Performed by: EMERGENCY MEDICINE

## 2019-05-13 PROCEDURE — 1123F ACP DISCUSS/DSCN MKR DOCD: CPT | Performed by: EMERGENCY MEDICINE

## 2019-05-13 PROCEDURE — 70450 CT HEAD/BRAIN W/O DYE: CPT

## 2019-05-13 PROCEDURE — 96361 HYDRATE IV INFUSION ADD-ON: CPT

## 2019-05-13 PROCEDURE — 82550 ASSAY OF CK (CPK): CPT | Performed by: EMERGENCY MEDICINE

## 2019-05-13 PROCEDURE — 83605 ASSAY OF LACTIC ACID: CPT | Performed by: NURSE PRACTITIONER

## 2019-05-13 PROCEDURE — 83605 ASSAY OF LACTIC ACID: CPT | Performed by: EMERGENCY MEDICINE

## 2019-05-13 PROCEDURE — 84484 ASSAY OF TROPONIN QUANT: CPT | Performed by: EMERGENCY MEDICINE

## 2019-05-13 PROCEDURE — 82009 KETONE BODYS QUAL: CPT | Performed by: EMERGENCY MEDICINE

## 2019-05-13 PROCEDURE — 84443 ASSAY THYROID STIM HORMONE: CPT | Performed by: EMERGENCY MEDICINE

## 2019-05-13 PROCEDURE — 83735 ASSAY OF MAGNESIUM: CPT | Performed by: EMERGENCY MEDICINE

## 2019-05-13 PROCEDURE — 85730 THROMBOPLASTIN TIME PARTIAL: CPT | Performed by: EMERGENCY MEDICINE

## 2019-05-13 PROCEDURE — 99223 1ST HOSP IP/OBS HIGH 75: CPT | Performed by: NURSE PRACTITIONER

## 2019-05-13 PROCEDURE — 82948 REAGENT STRIP/BLOOD GLUCOSE: CPT

## 2019-05-13 PROCEDURE — 85610 PROTHROMBIN TIME: CPT | Performed by: EMERGENCY MEDICINE

## 2019-05-13 PROCEDURE — 85049 AUTOMATED PLATELET COUNT: CPT | Performed by: NURSE PRACTITIONER

## 2019-05-13 PROCEDURE — 36415 COLL VENOUS BLD VENIPUNCTURE: CPT | Performed by: NURSE PRACTITIONER

## 2019-05-13 PROCEDURE — 83036 HEMOGLOBIN GLYCOSYLATED A1C: CPT | Performed by: NURSE PRACTITIONER

## 2019-05-13 RX ORDER — ASPIRIN 81 MG/1
81 TABLET ORAL DAILY
Status: DISCONTINUED | OUTPATIENT
Start: 2019-05-14 | End: 2019-05-16 | Stop reason: HOSPADM

## 2019-05-13 RX ORDER — DONEPEZIL HYDROCHLORIDE 5 MG/1
5 TABLET, FILM COATED ORAL
Status: DISCONTINUED | OUTPATIENT
Start: 2019-05-13 | End: 2019-05-16 | Stop reason: HOSPADM

## 2019-05-13 RX ORDER — HYDRALAZINE HYDROCHLORIDE 25 MG/1
25 TABLET, FILM COATED ORAL 3 TIMES DAILY
Status: DISCONTINUED | OUTPATIENT
Start: 2019-05-13 | End: 2019-05-16 | Stop reason: HOSPADM

## 2019-05-13 RX ORDER — LISINOPRIL 5 MG/1
5 TABLET ORAL DAILY
Status: DISCONTINUED | OUTPATIENT
Start: 2019-05-14 | End: 2019-05-16 | Stop reason: HOSPADM

## 2019-05-13 RX ORDER — SODIUM CHLORIDE 9 MG/ML
50 INJECTION, SOLUTION INTRAVENOUS CONTINUOUS
Status: DISCONTINUED | OUTPATIENT
Start: 2019-05-13 | End: 2019-05-16 | Stop reason: HOSPADM

## 2019-05-13 RX ORDER — INSULIN GLARGINE 100 [IU]/ML
20 INJECTION, SOLUTION SUBCUTANEOUS EVERY 12 HOURS SCHEDULED
Status: DISCONTINUED | OUTPATIENT
Start: 2019-05-13 | End: 2019-05-16 | Stop reason: HOSPADM

## 2019-05-13 RX ORDER — HEPARIN SODIUM 5000 [USP'U]/ML
5000 INJECTION, SOLUTION INTRAVENOUS; SUBCUTANEOUS EVERY 8 HOURS SCHEDULED
Status: DISCONTINUED | OUTPATIENT
Start: 2019-05-13 | End: 2019-05-14

## 2019-05-13 RX ORDER — PRAVASTATIN SODIUM 20 MG
10 TABLET ORAL
Status: DISCONTINUED | OUTPATIENT
Start: 2019-05-13 | End: 2019-05-16 | Stop reason: HOSPADM

## 2019-05-13 RX ADMIN — SODIUM CHLORIDE 1000 ML: 0.9 INJECTION, SOLUTION INTRAVENOUS at 19:41

## 2019-05-13 RX ADMIN — HYDRALAZINE HYDROCHLORIDE 25 MG: 25 TABLET ORAL at 22:19

## 2019-05-13 RX ADMIN — SODIUM CHLORIDE 1000 ML: 0.9 INJECTION, SOLUTION INTRAVENOUS at 18:38

## 2019-05-13 RX ADMIN — DONEPEZIL HYDROCHLORIDE 5 MG: 5 TABLET, FILM COATED ORAL at 22:20

## 2019-05-13 RX ADMIN — PRAVASTATIN SODIUM 10 MG: 20 TABLET ORAL at 22:18

## 2019-05-13 RX ADMIN — INSULIN GLARGINE 20 UNITS: 100 INJECTION, SOLUTION SUBCUTANEOUS at 22:24

## 2019-05-13 RX ADMIN — INSULIN LISPRO 5 UNITS: 100 INJECTION, SOLUTION INTRAVENOUS; SUBCUTANEOUS at 22:25

## 2019-05-14 LAB
ANION GAP SERPL CALCULATED.3IONS-SCNC: 5 MMOL/L (ref 4–13)
ATRIAL RATE: 75 BPM
BASOPHILS # BLD AUTO: 0.03 THOUSANDS/ΜL (ref 0–0.1)
BASOPHILS NFR BLD AUTO: 1 % (ref 0–1)
BUN SERPL-MCNC: 14 MG/DL (ref 5–25)
CALCIUM SERPL-MCNC: 8 MG/DL (ref 8.3–10.1)
CHLORIDE SERPL-SCNC: 103 MMOL/L (ref 100–108)
CO2 SERPL-SCNC: 25 MMOL/L (ref 21–32)
CREAT SERPL-MCNC: 1.08 MG/DL (ref 0.6–1.3)
EOSINOPHIL # BLD AUTO: 0.09 THOUSAND/ΜL (ref 0–0.61)
EOSINOPHIL NFR BLD AUTO: 3 % (ref 0–6)
ERYTHROCYTE [DISTWIDTH] IN BLOOD BY AUTOMATED COUNT: 14.3 % (ref 11.6–15.1)
EST. AVERAGE GLUCOSE BLD GHB EST-MCNC: 346 MG/DL
GFR SERPL CREATININE-BSD FRML MDRD: 64 ML/MIN/1.73SQ M
GLUCOSE SERPL-MCNC: 191 MG/DL (ref 65–140)
GLUCOSE SERPL-MCNC: 224 MG/DL (ref 65–140)
GLUCOSE SERPL-MCNC: 230 MG/DL (ref 65–140)
GLUCOSE SERPL-MCNC: 245 MG/DL (ref 65–140)
GLUCOSE SERPL-MCNC: 319 MG/DL (ref 65–140)
HBA1C MFR BLD: 13.7 % (ref 4.2–6.3)
HCT VFR BLD AUTO: 34.5 % (ref 36.5–49.3)
HGB BLD-MCNC: 10.9 G/DL (ref 12–17)
IMM GRANULOCYTES # BLD AUTO: 0.01 THOUSAND/UL (ref 0–0.2)
IMM GRANULOCYTES NFR BLD AUTO: 0 % (ref 0–2)
LYMPHOCYTES # BLD AUTO: 0.49 THOUSANDS/ΜL (ref 0.6–4.47)
LYMPHOCYTES NFR BLD AUTO: 15 % (ref 14–44)
MAGNESIUM SERPL-MCNC: 1.7 MG/DL (ref 1.6–2.6)
MCH RBC QN AUTO: 25.8 PG (ref 26.8–34.3)
MCHC RBC AUTO-ENTMCNC: 31.6 G/DL (ref 31.4–37.4)
MCV RBC AUTO: 82 FL (ref 82–98)
MONOCYTES # BLD AUTO: 0.35 THOUSAND/ΜL (ref 0.17–1.22)
MONOCYTES NFR BLD AUTO: 11 % (ref 4–12)
NEUTROPHILS # BLD AUTO: 2.33 THOUSANDS/ΜL (ref 1.85–7.62)
NEUTS SEG NFR BLD AUTO: 70 % (ref 43–75)
NRBC BLD AUTO-RTO: 0 /100 WBCS
P AXIS: 54 DEGREES
PHOSPHATE SERPL-MCNC: 2.8 MG/DL (ref 2.3–4.1)
PLATELET # BLD AUTO: 68 THOUSANDS/UL (ref 149–390)
PMV BLD AUTO: 9.3 FL (ref 8.9–12.7)
POTASSIUM SERPL-SCNC: 3.9 MMOL/L (ref 3.5–5.3)
PR INTERVAL: 194 MS
QRS AXIS: 51 DEGREES
QRSD INTERVAL: 80 MS
QT INTERVAL: 402 MS
QTC INTERVAL: 448 MS
RBC # BLD AUTO: 4.22 MILLION/UL (ref 3.88–5.62)
SODIUM SERPL-SCNC: 133 MMOL/L (ref 136–145)
T WAVE AXIS: 65 DEGREES
VENTRICULAR RATE: 75 BPM
WBC # BLD AUTO: 3.3 THOUSAND/UL (ref 4.31–10.16)

## 2019-05-14 PROCEDURE — 36415 COLL VENOUS BLD VENIPUNCTURE: CPT | Performed by: NURSE PRACTITIONER

## 2019-05-14 PROCEDURE — 99231 SBSQ HOSP IP/OBS SF/LOW 25: CPT | Performed by: INTERNAL MEDICINE

## 2019-05-14 PROCEDURE — 83735 ASSAY OF MAGNESIUM: CPT | Performed by: NURSE PRACTITIONER

## 2019-05-14 PROCEDURE — 93010 ELECTROCARDIOGRAM REPORT: CPT | Performed by: INTERNAL MEDICINE

## 2019-05-14 PROCEDURE — 85025 COMPLETE CBC W/AUTO DIFF WBC: CPT | Performed by: NURSE PRACTITIONER

## 2019-05-14 PROCEDURE — 82948 REAGENT STRIP/BLOOD GLUCOSE: CPT

## 2019-05-14 PROCEDURE — 84100 ASSAY OF PHOSPHORUS: CPT | Performed by: NURSE PRACTITIONER

## 2019-05-14 PROCEDURE — 80048 BASIC METABOLIC PNL TOTAL CA: CPT | Performed by: NURSE PRACTITIONER

## 2019-05-14 RX ADMIN — HYDRALAZINE HYDROCHLORIDE 25 MG: 25 TABLET ORAL at 21:31

## 2019-05-14 RX ADMIN — INSULIN LISPRO 5 UNITS: 100 INJECTION, SOLUTION INTRAVENOUS; SUBCUTANEOUS at 12:07

## 2019-05-14 RX ADMIN — INSULIN LISPRO 2 UNITS: 100 INJECTION, SOLUTION INTRAVENOUS; SUBCUTANEOUS at 17:01

## 2019-05-14 RX ADMIN — SODIUM CHLORIDE 50 ML/HR: 0.9 INJECTION, SOLUTION INTRAVENOUS at 00:01

## 2019-05-14 RX ADMIN — INSULIN LISPRO 2 UNITS: 100 INJECTION, SOLUTION INTRAVENOUS; SUBCUTANEOUS at 21:32

## 2019-05-14 RX ADMIN — INSULIN GLARGINE 20 UNITS: 100 INJECTION, SOLUTION SUBCUTANEOUS at 21:31

## 2019-05-14 RX ADMIN — HYDRALAZINE HYDROCHLORIDE 25 MG: 25 TABLET ORAL at 09:56

## 2019-05-14 RX ADMIN — ASPIRIN 81 MG: 81 TABLET, COATED ORAL at 09:56

## 2019-05-14 RX ADMIN — ENOXAPARIN SODIUM 40 MG: 40 INJECTION SUBCUTANEOUS at 09:56

## 2019-05-14 RX ADMIN — PRAVASTATIN SODIUM 10 MG: 20 TABLET ORAL at 21:31

## 2019-05-14 RX ADMIN — INSULIN LISPRO 10 UNITS: 100 INJECTION, SOLUTION INTRAVENOUS; SUBCUTANEOUS at 12:07

## 2019-05-14 RX ADMIN — LISINOPRIL 5 MG: 5 TABLET ORAL at 09:56

## 2019-05-14 RX ADMIN — INSULIN GLARGINE 20 UNITS: 100 INJECTION, SOLUTION SUBCUTANEOUS at 09:57

## 2019-05-14 RX ADMIN — HYDRALAZINE HYDROCHLORIDE 25 MG: 25 TABLET ORAL at 17:01

## 2019-05-14 RX ADMIN — INSULIN LISPRO 3 UNITS: 100 INJECTION, SOLUTION INTRAVENOUS; SUBCUTANEOUS at 08:05

## 2019-05-14 RX ADMIN — INSULIN LISPRO 10 UNITS: 100 INJECTION, SOLUTION INTRAVENOUS; SUBCUTANEOUS at 17:01

## 2019-05-14 RX ADMIN — DONEPEZIL HYDROCHLORIDE 5 MG: 5 TABLET, FILM COATED ORAL at 21:31

## 2019-05-14 RX ADMIN — SODIUM CHLORIDE 50 ML/HR: 0.9 INJECTION, SOLUTION INTRAVENOUS at 11:32

## 2019-05-15 LAB
ALBUMIN SERPL BCP-MCNC: 2.2 G/DL (ref 3.5–5)
ALP SERPL-CCNC: 74 U/L (ref 46–116)
ALT SERPL W P-5'-P-CCNC: 22 U/L (ref 12–78)
ANION GAP SERPL CALCULATED.3IONS-SCNC: 8 MMOL/L (ref 4–13)
AST SERPL W P-5'-P-CCNC: 38 U/L (ref 5–45)
BASOPHILS # BLD AUTO: 0.02 THOUSANDS/ΜL (ref 0–0.1)
BASOPHILS NFR BLD AUTO: 1 % (ref 0–1)
BILIRUB SERPL-MCNC: 0.6 MG/DL (ref 0.2–1)
BUN SERPL-MCNC: 16 MG/DL (ref 5–25)
CALCIUM SERPL-MCNC: 7.9 MG/DL (ref 8.3–10.1)
CHLORIDE SERPL-SCNC: 103 MMOL/L (ref 100–108)
CO2 SERPL-SCNC: 24 MMOL/L (ref 21–32)
CREAT SERPL-MCNC: 1.08 MG/DL (ref 0.6–1.3)
EOSINOPHIL # BLD AUTO: 0.09 THOUSAND/ΜL (ref 0–0.61)
EOSINOPHIL NFR BLD AUTO: 2 % (ref 0–6)
ERYTHROCYTE [DISTWIDTH] IN BLOOD BY AUTOMATED COUNT: 14.2 % (ref 11.6–15.1)
GFR SERPL CREATININE-BSD FRML MDRD: 64 ML/MIN/1.73SQ M
GLUCOSE SERPL-MCNC: 115 MG/DL (ref 65–140)
GLUCOSE SERPL-MCNC: 130 MG/DL (ref 65–140)
GLUCOSE SERPL-MCNC: 140 MG/DL (ref 65–140)
GLUCOSE SERPL-MCNC: 224 MG/DL (ref 65–140)
GLUCOSE SERPL-MCNC: 255 MG/DL (ref 65–140)
HCT VFR BLD AUTO: 34.1 % (ref 36.5–49.3)
HGB BLD-MCNC: 10.9 G/DL (ref 12–17)
IMM GRANULOCYTES # BLD AUTO: 0.01 THOUSAND/UL (ref 0–0.2)
IMM GRANULOCYTES NFR BLD AUTO: 0 % (ref 0–2)
INR PPP: 1.25 (ref 0.86–1.17)
LYMPHOCYTES # BLD AUTO: 0.7 THOUSANDS/ΜL (ref 0.6–4.47)
LYMPHOCYTES NFR BLD AUTO: 17 % (ref 14–44)
MAGNESIUM SERPL-MCNC: 1.7 MG/DL (ref 1.6–2.6)
MCH RBC QN AUTO: 26.1 PG (ref 26.8–34.3)
MCHC RBC AUTO-ENTMCNC: 32 G/DL (ref 31.4–37.4)
MCV RBC AUTO: 82 FL (ref 82–98)
MONOCYTES # BLD AUTO: 0.49 THOUSAND/ΜL (ref 0.17–1.22)
MONOCYTES NFR BLD AUTO: 12 % (ref 4–12)
NEUTROPHILS # BLD AUTO: 2.9 THOUSANDS/ΜL (ref 1.85–7.62)
NEUTS SEG NFR BLD AUTO: 68 % (ref 43–75)
NRBC BLD AUTO-RTO: 0 /100 WBCS
PHOSPHATE SERPL-MCNC: 2.8 MG/DL (ref 2.3–4.1)
PLATELET # BLD AUTO: 68 THOUSANDS/UL (ref 149–390)
PMV BLD AUTO: 9.4 FL (ref 8.9–12.7)
POTASSIUM SERPL-SCNC: 3.6 MMOL/L (ref 3.5–5.3)
PROT SERPL-MCNC: 6.6 G/DL (ref 6.4–8.2)
PROTHROMBIN TIME: 15.1 SECONDS (ref 11.8–14.2)
RBC # BLD AUTO: 4.18 MILLION/UL (ref 3.88–5.62)
SODIUM SERPL-SCNC: 135 MMOL/L (ref 136–145)
WBC # BLD AUTO: 4.21 THOUSAND/UL (ref 4.31–10.16)

## 2019-05-15 PROCEDURE — G8979 MOBILITY GOAL STATUS: HCPCS | Performed by: PHYSICAL THERAPIST

## 2019-05-15 PROCEDURE — G8988 SELF CARE GOAL STATUS: HCPCS

## 2019-05-15 PROCEDURE — 97166 OT EVAL MOD COMPLEX 45 MIN: CPT

## 2019-05-15 PROCEDURE — 83735 ASSAY OF MAGNESIUM: CPT | Performed by: INTERNAL MEDICINE

## 2019-05-15 PROCEDURE — 97163 PT EVAL HIGH COMPLEX 45 MIN: CPT | Performed by: PHYSICAL THERAPIST

## 2019-05-15 PROCEDURE — 82948 REAGENT STRIP/BLOOD GLUCOSE: CPT

## 2019-05-15 PROCEDURE — 85610 PROTHROMBIN TIME: CPT | Performed by: INTERNAL MEDICINE

## 2019-05-15 PROCEDURE — 85025 COMPLETE CBC W/AUTO DIFF WBC: CPT | Performed by: INTERNAL MEDICINE

## 2019-05-15 PROCEDURE — G8978 MOBILITY CURRENT STATUS: HCPCS | Performed by: PHYSICAL THERAPIST

## 2019-05-15 PROCEDURE — 84100 ASSAY OF PHOSPHORUS: CPT | Performed by: INTERNAL MEDICINE

## 2019-05-15 PROCEDURE — G8987 SELF CARE CURRENT STATUS: HCPCS

## 2019-05-15 PROCEDURE — 99232 SBSQ HOSP IP/OBS MODERATE 35: CPT | Performed by: PHYSICIAN ASSISTANT

## 2019-05-15 PROCEDURE — 80053 COMPREHEN METABOLIC PANEL: CPT | Performed by: INTERNAL MEDICINE

## 2019-05-15 RX ADMIN — ASPIRIN 81 MG: 81 TABLET, COATED ORAL at 09:50

## 2019-05-15 RX ADMIN — INSULIN LISPRO 3 UNITS: 100 INJECTION, SOLUTION INTRAVENOUS; SUBCUTANEOUS at 16:08

## 2019-05-15 RX ADMIN — INSULIN LISPRO 10 UNITS: 100 INJECTION, SOLUTION INTRAVENOUS; SUBCUTANEOUS at 11:46

## 2019-05-15 RX ADMIN — HYDRALAZINE HYDROCHLORIDE 25 MG: 25 TABLET ORAL at 16:03

## 2019-05-15 RX ADMIN — INSULIN GLARGINE 20 UNITS: 100 INJECTION, SOLUTION SUBCUTANEOUS at 21:59

## 2019-05-15 RX ADMIN — INSULIN LISPRO 10 UNITS: 100 INJECTION, SOLUTION INTRAVENOUS; SUBCUTANEOUS at 16:08

## 2019-05-15 RX ADMIN — SODIUM CHLORIDE 50 ML/HR: 0.9 INJECTION, SOLUTION INTRAVENOUS at 07:53

## 2019-05-15 RX ADMIN — PRAVASTATIN SODIUM 10 MG: 20 TABLET ORAL at 22:03

## 2019-05-15 RX ADMIN — INSULIN GLARGINE 20 UNITS: 100 INJECTION, SOLUTION SUBCUTANEOUS at 09:57

## 2019-05-15 RX ADMIN — DONEPEZIL HYDROCHLORIDE 5 MG: 5 TABLET, FILM COATED ORAL at 21:58

## 2019-05-15 RX ADMIN — INSULIN LISPRO 10 UNITS: 100 INJECTION, SOLUTION INTRAVENOUS; SUBCUTANEOUS at 07:49

## 2019-05-15 RX ADMIN — ENOXAPARIN SODIUM 40 MG: 40 INJECTION SUBCUTANEOUS at 09:58

## 2019-05-15 RX ADMIN — INSULIN LISPRO 2 UNITS: 100 INJECTION, SOLUTION INTRAVENOUS; SUBCUTANEOUS at 11:46

## 2019-05-16 VITALS
OXYGEN SATURATION: 99 % | SYSTOLIC BLOOD PRESSURE: 132 MMHG | DIASTOLIC BLOOD PRESSURE: 60 MMHG | RESPIRATION RATE: 18 BRPM | TEMPERATURE: 97.8 F | WEIGHT: 184.08 LBS | HEART RATE: 68 BPM | BODY MASS INDEX: 27.27 KG/M2 | HEIGHT: 69 IN

## 2019-05-16 LAB
ANION GAP SERPL CALCULATED.3IONS-SCNC: 8 MMOL/L (ref 4–13)
BASOPHILS # BLD AUTO: 0.02 THOUSANDS/ΜL (ref 0–0.1)
BASOPHILS NFR BLD AUTO: 1 % (ref 0–1)
BUN SERPL-MCNC: 17 MG/DL (ref 5–25)
CALCIUM SERPL-MCNC: 8.2 MG/DL (ref 8.3–10.1)
CHLORIDE SERPL-SCNC: 104 MMOL/L (ref 100–108)
CO2 SERPL-SCNC: 23 MMOL/L (ref 21–32)
CREAT SERPL-MCNC: 1.01 MG/DL (ref 0.6–1.3)
EOSINOPHIL # BLD AUTO: 0.14 THOUSAND/ΜL (ref 0–0.61)
EOSINOPHIL NFR BLD AUTO: 3 % (ref 0–6)
ERYTHROCYTE [DISTWIDTH] IN BLOOD BY AUTOMATED COUNT: 14.6 % (ref 11.6–15.1)
GFR SERPL CREATININE-BSD FRML MDRD: 69 ML/MIN/1.73SQ M
GLUCOSE SERPL-MCNC: 109 MG/DL (ref 65–140)
GLUCOSE SERPL-MCNC: 187 MG/DL (ref 65–140)
GLUCOSE SERPL-MCNC: 242 MG/DL (ref 65–140)
GLUCOSE SERPL-MCNC: 82 MG/DL (ref 65–140)
HCT VFR BLD AUTO: 34.1 % (ref 36.5–49.3)
HGB BLD-MCNC: 10.9 G/DL (ref 12–17)
IMM GRANULOCYTES # BLD AUTO: 0.01 THOUSAND/UL (ref 0–0.2)
IMM GRANULOCYTES NFR BLD AUTO: 0 % (ref 0–2)
LYMPHOCYTES # BLD AUTO: 0.85 THOUSANDS/ΜL (ref 0.6–4.47)
LYMPHOCYTES NFR BLD AUTO: 19 % (ref 14–44)
MCH RBC QN AUTO: 26.3 PG (ref 26.8–34.3)
MCHC RBC AUTO-ENTMCNC: 32 G/DL (ref 31.4–37.4)
MCV RBC AUTO: 82 FL (ref 82–98)
MONOCYTES # BLD AUTO: 0.5 THOUSAND/ΜL (ref 0.17–1.22)
MONOCYTES NFR BLD AUTO: 11 % (ref 4–12)
NEUTROPHILS # BLD AUTO: 2.88 THOUSANDS/ΜL (ref 1.85–7.62)
NEUTS SEG NFR BLD AUTO: 66 % (ref 43–75)
NRBC BLD AUTO-RTO: 0 /100 WBCS
PLATELET # BLD AUTO: 74 THOUSANDS/UL (ref 149–390)
PMV BLD AUTO: 9.4 FL (ref 8.9–12.7)
POTASSIUM SERPL-SCNC: 3.5 MMOL/L (ref 3.5–5.3)
RBC # BLD AUTO: 4.15 MILLION/UL (ref 3.88–5.62)
SODIUM SERPL-SCNC: 135 MMOL/L (ref 136–145)
WBC # BLD AUTO: 4.4 THOUSAND/UL (ref 4.31–10.16)

## 2019-05-16 PROCEDURE — 80048 BASIC METABOLIC PNL TOTAL CA: CPT | Performed by: PHYSICIAN ASSISTANT

## 2019-05-16 PROCEDURE — 82948 REAGENT STRIP/BLOOD GLUCOSE: CPT

## 2019-05-16 PROCEDURE — 97116 GAIT TRAINING THERAPY: CPT

## 2019-05-16 PROCEDURE — 99238 HOSP IP/OBS DSCHRG MGMT 30/<: CPT | Performed by: PHYSICIAN ASSISTANT

## 2019-05-16 PROCEDURE — 85025 COMPLETE CBC W/AUTO DIFF WBC: CPT | Performed by: PHYSICIAN ASSISTANT

## 2019-05-16 RX ADMIN — SODIUM CHLORIDE 50 ML/HR: 0.9 INJECTION, SOLUTION INTRAVENOUS at 09:53

## 2019-05-16 RX ADMIN — INSULIN LISPRO 3 UNITS: 100 INJECTION, SOLUTION INTRAVENOUS; SUBCUTANEOUS at 16:47

## 2019-05-16 RX ADMIN — INSULIN LISPRO 1 UNITS: 100 INJECTION, SOLUTION INTRAVENOUS; SUBCUTANEOUS at 11:45

## 2019-05-16 RX ADMIN — ENOXAPARIN SODIUM 40 MG: 40 INJECTION SUBCUTANEOUS at 09:48

## 2019-05-16 RX ADMIN — HYDRALAZINE HYDROCHLORIDE 25 MG: 25 TABLET ORAL at 16:46

## 2019-05-16 RX ADMIN — ASPIRIN 81 MG: 81 TABLET, COATED ORAL at 09:48

## 2019-05-16 RX ADMIN — INSULIN LISPRO 10 UNITS: 100 INJECTION, SOLUTION INTRAVENOUS; SUBCUTANEOUS at 11:45

## 2019-05-16 RX ADMIN — INSULIN LISPRO 10 UNITS: 100 INJECTION, SOLUTION INTRAVENOUS; SUBCUTANEOUS at 16:47

## 2019-05-16 RX ADMIN — HYDRALAZINE HYDROCHLORIDE 25 MG: 25 TABLET ORAL at 09:48

## 2019-05-16 RX ADMIN — LISINOPRIL 5 MG: 5 TABLET ORAL at 09:48

## 2019-05-16 RX ADMIN — INSULIN LISPRO 10 UNITS: 100 INJECTION, SOLUTION INTRAVENOUS; SUBCUTANEOUS at 07:55

## 2019-05-16 RX ADMIN — INSULIN GLARGINE 20 UNITS: 100 INJECTION, SOLUTION SUBCUTANEOUS at 09:48

## 2019-05-20 ENCOUNTER — TRANSITIONAL CARE MANAGEMENT (OUTPATIENT)
Dept: FAMILY MEDICINE CLINIC | Facility: CLINIC | Age: 84
End: 2019-05-20

## 2021-04-11 NOTE — PROGRESS NOTES
HPI:  Natalie Sears is a 81 y o  male here for his Subsequent Wellness Visit  Patient here today for well visit  Patient has trouble with ambulation  Falls frequently  Was seen in the ER in February for the same  Patient has a known history of diabetic neuropathy  Denies any chest pain or shortness of breath  Patient Active Problem List   Diagnosis    Essential hypertension    Type 2 diabetes mellitus with hyperglycemia, with long-term current use of insulin ([address] )    Ambulatory dysfunction    Dementia    CKD (chronic kidney disease) stage 3, GFR 30-59 ml/min    Age-related cognitive decline    Pancytopenia ([address] )    Gait instability    Vitamin D deficiency    Abrasion of face     Past Medical History:   Diagnosis Date    Age-related cognitive decline     CKD (chronic kidney disease) stage 3, GFR 30-59 ml/min 08/16/2016    Dementia     Diabetes mellitus ([address] )     Hearing loss     History of kidney stones     Hypertension     Thrombocytopenia (HCC)      Past Surgical History:   Procedure Laterality Date    APPENDECTOMY      CYSTOSCOPY Right 8/14/2016    Procedure: CYSTOSCOPY, right uretereoscopy,, stone extraction, stent ;  Surgeon: Syl Evans MD;  Location: [facility];  Service:     NEPHRECTOMY      THYROIDECTOMY      75% removed from one side and 25% removed from other side; Pt is not sure which side had what removed;  Resolved 1990s     Family History   Problem Relation Age of Onset    Cataracts Mother      History   Smoking Status    Former Smoker    Quit date: 1953   Smokeless Tobacco    Never Used     History   Alcohol Use No     Comment: patient denies drinking alcohol      History   Drug Use No     /72   Ht 5' 9" (1 753 m)   Wt 88 5 kg (195 lb)   BMI 28 80 kg/m²       Current Outpatient Prescriptions   Medication Sig Dispense Refill    JAN MICROLET LANCETS lancets by Does not apply route      Insulin Syringe-Needle U-100 (BD INSULIN SYRINGE ULTRAFINE) 31G X Pt awake, alert and resting. Airway patent. Able to swallow w/o difficulty. No needs voiced.      Kari Bello RN  04/11/21 4271 15/64" 0 3 ML MISC by Does not apply route      cholecalciferol 2000 units TABS Take 1 tablet by mouth daily 30 tablet 0    donepezil (ARICEPT) 5 mg tablet Take 5 mg by mouth daily at bedtime      donepezil (ARICEPT) 5 mg tablet Take 1 tablet by mouth      gabapentin (NEURONTIN) 100 mg capsule Take 100 mg by mouth daily at bedtime      insulin glargine (LANTUS) 100 units/mL subcutaneous injection Inject 36 Units under the skin every morning 10 mL 0    insulin lispro (HumaLOG) 100 units/mL injection Inject 6 Units under the skin 3 (three) times a day with meals 10 mL 0    insulin lispro (HumaLOG) 100 units/mL injection Inject 1-6 Units under the skin 3 (three) times a day before meals 10 mL 0    insulin lispro (HUMALOG) 100 units/mL injection Inject 6 Units under the skin Daily      lisinopril (ZESTRIL) 5 mg tablet Take 5 mg by mouth 2 (two) times a day   pravastatin (PRAVACHOL) 10 mg tablet Take 10 mg by mouth daily at bedtime   pravastatin (PRAVACHOL) 10 mg tablet Take 1 tablet by mouth       No current facility-administered medications for this visit        No Known Allergies  Immunization History   Administered Date(s) Administered    Influenza Split High Dose Preservative Free IM 10/22/2015, 08/23/2016, 09/12/2017    Pneumococcal Conjugate 13-Valent 11/29/2016    Pneumococcal Polysaccharide PPV23 03/12/2015, 10/22/2015       Patient Care Team:  Karin Haque DO as PCP - General    Medicare Screening Tests and Risk Assessments:  AWV Clinical     ISAR:   Previous hospitalizations?:  No       Once in a Lifetime Medicare Screening:   EKG performed?:  No    AAA screening performed? (if performed, please add date to Health Maintenance):  No       Medicare Screening Tests and Risk Assessment:   AAA Risk Assessment     Tobacco use (males only):  No   Age over 72 (males only):  No Family history of AAA:  No   Osteoporosis Risk Assessment     Female:  No   :  Yes :  No   Age over 48: Yes Low body weight (<127lbs):  No   Tobacco use:  No Alcohol use:  No   Low calcium diet:  No PMHX of fractures:  No   FHX of fractures:  No    HIV Risk Assessment    None indicated:  Yes        Drug and Alcohol Use:   Tobacco use    Cigarettes:  never smoker    Tobacco use duration    Tobacco Cessation Readiness    Alcohol use    Alcohol use:  never    Alcohol Treatment Readiness   Illicit Drug Use    Drug use:  never        Diet & Exercise:   Diet   What is your diet?:  Regular   How many servings a day of the following:   Exercise    Do you currently exercise?:  currently not exercising       Cognitive Impairment Screening:   Depression screening preformed:  Yes    Depression screening results:  no significant symptoms   Cognitive Impairment Screening    Do you have difficulty learning or retaining new information?:  No Do you have difficulty handling new tasks?:  No   Do you have difficulty with reasoning?:  No Do you have difficulty with spatial ability and orientation?:  No   Do you have difficulty with language?:  No Do you have difficulty with behavior?:  No       Functional Ability/Level of Safety:   Hearing    Hearing difficulties:  Yes Bilateral:  significantly decreased   Left:  significantly decreased Right:  significantly decreased   Hearing aid:  Yes    Hearing Impairment Assessment    Hearing status:  Hard of hearing   Current Activities    Status:  unlimited ADL's, no driving, limited ADL's, limited social activities   Help needed with the folllowing:    Using the phone:  No Transportation:  No   Shopping:  No Preparing Meals:  No   Doing Housework:  No Doing Laundry:  No   Managing Medications:  Yes Managing Money:  No   ADL    Fall Risk   Have you fallen in the last 12 months?:  No Are you unsteady on your feet?:  Yes   Injury History       Home Safety:   Home Safety Risk Factors       Advanced Directives:   Advanced Directives    Living Will:  No Durable POA for healthcare:  No   Advanced directive:  No    Patient's End of Life Decisions    Reviewed with patient:  No        Urinary Incontinence:       Glaucoma:            Provider Screening     Preventative Screening/Counseling:   Cardiovascular Screening/Counseling:   (Labs Q5 years, EKG optional one-time)         Diabetes Screening/Counseling:   (2 tests/year if Pre-Diabetes or 1 test/year if no Diabetes)         Colorectal Cancer Screening/Counseling:   (FOBT Q1 yr; Flex Sig Q4 yrs or Q10 yrs after Screening Colonoscopy; Screening Colonoscpy Q2 yrs High Risk or Q10 yrs Low Risk; Barium Enema Q2 yrs High Risk or Q4 yrs Low Risk)         Prostate Cancer Screening/Counseling:   (Annual)          Breast Cancer Screening/Counseling:   (Baseline Age 28 - 43; Annual Age 36+)         Cervical Cancer Screening/Counseling:   (Annual for High Risk or Childbearing Age with Abnormal Pap in Last 3 yrs; Every 2 all others)         Osteoporosis Screening/Counseling:   (Every 2 Yrs if at risk or more if medically necessary)         AAA Screening/Counseling:   (Once per Lifetime with risk factors)    Family History of AAA:  No Age over 72 (males only):  No Tobacco use (males only):  No         Glaucoma Screening/Counseling:   (Annual)         HIV Screening/Counseling:   (Voluntary; Once annually for high risk OR 3 times for Pregnancy at diagnosis of IUP; 3rd trimester; and at Labor         Hepatitis C Screening:             Immunizations: Other Preventative Couseling (Non-Medicare Wellness Visit Required):       Referrals (Non-Medicare Wellness Visit Required):       Medical Equipment/Suppliers:           No exam data present    Physical Exam :  Physical Exam   Constitutional: He appears well-developed and well-nourished  No distress  Cardiovascular: Normal rate, regular rhythm and normal heart sounds  Pulmonary/Chest: Effort normal and breath sounds normal    Musculoskeletal: He exhibits edema (Mild ankle edema)         Reviewed Updated [facility] Prior Wellness Visits:   Last Medicare wellness visit information was reviewed, patient interviewed , no change since last AWVyes  Last Medicare wellness visit information was reviewed, patient interviewed and updates made to the record today yes    Assessment and Plan:  1  Essential hypertension  CBC and differential    Ambulatory Referral to Home Health   2  Type 2 diabetes mellitus with hyperglycemia, with long-term current use of insulin (HCC)  CBC and differential    Comprehensive metabolic panel    HEMOGLOBIN A1C W/ EAG ESTIMATION    TSH, 3rd generation with T4 reflex    Lipid Panel with Direct LDL reflex    Microalbumin / creatinine urine ratio    Ambulatory referral to Podiatry    Ambulatory Referral to [address] [place] [address] Randall Oconnell   3  Dementia without behavioral disturbance, unspecified dementia type  CBC and differential    Ambulatory Referral to Home Health   4  Gait instability  Ambulatory Referral to Home Health   5  Medicare annual wellness visit, subsequent         We will refer him to home health and Podiatry  Patient needs help with ambulation and medication management  Blood work ordered  Follow-up here in 2 months or p r n    Health Maintenance Due   Topic Date Due    SLP PLAN OF CARE  1935    OPHTHALMOLOGY EXAM  07/06/1946    DTaP,Tdap,and Td Vaccines (1 - Tdap) 07/06/1957    GLAUCOMA SCREENING 67+ YR  07/06/2003